# Patient Record
Sex: MALE | Race: WHITE | NOT HISPANIC OR LATINO | Employment: OTHER | URBAN - METROPOLITAN AREA
[De-identification: names, ages, dates, MRNs, and addresses within clinical notes are randomized per-mention and may not be internally consistent; named-entity substitution may affect disease eponyms.]

---

## 2023-12-28 ENCOUNTER — OFFICE VISIT (OUTPATIENT)
Dept: PULMONOLOGY | Facility: CLINIC | Age: 63
End: 2023-12-28
Payer: COMMERCIAL

## 2023-12-28 VITALS
WEIGHT: 189.8 LBS | HEIGHT: 72 IN | HEART RATE: 69 BPM | TEMPERATURE: 97.7 F | BODY MASS INDEX: 25.71 KG/M2 | DIASTOLIC BLOOD PRESSURE: 91 MMHG | SYSTOLIC BLOOD PRESSURE: 143 MMHG

## 2023-12-28 DIAGNOSIS — J44.9 CHRONIC OBSTRUCTIVE PULMONARY DISEASE, UNSPECIFIED COPD TYPE (MULTI): Primary | ICD-10-CM

## 2023-12-28 DIAGNOSIS — Z87.891 HISTORY OF NICOTINE DEPENDENCE: ICD-10-CM

## 2023-12-28 PROCEDURE — 1036F TOBACCO NON-USER: CPT | Performed by: STUDENT IN AN ORGANIZED HEALTH CARE EDUCATION/TRAINING PROGRAM

## 2023-12-28 PROCEDURE — 99203 OFFICE O/P NEW LOW 30 MIN: CPT | Performed by: STUDENT IN AN ORGANIZED HEALTH CARE EDUCATION/TRAINING PROGRAM

## 2023-12-28 RX ORDER — ATORVASTATIN CALCIUM 40 MG/1
40 TABLET, FILM COATED ORAL
COMMUNITY
Start: 2023-09-16

## 2023-12-28 RX ORDER — MELOXICAM 15 MG/1
15 TABLET ORAL
COMMUNITY
Start: 2023-10-09 | End: 2024-04-18 | Stop reason: HOSPADM

## 2023-12-28 RX ORDER — BUPRENORPHINE AND NALOXONE 8; 2 MG/1; MG/1
FILM, SOLUBLE BUCCAL; SUBLINGUAL
COMMUNITY
End: 2024-03-20 | Stop reason: WASHOUT

## 2023-12-28 RX ORDER — FLUOXETINE HYDROCHLORIDE 40 MG/1
CAPSULE ORAL
COMMUNITY
End: 2024-03-19 | Stop reason: SDUPTHER

## 2023-12-28 RX ORDER — PANTOPRAZOLE SODIUM 40 MG/1
40 TABLET, DELAYED RELEASE ORAL
COMMUNITY

## 2023-12-28 RX ORDER — NYSTATIN 100000 [USP'U]/ML
SUSPENSION ORAL
COMMUNITY
Start: 2023-09-13 | End: 2024-03-19 | Stop reason: WASHOUT

## 2023-12-28 RX ORDER — BENZOYL PEROXIDE 100 MG/ML
LIQUID TOPICAL
COMMUNITY
Start: 2020-07-15 | End: 2024-03-19 | Stop reason: WASHOUT

## 2023-12-28 RX ORDER — METOPROLOL SUCCINATE 25 MG/1
25 TABLET, EXTENDED RELEASE ORAL
COMMUNITY
Start: 2023-09-25

## 2023-12-28 RX ORDER — NAPROXEN SODIUM 550 MG/1
550 TABLET ORAL
COMMUNITY
End: 2024-04-18 | Stop reason: HOSPADM

## 2023-12-28 RX ORDER — HYDROXYZINE PAMOATE 25 MG/1
CAPSULE ORAL
COMMUNITY
Start: 2018-07-23 | End: 2024-03-19 | Stop reason: SDUPTHER

## 2023-12-28 RX ORDER — TIOTROPIUM BROMIDE 18 UG/1
18 CAPSULE ORAL; RESPIRATORY (INHALATION)
COMMUNITY
Start: 2019-09-30

## 2023-12-28 RX ORDER — TRIAMCINOLONE ACETONIDE 1 MG/G
CREAM TOPICAL
COMMUNITY
Start: 2023-02-16

## 2023-12-28 RX ORDER — CLINDAMYCIN PHOSPHATE 11.9 MG/ML
SOLUTION TOPICAL 2 TIMES DAILY
COMMUNITY
Start: 2023-06-09

## 2023-12-28 RX ORDER — OLANZAPINE 7.5 MG/1
TABLET ORAL
COMMUNITY

## 2023-12-28 RX ORDER — LIDOCAINE 50 MG/G
1 PATCH TOPICAL
COMMUNITY
Start: 2022-03-02

## 2023-12-28 RX ORDER — HYDROXYZINE HYDROCHLORIDE 50 MG/1
TABLET, FILM COATED ORAL
COMMUNITY
Start: 2020-11-20 | End: 2024-03-19 | Stop reason: SDUPTHER

## 2023-12-28 RX ORDER — DICLOFENAC SODIUM 10 MG/G
1 GEL TOPICAL 2 TIMES DAILY PRN
COMMUNITY
Start: 2015-06-05

## 2023-12-28 RX ORDER — OLANZAPINE 15 MG/1
15 TABLET ORAL
COMMUNITY
Start: 2019-09-30 | End: 2024-03-20 | Stop reason: WASHOUT

## 2023-12-28 RX ORDER — DILTIAZEM HYDROCHLORIDE 240 MG/1
240 CAPSULE, COATED, EXTENDED RELEASE ORAL
COMMUNITY
Start: 2023-12-06 | End: 2024-04-02

## 2023-12-28 RX ORDER — FLUTICASONE PROPIONATE 50 MCG
1 SPRAY, SUSPENSION (ML) NASAL
COMMUNITY
Start: 2022-11-15

## 2023-12-28 RX ORDER — IBUPROFEN 200 MG
TABLET ORAL EVERY 24 HOURS
COMMUNITY
Start: 2018-04-02 | End: 2024-01-15 | Stop reason: WASHOUT

## 2023-12-28 RX ORDER — ASPIRIN 81 MG/1
81 TABLET ORAL
COMMUNITY
Start: 2023-09-08

## 2023-12-28 RX ORDER — CHOLECALCIFEROL (VITAMIN D3) 25 MCG
25 TABLET ORAL
COMMUNITY
Start: 2023-10-09

## 2023-12-28 RX ORDER — FLUOXETINE HYDROCHLORIDE 20 MG/1
CAPSULE ORAL
COMMUNITY
Start: 2018-04-30 | End: 2024-03-19 | Stop reason: SDUPTHER

## 2023-12-28 RX ORDER — IPRATROPIUM BROMIDE AND ALBUTEROL SULFATE 2.5; .5 MG/3ML; MG/3ML
3 SOLUTION RESPIRATORY (INHALATION) EVERY 6 HOURS PRN
COMMUNITY
Start: 2023-08-02

## 2023-12-28 RX ORDER — BACLOFEN 10 MG/1
10 TABLET ORAL DAILY PRN
COMMUNITY
Start: 2018-02-02 | End: 2024-04-18 | Stop reason: HOSPADM

## 2023-12-28 RX ORDER — TRAZODONE HYDROCHLORIDE 100 MG/1
50 TABLET ORAL
COMMUNITY

## 2023-12-28 RX ORDER — LITHIUM CARBONATE 300 MG/1
300 CAPSULE ORAL 2 TIMES DAILY
COMMUNITY
Start: 2021-12-08

## 2023-12-28 RX ORDER — FLUOXETINE HYDROCHLORIDE 60 MG/1
1 TABLET, FILM COATED ORAL; ORAL
COMMUNITY
Start: 2018-01-11

## 2023-12-28 RX ORDER — TIOTROPIUM BROMIDE AND OLODATEROL 3.124; 2.736 UG/1; UG/1
2 SPRAY, METERED RESPIRATORY (INHALATION) DAILY
Qty: 1 G | Refills: 3 | Status: SHIPPED | OUTPATIENT
Start: 2023-12-28

## 2023-12-28 RX ORDER — TIOTROPIUM BROMIDE 18 UG/1
1 CAPSULE ORAL; RESPIRATORY (INHALATION) DAILY
COMMUNITY
Start: 2018-02-02 | End: 2024-03-19 | Stop reason: SDUPTHER

## 2023-12-28 RX ORDER — LITHIUM CARBONATE 150 MG/1
CAPSULE ORAL
COMMUNITY
Start: 2023-12-18 | End: 2024-03-20 | Stop reason: WASHOUT

## 2023-12-28 RX ORDER — CLINDAMYCIN PHOSPHATE 10 UG/ML
LOTION TOPICAL
COMMUNITY
Start: 2020-07-15 | End: 2024-03-19 | Stop reason: SDUPTHER

## 2023-12-28 RX ORDER — HYDROXYZINE HYDROCHLORIDE 25 MG/1
25 TABLET, FILM COATED ORAL 2 TIMES DAILY PRN
COMMUNITY
Start: 2023-06-23

## 2023-12-28 RX ORDER — CHOLECALCIFEROL (VITAMIN D3) 25 MCG
1 TABLET ORAL DAILY
COMMUNITY
Start: 2018-02-02 | End: 2024-03-19 | Stop reason: SDUPTHER

## 2023-12-28 RX ORDER — ASPIRIN/CALCIUM CARB/MAGNESIUM 325 MG
4 TABLET ORAL
COMMUNITY
Start: 2018-04-02 | End: 2024-01-15 | Stop reason: WASHOUT

## 2023-12-28 RX ORDER — FLUTICASONE PROPIONATE 50 MCG
1 SPRAY, SUSPENSION (ML) NASAL DAILY
COMMUNITY
Start: 2018-02-02 | End: 2024-03-19 | Stop reason: SDUPTHER

## 2023-12-28 NOTE — PATIENT INSTRUCTIONS
Thank you for visiting the Pulmonary Clinic today.   Your breathing medications: stop the spiriva--start the stiolto   Tests: will request the results of your pulmonary function test, Low dose CT chest in September 2024   Keep up the good work on quitting smoking, will also place referral to smoking cessation clinic  For resources to help quit smoking you can visit the [facility] website at [email or web address]  or call 0-250 QUIT-NOW ([phone])   Return in 6 months   If you have questions or concerns, call [phone] (option 4)

## 2023-12-28 NOTE — PROGRESS NOTES
Department of Medicine I Division of Pulmonary, Critical Care, and Sleep Medicine   [id number] [place] [address]  Phone: [phone]  Fax: [phone]    History of Present Illness   Trip Brown is a 63 y.o. male presenting with COPD/establish care with pulmonary     Referred by:  self-referral for COPD. I have independently interviewed and examined the patient in the office and reviewed available records.    Pt endorses several year history of COPD   Previously following with  pulmonary at [facility]--Dr. Rangel   Last pulmonary visit was in September in lung nodule clinic   Endorses having PFTs in the last 2 years with F   Has been having issues with thrush --has not been taking symbicort, inquiring about alternate inhalers.      MMRC 2  Exacerbations:  3 in the last year requiring steroids   No hospitalizations for the copd  No hospitalizations for pneumonias     Pulmonary Medications:  symbicort, spiriva --has been on these for 8 years  , albuterol prn maybe a couple times a day few days a week, nebulizer machine     Review of Systems  Review of Systems   Constitutional:  Negative for fatigue and fever.   HENT:  Negative for congestion.    Respiratory:  Positive for shortness of breath and wheezing. Negative for chest tightness.    Cardiovascular:  Negative for chest pain and leg swelling.   Musculoskeletal:  Positive for back pain.   Skin:  Negative for rash.   Neurological:  Negative for dizziness.     All other review of systems are negative and/or non-contributory.    Past Medical History   He has no past medical history on file.  Hepatitis C--s/p treatment   Back problems   Bipolar/ MDD   H/o heroin use   ?svt s/p ablation     Immunizations     Immunization History   Administered Date(s) Administered    Moderna SARS-CoV-2 Vaccination 01/21/2021, 02/18/2021    Pfizer Purple Cap SARS-CoV-2 09/20/2021       Medications and Allergies   No current outpatient medications    Patient has no known allergies.    Social History   He reports that he has never smoked. He has never used smokeless tobacco. No history on file for alcohol use and drug use.    Smoking History: quit smoking Dec 10, 2023 is using the nicotine lozenges.  Had smoked for 50 years.  Did quit before for a year, did try chantix before--but made him suicidal so it was stopped. Wellbutrin made him hallucinate.   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 Used to have pets.      Family History   No family history on file.  Does not know-adopted         Surgical History   He has no past surgical history on file.    Physical Exam   BP (!) 143/91 (BP Location: Right arm, Patient Position: Sitting, BP Cuff Size: Large adult)   Pulse 69   Temp 36.5 °C (97.7 °F) (Temporal)   Ht 1.829 m (6')   Wt 86.1 kg (189 lb 12.8 oz)   BMI 25.74 kg/m²      Physical Exam  Constitutional:       Appearance: Normal appearance.   HENT:      Head: Normocephalic and atraumatic.      Mouth/Throat:      Comments: No thrush  Eyes:      Pupils: Pupils are equal, round, and reactive to light.   Cardiovascular:      Rate and Rhythm: Normal rate and regular rhythm.   Pulmonary:      Effort: Pulmonary effort is normal.      Breath sounds: Normal breath sounds.   Skin:     Findings: No rash.   Neurological:      General: No focal deficit present.      Mental Status: He is alert and oriented to person, place, and time.   Psychiatric:         Mood and Affect: Mood normal.          Results   Pulmonary Function Tests:      No pfts on file     Chest Radiograph:  No results found for this or any previous visit from the past 2000 days.      Chest CT Scan:  9/2023   Other findings:  The central airways are patent without evidence of   endobronchial lesion.  Mild diffuse bronchial wall thickening is seen.    There is no acute focal lung consolidation.  Subsegmental atelectasis is   seen in the lingula.  No pleural effusion or  "pneumothorax is seen.  No   enlarged supraclavicular, axillary, mediastinal or hilar lymph nodes are   seen.  The aorta and main pulmonary artery are normal in course and   caliber.  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  The heart   size is normal.  There is no pericardial effusion.  The thyroid gland is   heterogeneous.  The esophagus is nondilated.  The soft tissues of the   chest wall are unremarkable.  Multiple cysts are seen in the partially   imaged kidneys, better characterized on CT abdomen dated 10/23/2018.    Sclerosis of the L2 vertebra is partially imaged, likely degenerative,   unchanged since 10/23/2018.     ECHO:  No results found for this or any previous visit from the past 365 days.       Labs:  No results found for: \"WBC\", \"HGB\", \"HCT\", \"MCV\", \"PLT\"    No results found for: \"CREATININE\", \"BUN\", \"NA\", \"K\", \"CL\", \"CO2\"     No results found for: \"YASMANI\", \"RF\", \"SEDRATE\"     IgE: No results found for: \"IGE\"   Respiratory Allergy Panel:  AEC: No results found for: \"EOSABS\", \"EOSPCT\"    Cultures:  No results found for: \"AFBCX\"   No results found for: \"RESPCULTCYFI\"    No results found for the last 90 days.  C     Assessment and Plan       63 year old male here to establish care with pulmonary for his COPD     COPD  -will ask office to request results of most recent PFTs (was not able to find them in care everywhere)   -will stop symbicort and spiriva and start stiolto   -continue prn albuterol       Smoking cessation   -keep up the good work staying abstinent from smoking   -smoking cessation clinic referral placed   -quit line resources provided     Lung cancer screening  -next CT scan due 9/2024         Follow-up:  Visit date not found     Sarahi Viera MD  12/28/2023    "

## 2024-01-02 ASSESSMENT — ENCOUNTER SYMPTOMS
FEVER: 0
FATIGUE: 0
BACK PAIN: 1
WHEEZING: 1
CHEST TIGHTNESS: 0
DIZZINESS: 0
SHORTNESS OF BREATH: 1

## 2024-01-15 ENCOUNTER — CLINICAL SUPPORT (OUTPATIENT)
Dept: CARDIAC REHAB | Facility: HOSPITAL | Age: 64
End: 2024-01-15
Payer: COMMERCIAL

## 2024-01-15 DIAGNOSIS — Z87.891 HISTORY OF NICOTINE DEPENDENCE: ICD-10-CM

## 2024-01-15 PROCEDURE — 99407 BEHAV CHNG SMOKING > 10 MIN: CPT | Performed by: INTERNAL MEDICINE

## 2024-01-16 RX ORDER — ASPIRIN/CALCIUM CARB/MAGNESIUM 325 MG
4 TABLET ORAL EVERY 2 HOUR PRN
Qty: 100 LOZENGE | Refills: 0 | Status: SHIPPED | OUTPATIENT
Start: 2024-01-16 | End: 2024-02-15

## 2024-01-16 RX ORDER — IBUPROFEN 200 MG
1 TABLET ORAL EVERY 24 HOURS
Qty: 30 PATCH | Refills: 0 | Status: SHIPPED | OUTPATIENT
Start: 2024-01-16 | End: 2024-02-15

## 2024-01-16 NOTE — PROGRESS NOTES
Tobacco Treatment Counseling- Initial Visit    Name: Trip Brown            MRN: [id number]          YOB: 1960           Age: 63 y.o.                  Today’s Date: 1/16/2024  Primary Care Physician: Sergei Soto MD  Referring Physician: Sarahi Viera MD  Program Location: [address]         Start time: 10:00 AM   End time: 10:50 AM      Trip Brown presents for initial session for Tobacco Treatment Counseling. Patient currently smoking 1  PPD  and has been smoking  since the age of 12 . Patient has a high dependence on nicotine according to the [facility] nicotine dependence assessment. At this time, patient is motivated to quit smoking due to health concerns (COPD, asthma, PARRA, etc.). Patient says that he likes to hike and hasn't been able to anymore due to respiratory issues. He also plans to move to [place] this spring and wants to be able to spend time outdoors and go hiking. See below for detailed assessment of tobacco use and treatment plan.    CO level (@ time of appt.): breath testing unavailable at this time    Smoking triggers/routines:  drinking coffee, boredom, says smoking is worse first thing in the AM and then again before bed    Past quit attempts:  quit for 9 months, relapsed about 4 months ago >> used the nicotine patches which were helpful >> does not remember specific reason for relapse    Potential barriers to quitting:  smokes inside his home    Strengths:  has quit for a long period in the past >> also has a hobby he's passionate about that keeps him busy and doesn't trigger him to smoke    Medication plan:  has used NRT to quit in the past (patches and lozenges, step down process) >> [place] quit line is sending him 21 mg patches and lozenges >> Rx also placed to see if insurance will cover    Coping strategies:  distraction techniques >> patient's hobby is restoring old motorcycles, this keeps him busy during the day and he does not smoke  while he does this    Next steps:  waiting for NRT from quit line, plans to start it ASAP and attempt cessation >> patient to call clinic once he receives NRT for F/U >> patient also interested in starting [place] [facility] being held at [facility] starting 1/23/24     Will follow-up to evaluate progress and make any necessary changes to quit plan. Patient to call office at [phone] with any questions/concerns.      Tobacco Treatment Staff Signature Jeniffer Cornejo RN

## 2024-01-23 ENCOUNTER — OFFICE VISIT (OUTPATIENT)
Dept: ORTHOPEDIC SURGERY | Facility: CLINIC | Age: 64
End: 2024-01-23
Payer: COMMERCIAL

## 2024-01-23 ENCOUNTER — APPOINTMENT (OUTPATIENT)
Dept: ORTHOPEDIC SURGERY | Facility: CLINIC | Age: 64
End: 2024-01-23
Payer: COMMERCIAL

## 2024-01-23 DIAGNOSIS — M54.16 LUMBAR RADICULOPATHY: ICD-10-CM

## 2024-01-23 PROCEDURE — 1036F TOBACCO NON-USER: CPT | Performed by: ORTHOPAEDIC SURGERY

## 2024-01-23 PROCEDURE — 99203 OFFICE O/P NEW LOW 30 MIN: CPT | Performed by: ORTHOPAEDIC SURGERY

## 2024-01-23 PROCEDURE — 99213 OFFICE O/P EST LOW 20 MIN: CPT | Performed by: ORTHOPAEDIC SURGERY

## 2024-01-23 ASSESSMENT — PAIN SCALES - GENERAL: PAINLEVEL_OUTOF10: 10 - WORST POSSIBLE PAIN

## 2024-01-23 ASSESSMENT — PAIN - FUNCTIONAL ASSESSMENT: PAIN_FUNCTIONAL_ASSESSMENT: 0-10

## 2024-01-23 NOTE — PROGRESS NOTES
HPI:Trip Brown is a 63-year-old man, who comes in today with complaints of years of back pain and right leg pain.  It goes down the leg and L5-S1 distribution.  He has chronic numbness and tingling in the leg.  Historically has had numerous steroid injections which are giving diminishing return.  He has done aqua therapy at the [facility] approximately 7 months ago.  Despite this recent nonoperative management his radicular symptoms persist.  He has not had an MRI.      ROS:  Reviewed on EMR and patient intake sheet.    PMH/SH:  Reviewed on EMR and patient intake sheet.    Exam:  Physical Exam    Constitutional: Well appearing; no acute distress  Eyes: pupils are equal and round  Psych: normal affect  Respiratory: non-labored breathing  Cardiovascular: regular rate and rhythm  GI: non-distended abdomen  Musculoskeletal: no pain with range of motion of the hips bilaterally  Neurologic: [4]/5 strength in the lower extremities bilaterally]; [positive right] straight leg raise; no clonus; negative babinski    Radiology:     No imaging performed today    Diagnosis:    Lumbar radiculopathy    Assessment and Plan:   63-year-old male with intractable lumbar radiculopathy which has not improved with recent physical therapy.  At this time he will need an MRI follow-up to discuss any further treatment options.    The patient was in agreement with the plan. At the end of the visit today, the patient felt that all questions had been answered satisfactorily.  The patient was pleased with the visit and very appreciative for the care rendered.     Thank you very much for the kind referral.  It is a privilege, and a pleasure, to partner with you in the care of your patients.  I would be delighted to assist you with any further consultations as needed.          Marvin Herr MD    Chief of Spine Surgery, [facility]  Director of Spine Service, [facility]  [place]  , Department of Orthopaedics  [facility]  [address]  [place] [id number]  P: [phone]    This note was dictated with voice recognition software.  It has not been proofread for grammatical errors, typographical mistakes or other semantic inconsistencies.

## 2024-01-29 PROBLEM — M54.16 LUMBAR RADICULOPATHY: Status: ACTIVE | Noted: 2024-01-29

## 2024-01-29 PROBLEM — R21 RASH AND NONSPECIFIC SKIN ERUPTION: Status: ACTIVE | Noted: 2022-11-17

## 2024-01-29 PROBLEM — F11.93 OPIOID WITHDRAWAL (MULTI): Chronic | Status: ACTIVE | Noted: 2017-09-17

## 2024-01-29 PROBLEM — F33.2 SEVERE EPISODE OF RECURRENT MAJOR DEPRESSIVE DISORDER, WITHOUT PSYCHOTIC FEATURES (MULTI): Chronic | Status: ACTIVE | Noted: 2017-09-17

## 2024-01-29 PROBLEM — R26.89 OTHER ABNORMALITIES OF GAIT AND MOBILITY: Status: ACTIVE | Noted: 2022-07-11

## 2024-01-29 PROBLEM — D18.01 HEMANGIOMA OF SKIN AND SUBCUTANEOUS TISSUE: Status: ACTIVE | Noted: 2020-07-15

## 2024-01-29 PROBLEM — M17.32 POST-TRAUMATIC OSTEOARTHRITIS OF LEFT KNEE: Status: ACTIVE | Noted: 2017-10-10

## 2024-01-29 PROBLEM — J45.909 ASTHMA (HHS-HCC): Status: ACTIVE | Noted: 2020-06-29

## 2024-01-29 PROBLEM — M43.10 ACQUIRED SPONDYLOLISTHESIS: Status: ACTIVE | Noted: 2024-01-29

## 2024-01-29 PROBLEM — S46.212A RUPTURE OF LEFT PROXIMAL BICEPS TENDON: Status: ACTIVE | Noted: 2022-12-19

## 2024-01-29 PROBLEM — M54.16 CHRONIC LUMBAR RADICULOPATHY: Status: ACTIVE | Noted: 2024-01-29

## 2024-01-29 PROBLEM — F14.20 COCAINE USE DISORDER, SEVERE, DEPENDENCE (MULTI): Chronic | Status: ACTIVE | Noted: 2020-06-29

## 2024-01-29 PROBLEM — F11.20 OPIOID USE DISORDER, SEVERE, DEPENDENCE (MULTI): Chronic | Status: ACTIVE | Noted: 2020-06-29

## 2024-01-29 PROBLEM — B18.1 HEPATITIS B CARRIER (MULTI): Chronic | Status: ACTIVE | Noted: 2020-06-29

## 2024-01-29 PROBLEM — F14.11 COCAINE ABUSE IN REMISSION (MULTI): Chronic | Status: ACTIVE | Noted: 2018-08-20

## 2024-01-29 PROBLEM — F19.10 POLYSUBSTANCE ABUSE (MULTI): Chronic | Status: ACTIVE | Noted: 2018-02-02

## 2024-01-29 PROBLEM — M77.8 TENDINITIS OF LEFT SHOULDER: Status: ACTIVE | Noted: 2017-10-10

## 2024-01-29 PROBLEM — F32.A MILD DEPRESSIVE DISORDER: Chronic | Status: ACTIVE | Noted: 2020-06-29

## 2024-01-29 PROBLEM — J44.1 ASTHMA WITH COPD WITH EXACERBATION (MULTI): Status: ACTIVE | Noted: 2022-11-17

## 2024-01-29 PROBLEM — M50.90 CERVICAL DISC DISEASE: Status: ACTIVE | Noted: 2017-10-10

## 2024-01-29 PROBLEM — J32.0 CHRONIC MAXILLARY SINUSITIS: Status: ACTIVE | Noted: 2020-06-29

## 2024-01-29 PROBLEM — F41.9 ANXIETY: Status: ACTIVE | Noted: 2018-02-02

## 2024-01-29 PROBLEM — F14.90 COCAINE USE: Status: ACTIVE | Noted: 2017-09-17

## 2024-01-29 PROBLEM — L82.0 INFLAMED SEBORRHEIC KERATOSIS: Status: ACTIVE | Noted: 2020-07-15

## 2024-01-29 PROBLEM — I47.10 SVT (SUPRAVENTRICULAR TACHYCARDIA) (CMS-HCC): Status: ACTIVE | Noted: 2022-11-17

## 2024-01-29 PROBLEM — J44.9 CHRONIC OBSTRUCTIVE PULMONARY DISEASE (MULTI): Chronic | Status: ACTIVE | Noted: 2018-02-02

## 2024-01-29 PROBLEM — R07.9 CHEST PAIN: Status: ACTIVE | Noted: 2018-09-11

## 2024-01-29 PROBLEM — M79.672 FOOT PAIN, BILATERAL: Status: ACTIVE | Noted: 2017-10-10

## 2024-01-29 PROBLEM — M54.30 SCIATIC PAIN: Status: ACTIVE | Noted: 2022-11-17

## 2024-01-29 PROBLEM — M41.9 ACQUIRED SCOLIOSIS: Status: ACTIVE | Noted: 2024-01-29

## 2024-01-29 PROBLEM — M54.9 CHRONIC BACK PAIN: Status: ACTIVE | Noted: 2018-04-02

## 2024-01-29 PROBLEM — J44.89 COPD WITH CHRONIC BRONCHITIS (MULTI): Chronic | Status: ACTIVE | Noted: 2017-05-25

## 2024-01-29 PROBLEM — F17.210 DEPENDENCE ON NICOTINE FROM CIGARETTES: Status: ACTIVE | Noted: 2022-11-17

## 2024-01-29 PROBLEM — N28.9 RENAL INSUFFICIENCY: Status: ACTIVE | Noted: 2023-04-19

## 2024-01-29 PROBLEM — J32.9 SINUSITIS: Status: ACTIVE | Noted: 2022-11-17

## 2024-01-29 PROBLEM — M17.0 PRIMARY OSTEOARTHRITIS OF BOTH KNEES: Status: ACTIVE | Noted: 2022-07-11

## 2024-01-29 PROBLEM — F19.94 SUBSTANCE INDUCED MOOD DISORDER (MULTI): Chronic | Status: ACTIVE | Noted: 2017-09-17

## 2024-01-29 PROBLEM — M79.671 FOOT PAIN, BILATERAL: Status: ACTIVE | Noted: 2017-10-10

## 2024-01-29 PROBLEM — F19.21 HISTORY OF SUBSTANCE DEPENDENCE (MULTI): Status: ACTIVE | Noted: 2024-01-29

## 2024-01-29 PROBLEM — G25.81 RESTLESS LEGS SYNDROME: Status: ACTIVE | Noted: 2017-10-10

## 2024-01-29 PROBLEM — J41.8 MIXED SIMPLE AND MUCOPURULENT CHRONIC BRONCHITIS (MULTI): Status: ACTIVE | Noted: 2017-10-19

## 2024-01-29 PROBLEM — G89.29 CHRONIC BACK PAIN: Status: ACTIVE | Noted: 2018-04-02

## 2024-01-29 PROBLEM — J45.901 ASTHMA WITH COPD WITH EXACERBATION (MULTI): Status: ACTIVE | Noted: 2022-11-17

## 2024-01-29 PROBLEM — D69.6 THROMBOCYTOPENIA (CMS-HCC): Status: ACTIVE | Noted: 2022-11-17

## 2024-01-29 PROBLEM — R04.0 EPISTAXIS: Status: ACTIVE | Noted: 2022-05-04

## 2024-01-29 RX ORDER — AMANTADINE HYDROCHLORIDE 100 MG/1
100 CAPSULE, GELATIN COATED ORAL 2 TIMES DAILY
COMMUNITY
Start: 2024-01-17

## 2024-01-29 RX ORDER — ALBUTEROL SULFATE 90 UG/1
2 AEROSOL, METERED RESPIRATORY (INHALATION) EVERY 6 HOURS PRN
COMMUNITY
Start: 2018-02-02

## 2024-01-31 ENCOUNTER — HOSPITAL ENCOUNTER (OUTPATIENT)
Dept: CARDIOLOGY | Facility: HOSPITAL | Age: 64
Discharge: HOME | End: 2024-01-31
Payer: COMMERCIAL

## 2024-01-31 ENCOUNTER — OFFICE VISIT (OUTPATIENT)
Dept: CARDIOLOGY | Facility: HOSPITAL | Age: 64
End: 2024-01-31
Payer: COMMERCIAL

## 2024-01-31 VITALS
WEIGHT: 194 LBS | HEART RATE: 45 BPM | DIASTOLIC BLOOD PRESSURE: 66 MMHG | OXYGEN SATURATION: 96 % | SYSTOLIC BLOOD PRESSURE: 119 MMHG | BODY MASS INDEX: 26.28 KG/M2 | HEIGHT: 72 IN

## 2024-01-31 DIAGNOSIS — I47.10 SVT (SUPRAVENTRICULAR TACHYCARDIA) (CMS-HCC): ICD-10-CM

## 2024-01-31 DIAGNOSIS — R00.1 BRADYCARDIA: ICD-10-CM

## 2024-01-31 DIAGNOSIS — R00.2 PALPITATIONS: ICD-10-CM

## 2024-01-31 DIAGNOSIS — R07.9 CHEST PAIN, UNSPECIFIED TYPE: ICD-10-CM

## 2024-01-31 DIAGNOSIS — R00.2 PALPITATIONS: Primary | ICD-10-CM

## 2024-01-31 PROCEDURE — 99214 OFFICE O/P EST MOD 30 MIN: CPT | Performed by: STUDENT IN AN ORGANIZED HEALTH CARE EDUCATION/TRAINING PROGRAM

## 2024-01-31 PROCEDURE — 1036F TOBACCO NON-USER: CPT | Performed by: STUDENT IN AN ORGANIZED HEALTH CARE EDUCATION/TRAINING PROGRAM

## 2024-01-31 PROCEDURE — 99204 OFFICE O/P NEW MOD 45 MIN: CPT | Performed by: STUDENT IN AN ORGANIZED HEALTH CARE EDUCATION/TRAINING PROGRAM

## 2024-01-31 PROCEDURE — 93242 EXT ECG>48HR<7D RECORDING: CPT

## 2024-01-31 PROCEDURE — 93244 EXT ECG>48HR<7D REV&INTERPJ: CPT | Performed by: INTERNAL MEDICINE

## 2024-01-31 PROCEDURE — 93005 ELECTROCARDIOGRAM TRACING: CPT | Mod: 59 | Performed by: STUDENT IN AN ORGANIZED HEALTH CARE EDUCATION/TRAINING PROGRAM

## 2024-01-31 RX ORDER — REGADENOSON 0.08 MG/ML
0.4 INJECTION, SOLUTION INTRAVENOUS
Status: CANCELLED | OUTPATIENT
Start: 2024-01-31

## 2024-01-31 ASSESSMENT — PAIN SCALES - GENERAL: PAINLEVEL: 0-NO PAIN

## 2024-01-31 NOTE — PROGRESS NOTES
Location of visit: [facility]   Type of Visit: New    Chief Complaint:  Patient was self-referred to Cardiology palpitations.    History Of Present Illness:    Trip Brown is a 63 y.o. male, with history significant for supraventricular tachycardia status post ablation 4/2023 [facility], frequent PVCs, HTN, COPD/asthma, possible TIA, hepatitis C status post treatment, BPAD/MDD, active smoking, prior cocaine/heroin use, who visits Cardiology today as a new patient  for palpitations.  He continued to have palpitations after his ablation. He wore a Zio monitor 6/2023 which showed 16% PVC burden and a Zio monitor 8/2023 which showed 17.5% PVC burden.     Echo from [facility] 9/5/2023 showed EF=60%, no significant valvular stenosis or regurgitation. At that time he was started on metoprolol succinate 25 mg daily but did not tolerate it. He was switched to Cardizem  mg but he refers that was also discontinued as it didn't help with symptoms and increased his shortness of breath.     He reports to continue to have palpitations on a daily basis, which feel the same as prior to his ablation. The symptoms can last for at least a couple of minutes. He feels fatigued and anxious with episodes. Of note, his HR today is 45 bpm without a beta blocker of a CCB.    He refers dyspnea on exertion associated to exercise related chest pain. He denies orthopnea, PND, nocturia, edema, dizziness, lightheadedness, syncope, or claudication.    Today's ECG shows sinus bradycardia at 44 bpm, normal AV conduction, QS V1-V2, and normal ventricular repolarization.    Past Medical History:  supraventricular tachycardia status post ablation 4/2023 [facility], frequent PVCs, HTN, COPD/asthma, possible TIA, hepatitis C status post treatment, BPAD/MDD, active smoking, prior cocaine/heroin use.    Past Surgical History:  SVT ablation.    Social History:  He reports active smoking 0.5 pack a day. He has never used smokeless tobacco.     Family  History:  No family history on file.    Allergies:  Bupropion    Outpatient Medications:  Current Outpatient Medications   Medication Instructions    amantadine (Symmetrel) 100 mg capsule     aspirin 81 mg, oral, Daily RT    atorvastatin (LIPITOR) 40 mg, oral, Daily RT    baclofen (LIORESAL) 10 mg, oral, Daily PRN    benzoyl peroxide (Benzac AC) 10 % external wash 1 Application    buprenorphine-naloxone (Suboxone) 8-2 mg SL film     cholecalciferol (Vitamin D-3) 25 MCG (1000 UT) tablet 1 tablet, oral, Daily    cholecalciferol (VITAMIN D-3) 25 mcg, oral, Daily RT    clindamycin (Cleocin T) 1 % external solution Topical, 2 times daily    clindamycin (Cleocin T) 1 % lotion Apply to the affected areas on chest and back daily after showering    diclofenac sodium (Voltaren) 1 % gel gel     dilTIAZem CD (CARDIZEM CD) 240 mg, oral, Daily RT    FLUoxetine (PROzac) 20 mg capsule     FLUoxetine (PROzac) 40 mg capsule Take one (40 mg) capsule by mouth daily along with one (20 mg) capsule for a total of 60 mg.    FLUoxetine (PROzac) 60 mg tablet 1 capsule, oral, Daily RT    fluticasone (Flonase) 50 mcg/actuation nasal spray 1 spray, nasal, Daily    fluticasone (Flonase) 50 mcg/actuation nasal spray 1 spray, nasal, Daily RT    hydrOXYzine HCL (Atarax) 50 mg tablet as needed.    hydrOXYzine HCL (ATARAX) 25 mg, oral, 2 times daily PRN    hydrOXYzine pamoate (Vistaril) 25 mg capsule oral    ipratropium-albuteroL (Duo-Neb) 0.5-2.5 mg/3 mL nebulizer solution 3 mL, inhalation, Every 6 hours PRN    lidocaine (Lidoderm) 5 % patch 1 patch, transdermal    lithium 150 mg capsule Take one 150 mg capsule by mouth at night. Take with one 300 mg capsule for a total of 450 mg at night.    lithium 300 mg, oral, 2 times daily    meloxicam (MOBIC) 15 mg, oral, Daily RT    metoprolol succinate XL (TOPROL-XL) 25 mg, oral, Daily RT    naproxen sodium (Anaprox) 550 mg tablet     nicotine (Nicoderm CQ) 21 mg/24 hr patch 1 patch, transdermal, Every 24  hours    nicotine polacrilex (COMMIT) 4 mg, Mouth/Throat, Every 2 hour PRN    nystatin (Mycostatin) 100,000 unit/mL suspension     OLANZapine (ZyPREXA) 7.5 mg tablet Take 1 Tablet by mouth nightly at bedtime; Indications manic-depression    OLANZapine (ZYPREXA) 15 mg, oral, Daily RT    pantoprazole (ProtoNix) 40 mg EC tablet     tiotropium (Spiriva) 18 mcg inhalation capsule 1 capsule, inhalation, Daily    tiotropium (SPIRIVA) 18 mcg, inhalation, Daily RT    tiotropium-olodateroL (Stiolto Respimat) 2.5-2.5 mcg/actuation mist inhaler 2 Inhalations, inhalation, Daily    traZODone (DESYREL) 50 mg, oral    triamcinolone (Kenalog) 0.1 % cream Apply to affected area twice daily Monday-Friday. Take weekends off. Do not use on face, armpits, neck, or groin.    Ventolin HFA 90 mcg/actuation inhaler      Last Recorded Vitals:  Vitals:    01/31/24 1040   BP: 119/66   BP Location: Left arm   Patient Position: Sitting   BP Cuff Size: Adult   Pulse: (!) 45   SpO2: 96%   Weight: 88 kg (194 lb)   Height: 1.829 m (6')     Physical Exam:      1/31/2024    10:40 AM 12/28/2023     2:59 PM   Vitals   Systolic 119 143   Diastolic 66 91   Heart Rate 45 69   Temp  36.5 °C (97.7 °F)   Height (in) 1.829 m (6') 1.829 m (6')   Weight (lb) 194 189.8   BMI 26.31 kg/m2 25.74 kg/m2   BSA (m2) 2.11 m2 2.09 m2   Visit Report Report Report     Wt Readings from Last 5 Encounters:   01/31/24 88 kg (194 lb)   12/28/23 86.1 kg (189 lb 12.8 oz)     General: Sitting up comfortably in chair; in no apparent distress.  HEENT: Normocephalic; atraumatic. Well hydrated.  Eyes: Anicteric sclera. Extraocular movement intact.  Neck: Supple; no thyromegaly; normal jugular venous pressure, no bruits.  Respiratory: Bilateral air entry equal. No wheezing.  Cardiovascular: Normal S1, S2; no murmurs auscultated.  Abdomen: Nondistended; nontender. (+) bowel sounds.  Extremities: No peripheral edema present. Pulses 2+ diffusely.  Neurological: Oriented to time, place, and  person; nonfocal.  Psychiatric: Normal affect.     Last Labs Reviewed:  No labs available in the  system.    Last Cardiology/Imaging Tests Personally Reviewed (if images available) and Interpreted:  ECG:  Encounter Date: 01/31/24   ECG 12 Lead   Result Value    Ventricular Rate 44    Atrial Rate 44    KY Interval 182    QRS Duration 84    QT Interval 426    QTC Calculation(Bazett) 364    P Axis 64    R Axis 55    T Axis 92    QRS Count 7    Q Onset 225    P Onset 134    P Offset 181    T Offset 438    QTC Fredericia 384    Narrative    Marked sinus bradycardia  Septal infarct , age undetermined  Abnormal ECG  No previous ECGs available     Echocardiogram:  No results found.    Cath:  No results found.    Stress Test:  No results found.    Cardiac CT/MRI:  No results found.    Other CT:  No results found.    CV RISK FACTORS:   # Hypertension: Last BP: 119/66.  # Hyperlipidemia: Last Tchol No results found for requested labs within last 365 days. / LDL No results found for requested labs within last 365 days. / HDL No results found for requested labs within last 365 days. / TRIG No results found for requested labs within last 365 days. (No results in last year.).  # Type II Diabetes Mellitus: Last A1c No results found for requested labs within last 365 days. (No results in last year.).  # Obesity: Last BMI: 26.31.  # CKD: Last BUN/Cr (GFR): No results found for requested labs within last 365 days./No results found for requested labs within last 365 days. (No results found for requested labs within last 365 days.), No results in last year..    ASCV RISK:  The ASCVD Risk score (Loretta DK, et al., 2019) failed to calculate for the following reasons:    Cannot find a previous HDL lab    Cannot find a previous total cholesterol lab    Unable to determine if patient is Non-     Assessment/Plan   63 y.o. male, with history significant for supraventricular tachycardia status post ablation 4/2023 [facility],  frequent PVCs, HTN, COPD/asthma, possible TIA, hepatitis C status post treatment, BPAD/MDD, active smoking, prior cocaine/heroin use, who visits Cardiology today as a new patient persistent for palpitations. Has demonstrated high frequency of PVCs, unclear if monomorphic or polymorphic. Also, there is no description if episodes of tachycardia were associated or not to PVCs. His HR is low today, per patient not related to medications as he stopped metoprolol and diltiazem. He was explained that a new Holter is required and he agreed. He was explained the possible outcomes of the study, specially that we not find an association of an arrhythmia and his symptoms. During the interview he also referred chest pain with exercise which is apparently new.     Recommendations:  - SVT/Palpitations/bradycardia: 2 weeks Holter monitor  - Chest pain: nuclear stress test with Lexiscan  - Patient will follow up with me in the Cardiology office once the results are available  - I spent 55 minutes assessing the case between pre-charting, face-to-face patient interaction, and documentation    Anthony Membreno MD

## 2024-02-01 PROBLEM — R00.2 PALPITATIONS: Status: ACTIVE | Noted: 2024-02-01

## 2024-02-01 PROBLEM — R00.1 BRADYCARDIA: Status: ACTIVE | Noted: 2024-02-01

## 2024-02-10 ENCOUNTER — HOSPITAL ENCOUNTER (OUTPATIENT)
Dept: RADIOLOGY | Facility: HOSPITAL | Age: 64
Discharge: HOME | End: 2024-02-10
Payer: COMMERCIAL

## 2024-02-10 DIAGNOSIS — M54.16 LUMBAR RADICULOPATHY: ICD-10-CM

## 2024-02-10 PROCEDURE — 72148 MRI LUMBAR SPINE W/O DYE: CPT | Performed by: STUDENT IN AN ORGANIZED HEALTH CARE EDUCATION/TRAINING PROGRAM

## 2024-02-10 PROCEDURE — 72148 MRI LUMBAR SPINE W/O DYE: CPT

## 2024-02-13 ENCOUNTER — OFFICE VISIT (OUTPATIENT)
Dept: ORTHOPEDIC SURGERY | Facility: CLINIC | Age: 64
End: 2024-02-13
Payer: COMMERCIAL

## 2024-02-13 DIAGNOSIS — M54.16 LUMBAR RADICULOPATHY: ICD-10-CM

## 2024-02-13 DIAGNOSIS — M43.10 ISTHMIC SPONDYLOLISTHESIS: Primary | ICD-10-CM

## 2024-02-13 DIAGNOSIS — M48.061 FORAMINAL STENOSIS OF LUMBAR REGION: ICD-10-CM

## 2024-02-13 PROCEDURE — 99215 OFFICE O/P EST HI 40 MIN: CPT | Performed by: ORTHOPAEDIC SURGERY

## 2024-02-13 PROCEDURE — 1036F TOBACCO NON-USER: CPT | Performed by: ORTHOPAEDIC SURGERY

## 2024-02-13 ASSESSMENT — PAIN - FUNCTIONAL ASSESSMENT: PAIN_FUNCTIONAL_ASSESSMENT: NO/DENIES PAIN

## 2024-02-13 NOTE — LETTER
February 13, 2024     Sergei Soto MD    Patient: Trip Brown   YOB: 1960   Date of Visit: 2/13/2024       Dear Dr. Sergei Soto MD:    Thank you for referring Trip Brown to me for evaluation. Below are my notes for this consultation.  If you have questions, please do not hesitate to call me. I look forward to following your patient along with you.       Sincerely,     Marvin Herr MD      CC: No Recipients  ______________________________________________________________________________________    HPI:Trip Brown-year-old man, comes in today for follow-up.  He comes to review his MRI.  He continues to struggle with intractable right leg pain which has persisted despite physical therapy as well as steroid injections.  At this time, he would like to consider surgery.      ROS:  Reviewed on EMR and patient intake sheet.    PMH/SH:  Reviewed on EMR and patient intake sheet.    Exam:  Physical Exam    Constitutional: Well appearing; no acute distress  Eyes: pupils are equal and round  Psych: normal affect  Respiratory: non-labored breathing  Cardiovascular: regular rate and rhythm  GI: non-distended abdomen  Musculoskeletal: no pain with range of motion of the hips bilaterally  Neurologic: [4]/5 strength in the lower extremities bilaterally]; [mildly positive right] straight leg raise; no clonus; negative babinski    Radiology:     MRI demonstrates severe foraminal stenosis at L5-S1 secondary to a bilateral pars defect and resulting spondylolisthesis.  There is severe compression of the exiting L5 nerve root    Diagnosis:    Lumbar radiculopathy; L5-S1 isthmic spondylolisthesis; foraminal stenosis    Assessment and Plan:   63-year-old male with intractable lumbar radiculopathy secondary to an L5-S1 isthmic spondylolisthesis and resulting foraminal stenosis.  He has failed nonoperative management occluding physical therapy and steroid injections.  Surgical intervention  would be the next step.  He would need an L5-S1 anterior lumbar interbody fusion.  Relative CPT code includes: 53174 for the interbody fusion, 55839 and 82826 for the MTF femoral ring allograft spacer and demineralized bone matrix followed by 58415 for the posterior percutaneous pedicle screw instrumentation.    We discussed surgery today at length, including the specifics of the actual proposed procedure, the projected hospital course and post-operative recovery or rehabilitation, and the expected results of this surgery.  I delineated the potential benefits and risks of the proposed surgery, including, but not limited to those of infection, spinal fluid leaks, nerve injury or paralysis, whether that be complete or partial paralysis, thigh numbness and/or tingling, hip flexor weakness, foot drop, implant or instrumentation failure, non-union or latent instability, future adjacent segment disease, epidural hematoma, vascular injury, bowel perforation, retrograde ejaculation, wound dehiscence, reherniation, persistent pain or paresthesias, and the general risks of anaesthesia including, but not limited to those of stroke, heart attack, respiratory difficulties and death.  The patient understands that there are no guarantees in regard to outcome or potential complications associated with the proposed procedure.  After this discussion, the patient articulated understanding of the topics covered and felt that all questions had been covered and answered satisfactorily.        Prior to proceeding with surgery, the patient will need to be nicotine free.  He committed to smoking cessation today.  An extended discussion was given on the importance of smoking cessation in regards to the overall disease course in the spine.  The patient expresses understanding that smoking affects the degenerative process in the spine, accentuating degenerative disease and the symptoms there of.  The patient understands the vital role smoking  plays in the post operative healing course and that smoking places the patient at extremely high risk for non-union and wound healing problems.  The patient understands that smoking is a personal, patient specific, and disease modifiable choice and that any untoward consequences in regard to further, ongoing symptoms or problems with post-operative healing, in any way attributable to nicotine use, are the sole responsibility of the patient.  The patient expressed gratitude in regards to the education given today on this topic.    He will be tested for nicotine in 6 weeks time.  At that point, assuming the nicotine test is negative we will proceed with surgery.    The patient was in agreement with the plan. At the end of the visit today, the patient felt that all questions had been answered satisfactorily.  The patient was pleased with the visit and very appreciative for the care rendered.     Thank you very much for the kind referral.  It is a privilege, and a pleasure, to partner with you in the care of your patients.  I would be delighted to assist you with any further consultations as needed.          Marvin Herr MD    Chief of Spine Surgery, [facility]  Director of Spine Service, [facility]  , Department of Orthopaedics  [facility] [employer]  [address] [place] [address]  P: [phone]    This note was dictated with voice recognition software.  It has not been proofread for grammatical errors, typographical mistakes or other semantic inconsistencies.

## 2024-02-13 NOTE — PROGRESS NOTES
HPI:Trip Brown-year-old man, comes in today for follow-up.  He comes to review his MRI.  He continues to struggle with intractable right leg pain which has persisted despite physical therapy as well as steroid injections.  At this time, he would like to consider surgery.      ROS:  Reviewed on EMR and patient intake sheet.    PMH/SH:  Reviewed on EMR and patient intake sheet.    Exam:  Physical Exam    Constitutional: Well appearing; no acute distress  Eyes: pupils are equal and round  Psych: normal affect  Respiratory: non-labored breathing  Cardiovascular: regular rate and rhythm  GI: non-distended abdomen  Musculoskeletal: no pain with range of motion of the hips bilaterally  Neurologic: [4]/5 strength in the lower extremities bilaterally]; [mildly positive right] straight leg raise; no clonus; negative babinski    Radiology:     MRI demonstrates severe foraminal stenosis at L5-S1 secondary to a bilateral pars defect and resulting spondylolisthesis.  There is severe compression of the exiting L5 nerve root    Diagnosis:    Lumbar radiculopathy; L5-S1 isthmic spondylolisthesis; foraminal stenosis    Assessment and Plan:   63-year-old male with intractable lumbar radiculopathy secondary to an L5-S1 isthmic spondylolisthesis and resulting foraminal stenosis.  He has failed nonoperative management occluding physical therapy and steroid injections.  Surgical intervention would be the next step.  He would need an L5-S1 anterior lumbar interbody fusion.  Relative CPT code includes: 20237 for the interbody fusion, 61992 and 64675 for the MTF femoral ring allograft spacer and demineralized bone matrix followed by 44597 for the posterior percutaneous pedicle screw instrumentation.    We discussed surgery today at length, including the specifics of the actual proposed procedure, the projected hospital course and post-operative recovery or rehabilitation, and the expected results of this surgery.  I delineated the  potential benefits and risks of the proposed surgery, including, but not limited to those of infection, spinal fluid leaks, nerve injury or paralysis, whether that be complete or partial paralysis, thigh numbness and/or tingling, hip flexor weakness, foot drop, implant or instrumentation failure, non-union or latent instability, future adjacent segment disease, epidural hematoma, vascular injury, bowel perforation, retrograde ejaculation, wound dehiscence, reherniation, persistent pain or paresthesias, and the general risks of anaesthesia including, but not limited to those of stroke, heart attack, respiratory difficulties and death.  The patient understands that there are no guarantees in regard to outcome or potential complications associated with the proposed procedure.  After this discussion, the patient articulated understanding of the topics covered and felt that all questions had been covered and answered satisfactorily.        Prior to proceeding with surgery, the patient will need to be nicotine free.  He committed to smoking cessation today.  An extended discussion was given on the importance of smoking cessation in regards to the overall disease course in the spine.  The patient expresses understanding that smoking affects the degenerative process in the spine, accentuating degenerative disease and the symptoms there of.  The patient understands the vital role smoking plays in the post operative healing course and that smoking places the patient at extremely high risk for non-union and wound healing problems.  The patient understands that smoking is a personal, patient specific, and disease modifiable choice and that any untoward consequences in regard to further, ongoing symptoms or problems with post-operative healing, in any way attributable to nicotine use, are the sole responsibility of the patient.  The patient expressed gratitude in regards to the education given today on this topic.    He will be  tested for nicotine in 6 weeks time.  At that point, assuming the nicotine test is negative we will proceed with surgery.    The patient was in agreement with the plan. At the end of the visit today, the patient felt that all questions had been answered satisfactorily.  The patient was pleased with the visit and very appreciative for the care rendered.     Thank you very much for the kind referral.  It is a privilege, and a pleasure, to partner with you in the care of your patients.  I would be delighted to assist you with any further consultations as needed.          Marvin Herr MD    Chief of Spine Surgery, [facility]  Director of Spine Service, [facility]  , Department of Orthopaedics  [employer]  [address] [place] [address]  P: [phone]    This note was dictated with voice recognition software.  It has not been proofread for grammatical errors, typographical mistakes or other semantic inconsistencies.

## 2024-02-14 LAB
ATRIAL RATE: 44 BPM
P AXIS: 64 DEGREES
P OFFSET: 181 MS
P ONSET: 134 MS
PR INTERVAL: 182 MS
Q ONSET: 225 MS
QRS COUNT: 7 BEATS
QRS DURATION: 84 MS
QT INTERVAL: 426 MS
QTC CALCULATION(BAZETT): 364 MS
QTC FREDERICIA: 384 MS
R AXIS: 55 DEGREES
T AXIS: 92 DEGREES
T OFFSET: 438 MS
VENTRICULAR RATE: 44 BPM

## 2024-02-16 LAB — BODY SURFACE AREA: 2.11 M2

## 2024-02-19 ENCOUNTER — HOSPITAL ENCOUNTER (OUTPATIENT)
Dept: RADIOLOGY | Facility: HOSPITAL | Age: 64
End: 2024-02-19
Payer: COMMERCIAL

## 2024-02-19 ENCOUNTER — HOSPITAL ENCOUNTER (OUTPATIENT)
Dept: RADIOLOGY | Facility: HOSPITAL | Age: 64
Discharge: HOME | End: 2024-02-19
Payer: COMMERCIAL

## 2024-02-19 ENCOUNTER — HOSPITAL ENCOUNTER (OUTPATIENT)
Dept: CARDIOLOGY | Facility: HOSPITAL | Age: 64
Discharge: HOME | End: 2024-02-19
Payer: COMMERCIAL

## 2024-02-19 DIAGNOSIS — R07.9 CHEST PAIN, UNSPECIFIED TYPE: ICD-10-CM

## 2024-02-19 PROCEDURE — 93017 CV STRESS TEST TRACING ONLY: CPT

## 2024-02-19 PROCEDURE — 3430000001 HC RX 343 DIAGNOSTIC RADIOPHARMACEUTICALS: Performed by: STUDENT IN AN ORGANIZED HEALTH CARE EDUCATION/TRAINING PROGRAM

## 2024-02-19 PROCEDURE — A9502 TC99M TETROFOSMIN: HCPCS | Performed by: STUDENT IN AN ORGANIZED HEALTH CARE EDUCATION/TRAINING PROGRAM

## 2024-02-19 RX ADMIN — TETROFOSMIN 11 MILLICURIE: 0.23 INJECTION, POWDER, LYOPHILIZED, FOR SOLUTION INTRAVENOUS at 10:47

## 2024-03-08 ENCOUNTER — TRANSCRIBE ORDERS (OUTPATIENT)
Dept: ORTHOPEDIC SURGERY | Facility: HOSPITAL | Age: 64
End: 2024-03-08
Payer: MEDICAID

## 2024-03-08 DIAGNOSIS — Z01.818 PRE-OP TESTING: ICD-10-CM

## 2024-03-20 ENCOUNTER — OFFICE VISIT (OUTPATIENT)
Dept: CARDIOLOGY | Facility: HOSPITAL | Age: 64
End: 2024-03-20
Payer: COMMERCIAL

## 2024-03-20 ENCOUNTER — LAB (OUTPATIENT)
Dept: LAB | Facility: LAB | Age: 64
End: 2024-03-20
Payer: COMMERCIAL

## 2024-03-20 VITALS
DIASTOLIC BLOOD PRESSURE: 85 MMHG | BODY MASS INDEX: 25.6 KG/M2 | HEART RATE: 75 BPM | WEIGHT: 189 LBS | HEIGHT: 72 IN | OXYGEN SATURATION: 94 % | SYSTOLIC BLOOD PRESSURE: 114 MMHG

## 2024-03-20 DIAGNOSIS — I20.9 ANGINA PECTORIS (CMS-HCC): ICD-10-CM

## 2024-03-20 DIAGNOSIS — R06.09 DOE (DYSPNEA ON EXERTION): Primary | ICD-10-CM

## 2024-03-20 DIAGNOSIS — Z01.818 PRE-OP TESTING: ICD-10-CM

## 2024-03-20 DIAGNOSIS — M54.9 SEVERE BACK PAIN: ICD-10-CM

## 2024-03-20 DIAGNOSIS — Z01.810 ENCOUNTER FOR PRE-OPERATIVE CARDIOVASCULAR CLEARANCE: ICD-10-CM

## 2024-03-20 DIAGNOSIS — I47.29 NSVT (NONSUSTAINED VENTRICULAR TACHYCARDIA) (MULTI): ICD-10-CM

## 2024-03-20 PROCEDURE — 99215 OFFICE O/P EST HI 40 MIN: CPT | Performed by: STUDENT IN AN ORGANIZED HEALTH CARE EDUCATION/TRAINING PROGRAM

## 2024-03-20 PROCEDURE — 1036F TOBACCO NON-USER: CPT | Performed by: STUDENT IN AN ORGANIZED HEALTH CARE EDUCATION/TRAINING PROGRAM

## 2024-03-20 PROCEDURE — 36415 COLL VENOUS BLD VENIPUNCTURE: CPT

## 2024-03-20 PROCEDURE — 80323 ALKALOIDS NOS: CPT

## 2024-03-20 RX ORDER — METHYLPREDNISOLONE 4 MG/1
TABLET ORAL
Status: ON HOLD | COMMUNITY
Start: 2024-03-15 | End: 2024-04-17 | Stop reason: ALTCHOICE

## 2024-03-20 ASSESSMENT — PATIENT HEALTH QUESTIONNAIRE - PHQ9
SUM OF ALL RESPONSES TO PHQ9 QUESTIONS 1 AND 2: 0
2. FEELING DOWN, DEPRESSED OR HOPELESS: NOT AT ALL
1. LITTLE INTEREST OR PLEASURE IN DOING THINGS: NOT AT ALL

## 2024-03-20 ASSESSMENT — COLUMBIA-SUICIDE SEVERITY RATING SCALE - C-SSRS
6. HAVE YOU EVER DONE ANYTHING, STARTED TO DO ANYTHING, OR PREPARED TO DO ANYTHING TO END YOUR LIFE?: NO
2. HAVE YOU ACTUALLY HAD ANY THOUGHTS OF KILLING YOURSELF?: NO
1. IN THE PAST MONTH, HAVE YOU WISHED YOU WERE DEAD OR WISHED YOU COULD GO TO SLEEP AND NOT WAKE UP?: NO

## 2024-03-20 ASSESSMENT — PAIN SCALES - GENERAL: PAINLEVEL: 8

## 2024-03-20 NOTE — PROGRESS NOTES
Location of visit: [facility]   Type of Visit: Established - Last Seen: 1/31/2024     Chief Complaint:  Patient was self-referred to Cardiology palpitations.     History Of Present Illness:    Trip Brown is a 63 y.o. male, with history significant for supraventricular tachycardia status post ablation 4/2023 [facility], frequent PVCs, HTN, COPD/asthma, lung nodule, bilateral SVC, coronary calcifications per OSH CT ([facility] 6/2022), possible TIA, hepatitis C status post treatment, BPAD/MDD, prior smoking/cocaine/heroin use, who visits Cardiology today as a follow up visit  for palpitations, chest pain, and dyspnea on exertion.    He continued to have palpitations after his ablation. He wore a Zio monitor 6/2023 which showed 16% PVC burden and a Zio monitor 8/2023 which showed 17.5% PVC burden.      Echo from [facility] 9/5/2023 showed EF=60%, no significant valvular stenosis or regurgitation. At that time he was started on metoprolol succinate 25 mg daily but did not tolerate it. He was switched to Cardizem  mg but he refers that was also discontinued as it didn't help with symptoms and increased his shortness of breath.      He reported to continue to have palpitations on a daily basis, which feel the same as prior to his ablation. The symptoms can last for at least a couple of minutes. He feels fatigued and anxious with episodes. Of note, his HR on his last assessment was 45 bpm without any beta blocker or CCB.     He underwent a Holter monitor and did not tolerate the stress test due to severe back pain. The monitor showed bradycardia (54 bpm average) with a burden of VE of 3% and SVE of 3%, with additional NSVT episode up to 168 bpm. The triggered episodes were associated to sinus bradycardia.     He refers dyspnea on exertion associated to exercise related chest pain. He denies orthopnea, PND, nocturia, edema, dizziness, lightheadedness, syncope, or claudication.     Last ECG from 1/31/2024 showed sinus  bradycardia at 44 bpm, normal AV conduction, QS V1-V2, and normal ventricular repolarization.    Blood pressure today: 114/85 mmHg    HR today: 75 bpm    Past Medical History:  Supraventricular tachycardia status post ablation 4/2023 [facility], frequent PVCs, HTN, COPD/asthma, possible TIA, hepatitis C status post treatment, BPAD/MDD, active smoking, prior cocaine/heroin use, severe back pain.     Past Surgical History:  SVT ablation.     Social History:  He reports that he has never smoked. He has never used smokeless tobacco. No history on file for alcohol use and drug use.  He reports active smoking 0.5 pack a day. He has never used smokeless tobacco.     Family History:  No family history on file.    Allergies:  Bupropion    Outpatient Medications:  Current Outpatient Medications   Medication Instructions    amantadine (Symmetrel) 100 mg capsule Take 1 capsule (100 mg) by mouth 2 times a day.    aspirin 81 mg, oral, Daily RT    atorvastatin (LIPITOR) 40 mg, oral, Daily RT    baclofen (LIORESAL) 10 mg, oral, Daily PRN    cholecalciferol (VITAMIN D-3) 25 mcg, oral, Daily RT    clindamycin (Cleocin T) 1 % external solution Topical, 2 times daily    diclofenac sodium (Voltaren) 1 % gel gel Apply 4.5 inches (1 Application) topically 2 times a day as needed.    dilTIAZem CD (CARDIZEM CD) 240 mg, oral, Daily RT    FLUoxetine (PROzac) 60 mg tablet 1 capsule, oral, Daily RT    fluticasone (Flonase) 50 mcg/actuation nasal spray 1 spray, nasal, Daily RT    hydrOXYzine HCL (ATARAX) 25 mg, oral, 2 times daily PRN    ipratropium-albuteroL (Duo-Neb) 0.5-2.5 mg/3 mL nebulizer solution 3 mL, inhalation, Every 6 hours PRN    lidocaine (Lidoderm) 5 % patch 1 patch, transdermal    lithium 300 mg, oral, 2 times daily    meloxicam (MOBIC) 15 mg, oral, Daily RT    methylPREDNISolone (Medrol Dospak) 4 mg tablets Use as directed    metoprolol succinate XL (TOPROL-XL) 25 mg, oral, Daily RT    naproxen sodium (Anaprox) 550 mg tablet Take 1  tablet (550 mg) by mouth 3 times a day with meals.    nicotine (Nicoderm CQ) 21 mg/24 hr patch 1 patch, transdermal, Every 24 hours    nicotine polacrilex (COMMIT) 4 mg, Mouth/Throat, Every 2 hour PRN    OLANZapine (ZyPREXA) 7.5 mg tablet Take 1 Tablet by mouth nightly at bedtime; Indications manic-depression    pantoprazole (ProtoNix) 40 mg EC tablet Take 1 tablet (40 mg) by mouth once daily in the morning. Take before meals.    tiotropium (SPIRIVA) 18 mcg, inhalation, Daily RT    tiotropium-olodateroL (Stiolto Respimat) 2.5-2.5 mcg/actuation mist inhaler 2 Inhalations, inhalation, Daily    traZODone (DESYREL) 50 mg, oral    triamcinolone (Kenalog) 0.1 % cream Apply to affected area twice daily Monday-Friday. Take weekends off. Do not use on face, armpits, neck, or groin.    Ventolin HFA 90 mcg/actuation inhaler Inhale 2 puffs every 6 hours if needed for wheezing or shortness of breath.     Last Recorded Vitals:  Vitals:    03/20/24 1147   BP: 114/85   BP Location: Left arm   Patient Position: Sitting   BP Cuff Size: Large adult   Pulse: 75   SpO2: 94%   Weight: 85.7 kg (189 lb)   Height: 1.829 m (6')     Physical Exam:      3/20/2024    11:47 AM 2/10/2024    10:14 AM 1/31/2024    10:40 AM 12/28/2023     2:59 PM   Vitals   Systolic 114  119 143   Diastolic 85  66 91   Heart Rate 75  45 69   Temp    36.5 °C (97.7 °F)   Height (in) 1.829 m (6') 1.829 m (6') 1.829 m (6') 1.829 m (6')   Weight (lb) 189 200 194 189.8   BMI 25.63 kg/m2 27.12 kg/m2 26.31 kg/m2 25.74 kg/m2   BSA (m2) 2.09 m2 2.15 m2 2.11 m2 2.09 m2   Visit Report Report  Report Report     Wt Readings from Last 5 Encounters:   03/20/24 85.7 kg (189 lb)   01/31/24 88 kg (194 lb)   02/10/24 90.7 kg (200 lb)   12/28/23 86.1 kg (189 lb 12.8 oz)     General: Sitting up comfortably in chair; in no apparent distress.  HEENT: Normocephalic; atraumatic. Well hydrated.  Eyes: Anicteric sclera. Extraocular movement intact.  Neck: Supple; no thyromegaly; normal jugular  venous pressure, no bruits.  Respiratory: Bilateral air entry equal. No wheezing.  Cardiovascular: Normal S1, S2; no murmurs auscultated.  Abdomen: Nondistended; nontender. (+) bowel sounds.  Extremities: No peripheral edema present. Pulses 2+ diffusely.  Neurological: Oriented to time, place, and person; nonfocal.  Psychiatric: Normal affect.     Last Labs Reviewed:  Non on file.    Last Cardiology/Imaging Tests Personally Reviewed (if images available) and Interpreted:  ECG:  Encounter Date: 01/31/24   ECG 12 Lead   Result Value    Ventricular Rate 44    Atrial Rate 44    ME Interval 182    QRS Duration 84    QT Interval 426    QTC Calculation(Bazett) 364    P Axis 64    R Axis 55    T Axis 92    QRS Count 7    Q Onset 225    P Onset 134    P Offset 181    T Offset 438    QTC Fredericia 384    Narrative    Marked sinus bradycardia  Septal infarct , age undetermined     Echocardiogram:  Transthoracic echocardiogram from ([facility], no images) 9/5/2023 reported LVEF 60%, no significant valvular stenosis or regurgitation.     Cath:  No results found.    Stress Test:  No results found.    Cardiac CT/MRI:  No results found.    Other CT:  - Central and peripheral bronchial wall thickening.   - Noted is bilateral SVC.  - The thoracic aorta is normal in caliber.  - Central pulmonary arteries are normal in size.  - Cardiac chambers are normal in size.  - No pericardial effusion is noted.   - Multiple cysts are noted in both kidneys.  - Emphysema present  - Coronary Artery Calcifications: Circumflex  and Left Anterior Descending     CV RISK FACTORS:   # Hypertension: Last BP: 114/85.  # Hyperlipidemia: Last Tchol No results found for requested labs within last 365 days. / LDL No results found for requested labs within last 365 days. / HDL No results found for requested labs within last 365 days. / TRIG No results found for requested labs within last 365 days. (No results in last year.).  # Type II Diabetes Mellitus: Last A1c  No results found for requested labs within last 365 days. (No results in last year.).  # Obesity: Last BMI: 25.63.  # CKD: Last BUN/Cr (GFR): No results found for requested labs within last 365 days./No results found for requested labs within last 365 days. (No results found for requested labs within last 365 days.), No results in last year..    ASCV RISK:  The ASCVD Risk score (Loretta LAMB, et al., 2019) failed to calculate for the following reasons:    Cannot find a previous HDL lab    Cannot find a previous total cholesterol lab    Unable to determine if patient is Non-     Assessment/Plan   63 y.o. male, with history significant for supraventricular tachycardia status post ablation 4/2023 [facility], frequent PVCs, HTN, COPD/asthma, lung nodule, bilateral SVC, coronary calcifications per OSH CT ([facility] 6/2022), possible TIA, hepatitis C status post treatment, BPAD/MDD, prior smoking/cocaine/heroin use, who visits Cardiology today as a follow up visit  for palpitations, chest pain, and dyspnea on exertion. He continues to be symptomatic with dyspnea on exertion and chest pain, not tolerating stress test and with episode of nonsustained VT on Holter.    Recommendations:  - Chest pain/PARRA/NSVT/severe back pain: coronary CTA with anesthesia support  - Patient will follow up with me in the Cardiology office once the results are available  - I spent 40 minutes assessing the case between pre-charting, face-to-face patient interaction, and documentation    Anthony Membreno MD

## 2024-03-20 NOTE — PATIENT INSTRUCTIONS
Dear Trip Brown,    It was a pleasure meeting you today at the Cardiology office. As we dicussed, your clinical condition is slow heart rate, arrhythmias, chest pain and shortness of breath. I recommended coronary angiogram with anesthesia.  Please follow up with me in the Cardiology office once your results are available so we can discuss them.    Sincerely,     Anthony Membreno MD  Chest pain

## 2024-03-23 LAB
COTININE SERPL-MCNC: <5 NG/ML
NICOTINE SERPL-MCNC: <5 NG/ML

## 2024-03-25 ENCOUNTER — TRANSCRIBE ORDERS (OUTPATIENT)
Dept: ORTHOPEDIC SURGERY | Facility: HOSPITAL | Age: 64
End: 2024-03-25
Payer: MEDICAID

## 2024-03-25 DIAGNOSIS — M43.10 SPONDYLOLISTHESIS, UNSPECIFIED SPINAL REGION: ICD-10-CM

## 2024-03-25 DIAGNOSIS — M54.16 LUMBAR RADICULOPATHY: ICD-10-CM

## 2024-03-26 ENCOUNTER — OFFICE VISIT (OUTPATIENT)
Dept: UROLOGY | Facility: HOSPITAL | Age: 64
End: 2024-03-26
Payer: COMMERCIAL

## 2024-03-26 DIAGNOSIS — N40.1 BENIGN PROSTATIC HYPERPLASIA WITH LOWER URINARY TRACT SYMPTOMS, SYMPTOM DETAILS UNSPECIFIED: Primary | ICD-10-CM

## 2024-03-26 PROCEDURE — 99214 OFFICE O/P EST MOD 30 MIN: CPT | Performed by: STUDENT IN AN ORGANIZED HEALTH CARE EDUCATION/TRAINING PROGRAM

## 2024-03-26 PROCEDURE — 99204 OFFICE O/P NEW MOD 45 MIN: CPT | Performed by: STUDENT IN AN ORGANIZED HEALTH CARE EDUCATION/TRAINING PROGRAM

## 2024-03-26 PROCEDURE — 1036F TOBACCO NON-USER: CPT | Performed by: STUDENT IN AN ORGANIZED HEALTH CARE EDUCATION/TRAINING PROGRAM

## 2024-03-26 RX ORDER — TAMSULOSIN HYDROCHLORIDE 0.4 MG/1
0.4 CAPSULE ORAL DAILY
Qty: 30 CAPSULE | Refills: 2 | Status: SHIPPED | OUTPATIENT
Start: 2024-03-26 | End: 2024-05-23

## 2024-03-26 ASSESSMENT — PAIN SCALES - GENERAL: PAINLEVEL: 0-NO PAIN

## 2024-03-26 NOTE — PROGRESS NOTES
Subjective   Patient ID: Trip Brown is a 63 y.o. male    HPI  63 y.o. male who presenting with urinary symptoms including leakage of urine, increased frequency, and a very light stream. No prior interventions or medications have been given for these symptoms. Upon examination, the patient's bladder and prostate gland were discussed, with an explanation that the prostate gland enlarges with age and may compress the urethra, leading to his symptoms.      Review of Systems    All systems were reviewed. Anything negative was noted in the HPI.    Objective   Physical Exam    General: Well developed, well nourished, alert and cooperative, appears in no acute distress   Eyes: Non-injected conjunctiva, sclera clear, no proptosis   Cardiac: Extremities are warm and well perfused. No edema, cyanosis or pallor   Lungs: Breathing is easy, non-labored. Speaking in clear and complete sentences. Normal diaphragmatic movement   MSK: Ambulatory with steady gait, unassisted   Neuro: Alert and oriented to person, place, and time   Psych: Demonstrates good judgment and reason, without hallucinations, abnormal affect or abnormal behaviors   Skin: No obvious lesions, no rashes       No CVA tenderness bilaterally   No suprapubic pain or discomfort       History reviewed. No pertinent past medical history.      History reviewed. No pertinent surgical history.        Assessment/Plan   Benign prostatic hyperplasia    63 y.o. male who presents for the above condition, Today, we had a very long and extensive discussion with the patient regarding the pathophysiology, differential diagnosis, risk factor, associated condition, diagnostic work-up and management of BPH and lower urinary tract symptoms and acute urinary retention. I discussed with the patient the need to check his PSA to assess his prostate cancer risk. We discussed at length the mechanism of action, risk, benefit, adverse events and side effect of alpha-blocker in the form  of tamsulosin 0.4 mg p.o nightly. We discussed in particular the risk of hypotension, lightheadedness, dizziness, and the risk of fall and bone fracture. Also discussed retrograde ejaculation of the side effects of the medication. We had another discussion with the patient regarding lifestyle modifications including low fluid intake after 5 PM, timed voiding every 2 hours, and decrease caffeine intake. I discussed with the patient that in case he had an elevated PSA, we will proceed do an MRI of the prostate.      Plan:  - PSA today  - Renal US  - Flomax 0.4 mg/day  - Follow up with Cystoscopy in 1 months            Scribe Attestation  By signing my name below, I, Juany Doyle   attest that this documentation has been prepared under the direction and in the presence of Dr. Juan Norton

## 2024-03-28 ENCOUNTER — APPOINTMENT (OUTPATIENT)
Dept: PREADMISSION TESTING | Facility: HOSPITAL | Age: 64
End: 2024-03-28
Payer: COMMERCIAL

## 2024-04-02 ENCOUNTER — LAB (OUTPATIENT)
Dept: LAB | Facility: LAB | Age: 64
End: 2024-04-02
Payer: COMMERCIAL

## 2024-04-02 ENCOUNTER — PRE-ADMISSION TESTING (OUTPATIENT)
Dept: PREADMISSION TESTING | Facility: HOSPITAL | Age: 64
End: 2024-04-02
Payer: COMMERCIAL

## 2024-04-02 VITALS
TEMPERATURE: 97 F | HEIGHT: 72 IN | DIASTOLIC BLOOD PRESSURE: 69 MMHG | SYSTOLIC BLOOD PRESSURE: 111 MMHG | HEART RATE: 62 BPM | RESPIRATION RATE: 18 BRPM | BODY MASS INDEX: 24.63 KG/M2 | WEIGHT: 181.88 LBS | OXYGEN SATURATION: 97 %

## 2024-04-02 DIAGNOSIS — Z01.818 PREOP TESTING: ICD-10-CM

## 2024-04-02 DIAGNOSIS — N40.1 BENIGN PROSTATIC HYPERPLASIA WITH LOWER URINARY TRACT SYMPTOMS, SYMPTOM DETAILS UNSPECIFIED: ICD-10-CM

## 2024-04-02 DIAGNOSIS — F55.8 ABUSE OF OTHER NON-PSYCHOACTIVE SUBSTANCES: ICD-10-CM

## 2024-04-02 DIAGNOSIS — R73.9 ELEVATED BLOOD SUGAR: ICD-10-CM

## 2024-04-02 DIAGNOSIS — M43.10 SPONDYLOLISTHESIS, UNSPECIFIED SPINAL REGION: ICD-10-CM

## 2024-04-02 DIAGNOSIS — M54.16 LUMBAR RADICULOPATHY: ICD-10-CM

## 2024-04-02 DIAGNOSIS — R06.02 SOB (SHORTNESS OF BREATH) ON EXERTION: ICD-10-CM

## 2024-04-02 DIAGNOSIS — Z01.818 PREOP TESTING: Primary | ICD-10-CM

## 2024-04-02 LAB
ABO GROUP (TYPE) IN BLOOD: NORMAL
AMPHETAMINES UR QL SCN: NORMAL
ANION GAP SERPL CALC-SCNC: 11 MMOL/L (ref 10–20)
ANTIBODY SCREEN: NORMAL
APTT PPP: 34 SECONDS (ref 27–38)
BARBITURATES UR QL SCN: NORMAL
BASOPHILS # BLD AUTO: 0.04 X10*3/UL (ref 0–0.1)
BASOPHILS NFR BLD AUTO: 0.6 %
BENZODIAZ UR QL SCN: NORMAL
BUN SERPL-MCNC: 16 MG/DL (ref 6–23)
BZE UR QL SCN: NORMAL
CALCIUM SERPL-MCNC: 9.4 MG/DL (ref 8.6–10.3)
CANNABINOIDS UR QL SCN: NORMAL
CHLORIDE SERPL-SCNC: 108 MMOL/L (ref 98–107)
CO2 SERPL-SCNC: 22 MMOL/L (ref 21–32)
CREAT SERPL-MCNC: 1.44 MG/DL (ref 0.5–1.3)
EGFRCR SERPLBLD CKD-EPI 2021: 54 ML/MIN/1.73M*2
EOSINOPHIL # BLD AUTO: 0.13 X10*3/UL (ref 0–0.7)
EOSINOPHIL NFR BLD AUTO: 1.8 %
ERYTHROCYTE [DISTWIDTH] IN BLOOD BY AUTOMATED COUNT: 12.7 % (ref 11.5–14.5)
EST. AVERAGE GLUCOSE BLD GHB EST-MCNC: 94 MG/DL
FENTANYL+NORFENTANYL UR QL SCN: NORMAL
GLUCOSE SERPL-MCNC: 102 MG/DL (ref 74–99)
HBA1C MFR BLD: 4.9 %
HCT VFR BLD AUTO: 49.9 % (ref 41–52)
HGB BLD-MCNC: 16.8 G/DL (ref 13.5–17.5)
IMM GRANULOCYTES # BLD AUTO: 0.02 X10*3/UL (ref 0–0.7)
IMM GRANULOCYTES NFR BLD AUTO: 0.3 % (ref 0–0.9)
INR PPP: 1 (ref 0.9–1.1)
LYMPHOCYTES # BLD AUTO: 2.11 X10*3/UL (ref 1.2–4.8)
LYMPHOCYTES NFR BLD AUTO: 29.3 %
MCH RBC QN AUTO: 33.7 PG (ref 26–34)
MCHC RBC AUTO-ENTMCNC: 33.7 G/DL (ref 32–36)
MCV RBC AUTO: 100 FL (ref 80–100)
METHADONE UR QL SCN: NORMAL
MONOCYTES # BLD AUTO: 0.44 X10*3/UL (ref 0.1–1)
MONOCYTES NFR BLD AUTO: 6.1 %
NEUTROPHILS # BLD AUTO: 4.46 X10*3/UL (ref 1.2–7.7)
NEUTROPHILS NFR BLD AUTO: 61.9 %
NRBC BLD-RTO: 0 /100 WBCS (ref 0–0)
OPIATES UR QL SCN: NORMAL
OXYCODONE+OXYMORPHONE UR QL SCN: NORMAL
PCP UR QL SCN: NORMAL
PLATELET # BLD AUTO: 159 X10*3/UL (ref 150–450)
POTASSIUM SERPL-SCNC: 4.3 MMOL/L (ref 3.5–5.3)
PROTHROMBIN TIME: 11.7 SECONDS (ref 9.8–12.8)
PSA SERPL-MCNC: 2.43 NG/ML
RBC # BLD AUTO: 4.99 X10*6/UL (ref 4.5–5.9)
RH FACTOR (ANTIGEN D): NORMAL
SODIUM SERPL-SCNC: 137 MMOL/L (ref 136–145)
WBC # BLD AUTO: 7.2 X10*3/UL (ref 4.4–11.3)

## 2024-04-02 PROCEDURE — 85730 THROMBOPLASTIN TIME PARTIAL: CPT

## 2024-04-02 PROCEDURE — 84153 ASSAY OF PSA TOTAL: CPT

## 2024-04-02 PROCEDURE — 87081 CULTURE SCREEN ONLY: CPT | Mod: AHULAB | Performed by: PHYSICIAN ASSISTANT

## 2024-04-02 PROCEDURE — 86901 BLOOD TYPING SEROLOGIC RH(D): CPT

## 2024-04-02 PROCEDURE — 80307 DRUG TEST PRSMV CHEM ANLYZR: CPT

## 2024-04-02 PROCEDURE — 85610 PROTHROMBIN TIME: CPT

## 2024-04-02 PROCEDURE — 85025 COMPLETE CBC W/AUTO DIFF WBC: CPT

## 2024-04-02 PROCEDURE — 83036 HEMOGLOBIN GLYCOSYLATED A1C: CPT

## 2024-04-02 PROCEDURE — 99205 OFFICE O/P NEW HI 60 MIN: CPT | Performed by: PHYSICIAN ASSISTANT

## 2024-04-02 PROCEDURE — 86850 RBC ANTIBODY SCREEN: CPT

## 2024-04-02 PROCEDURE — 80048 BASIC METABOLIC PNL TOTAL CA: CPT

## 2024-04-02 PROCEDURE — 93005 ELECTROCARDIOGRAM TRACING: CPT | Performed by: PHYSICIAN ASSISTANT

## 2024-04-02 PROCEDURE — 86900 BLOOD TYPING SEROLOGIC ABO: CPT

## 2024-04-02 PROCEDURE — 36415 COLL VENOUS BLD VENIPUNCTURE: CPT

## 2024-04-02 RX ORDER — DEXTROMETHORPHAN HYDROBROMIDE, GUAIFENESIN 5; 100 MG/5ML; MG/5ML
650 LIQUID ORAL EVERY 8 HOURS PRN
COMMUNITY
End: 2024-04-18 | Stop reason: HOSPADM

## 2024-04-02 RX ORDER — CHLORHEXIDINE GLUCONATE ORAL RINSE 1.2 MG/ML
SOLUTION DENTAL
Qty: 475 ML | Refills: 0 | Status: ON HOLD | OUTPATIENT
Start: 2024-04-02 | End: 2024-04-17 | Stop reason: ALTCHOICE

## 2024-04-02 ASSESSMENT — ENCOUNTER SYMPTOMS
WEAKNESS: 0
WOUND: 0
SKIN CHANGES: 0
DIFFICULTY URINATING: 0
PALPITATIONS: 0
TROUBLE SWALLOWING: 0
NAUSEA: 0
ABDOMINAL DISTENTION: 0
UNEXPECTED WEIGHT CHANGE: 0
CONFUSION: 0
LIMITED RANGE OF MOTION: 0
CHILLS: 0
DYSPNEA WITH EXERTION: 1
VISUAL CHANGE: 0
DOUBLE VISION: 0
DYSURIA: 0
NUMBNESS: 0
EYE PAIN: 0
SHORTNESS OF BREATH: 0
EYE DISCHARGE: 0
RHINORRHEA: 0
DIARRHEA: 0
BLOOD IN STOOL: 0
ABDOMINAL PAIN: 0
MYALGIAS: 0
CONSTIPATION: 0
WHEEZING: 0
EXCESSIVE BLEEDING: 0
SINUS CONGESTION: 0
ARTHRALGIAS: 1
HEMOPTYSIS: 0
BRUISES/BLEEDS EASILY: 0
LIGHT-HEADEDNESS: 0
NECK STIFFNESS: 0
NECK PAIN: 0
DYSPNEA AT REST: 0
VOMITING: 0
FEVER: 0
COUGH: 0

## 2024-04-02 NOTE — CPM/PAT H&P
[facility]/PAT Evaluation       Name: Trip Brown (Trip Brown)  /Age: 1960/63 y.o.         Date of Consult: 24    Referring Provider: Dr. Herr    Surgery, Date, and Length: L5-S1 Transabdominal Approach Anterior Lumbar Fusion; L5-S1 Posterior Instrumentation  Spinal Exposure , 24, 210MIN    Trip Brown is a 63 year-old male who presents to the [facility] for perioperative risk assessment prior to surgery.    Patient presents with a primary diagnosis of lumbar spinal stenosis. He continues to struggle with intractable right leg pain which has persisted despite physical therapy as well as steroid injections. Pt wishes to proceed with surgery as planned.    This note was created in part upon personal review of patient's medical records.      Patient is scheduled to have L5-S1 Transabdominal Approach Anterior Lumbar Fusion; L5-S1 Posterior Instrumentation  Spinal Exposure      Pt denies any past history of anesthetic complications such as PONV, awareness, prolonged sedation, dental damage, aspiration, cardiac arrest, difficult intubation, difficult I.V. access or unexpected hospital admissions.  NO malignant hyperthermia and or pseudocholinesterase deficiency.  No history of blood transfusions     The patient is not a [employer] and will accept blood and blood products if medically indicated.   Type and screen sent.     Past Medical History:   Diagnosis Date    Asthma     Bipolar disorder (CMS/[facility])     CKD (chronic kidney disease)     COPD (chronic obstructive pulmonary disease) (CMS/[facility])     Polysubstance abuse (CMS/[facility])     SVT (supraventricular tachycardia) (CMS/[facility]) 2023    s/p ablation    Thrombocytopenia (CMS/[facility])        Past Surgical History:   Procedure Laterality Date    HAND SURGERY Left     LUMBAR SPINE SURGERY         Patient  has no history on file for sexual activity.    Family History   Problem Relation Name Age of Onset    Stroke Father         Allergies    Allergen Reactions    Bupropion Hallucinations       Current Outpatient Medications:     acetaminophen (Tylenol 8 HOUR) 650 mg ER tablet, Take 1 tablet (650 mg) by mouth every 8 hours if needed for mild pain (1 - 3). Do not crush, chew, or split., Disp: , Rfl:     amantadine (Symmetrel) 100 mg capsule, Take 1 capsule (100 mg) by mouth 2 times a day., Disp: , Rfl:     aspirin 81 mg EC tablet, Take 1 tablet (81 mg) by mouth once daily., Disp: , Rfl:     atorvastatin (Lipitor) 40 mg tablet, Take 1 tablet (40 mg) by mouth once daily., Disp: , Rfl:     baclofen (Lioresal) 10 mg tablet, Take 1 tablet (10 mg) by mouth once daily as needed., Disp: , Rfl:     cholecalciferol (Vitamin D-3) 25 MCG (1000 UT) tablet, Take 1 tablet (25 mcg) by mouth once daily., Disp: , Rfl:     clindamycin (Cleocin T) 1 % external solution, Apply topically twice a day., Disp: , Rfl:     diclofenac sodium (Voltaren) 1 % gel gel, Apply 4.5 inches (1 Application) topically 2 times a day as needed., Disp: , Rfl:     dilTIAZem CD (Cardizem CD) 240 mg 24 hr capsule, Take 1 capsule (240 mg) by mouth once daily., Disp: , Rfl:     FLUoxetine (PROzac) 60 mg tablet, Take 1 capsule by mouth once daily., Disp: , Rfl:     fluticasone (Flonase) 50 mcg/actuation nasal spray, Administer 1 spray into affected nostril(s) once daily., Disp: , Rfl:     hydrOXYzine HCL (Atarax) 25 mg tablet, Take 1 tablet (25 mg) by mouth 2 times a day as needed., Disp: , Rfl:     ipratropium-albuteroL (Duo-Neb) 0.5-2.5 mg/3 mL nebulizer solution, Inhale 3 mL every 6 hours if needed., Disp: , Rfl:     lidocaine (Lidoderm) 5 % patch, Place 1 patch on the skin., Disp: , Rfl:     lithium 300 mg capsule, Take 1 capsule (300 mg) by mouth twice a day., Disp: , Rfl:     meloxicam (Mobic) 15 mg tablet, Take 1 tablet (15 mg) by mouth once daily., Disp: , Rfl:     methylPREDNISolone (Medrol Dospak) 4 mg tablets, Use as directed, Disp: , Rfl:     metoprolol succinate XL (Toprol-XL) 25 mg 24  hr tablet, Take 1 tablet (25 mg) by mouth once daily., Disp: , Rfl:     naproxen sodium (Anaprox) 550 mg tablet, Take 1 tablet (550 mg) by mouth 3 times a day with meals., Disp: , Rfl:     OLANZapine (ZyPREXA) 7.5 mg tablet, Take 1 Tablet by mouth nightly at bedtime; Indications manic-depression, Disp: , Rfl:     pantoprazole (ProtoNix) 40 mg EC tablet, Take 1 tablet (40 mg) by mouth once daily in the morning. Take before meals., Disp: , Rfl:     tamsulosin (Flomax) 0.4 mg 24 hr capsule, Take 1 capsule (0.4 mg) by mouth once daily., Disp: 30 capsule, Rfl: 2    tiotropium (Spiriva) 18 mcg inhalation capsule, Place 1 capsule (18 mcg) into inhaler and inhale once daily., Disp: , Rfl:     tiotropium-olodateroL (Stiolto Respimat) 2.5-2.5 mcg/actuation mist inhaler, Inhale 2 Inhalations once daily., Disp: 1 g, Rfl: 3    traZODone (Desyrel) 100 mg tablet, Take 0.5 tablets (50 mg) by mouth., Disp: , Rfl:     triamcinolone (Kenalog) 0.1 % cream, Apply to affected area twice daily Monday-Friday. Take weekends off. Do not use on face, armpits, neck, or groin., Disp: , Rfl:     Ventolin HFA 90 mcg/actuation inhaler, Inhale 2 puffs every 6 hours if needed for wheezing or shortness of breath., Disp: , Rfl:     chlorhexidine (Peridex) 0.12 % solution, Swish for 30 seconds and spit 15mL of solution the night before and morning of surgery, Disp: 475 mL, Rfl: 0    nicotine (Nicoderm CQ) 21 mg/24 hr patch, Place 1 patch over 24 hours on the skin once every 24 hours., Disp: 30 patch, Rfl: 0    nicotine polacrilex (Commit) 4 mg lozenge, Dissolve 1 lozenge (4 mg) in the mouth every 2 hours if needed for smoking cessation., Disp: 100 lozenge, Rfl: 0    PAT ROS:   Constitutional:    no fever   no chills   no unexpected weight change  Neuro/Psych:    no numbness   no weakness   no light-headedness   no confusion  Eyes:    no discharge   no pain   no vision loss   no diplopia   no visual disturbance  Ears:    no ear pain   no hearing loss    no tinnitus  Nose:    no nasal discharge   no sinus congestion   no epistaxis  Mouth:    no dental issues   no mouth pain   no oral bleeding   no mouth lesions  Throat:    no throat pain   no dysphagia  Neck:    no neck pain   no neck stiffness  Cardio:    Functional 3 Mets. Patient admits to SOB walking up 2 flights of stairs  Cooking, cleaning, yardwork, grocery shopping      no chest pain   no palpitations   no peripheral edema   no dyspnea   PARRA  Respiratory:    no cough   no wheezing   no hemoptysis   no shortness of breath  Endocrine:    no cold intolerance   no heat intolerance  GI:    no abdominal distention   no abdominal pain   no constipation   no diarrhea   no nausea   no vomiting   no blood in stool  :    no difficulty urinating   no dysuria   no oliguria   polyuria (nocturia x3)  Musculoskeletal:    arthralgias (low back; RLE, right shoulder, right hip, b/l knees)   no myalgias   no decreased ROM  Hematologic:    does not bruise/bleed easily   no excessive bleeding   no history of blood transfusion   no blood clots  Skin:   no skin changes   no sores/wound   no rash      Physical Exam  Constitutional:       General: He is not in acute distress.     Appearance: Normal appearance. He is not ill-appearing, toxic-appearing or diaphoretic.   HENT:      Head: Normocephalic and atraumatic.      Nose: Nose normal. No rhinorrhea.      Mouth/Throat:      Pharynx: No oropharyngeal exudate.   Eyes:      Extraocular Movements: Extraocular movements intact.      Conjunctiva/sclera: Conjunctivae normal.   Cardiovascular:      Rate and Rhythm: Normal rate and regular rhythm.      Heart sounds: No murmur heard.     No friction rub. No gallop.      Comments: Functional 4 Mets. Patient denies SOB walking up 2 flights of stairs     Pulmonary:      Effort: Pulmonary effort is normal. No respiratory distress.      Breath sounds: Normal breath sounds. No stridor. No wheezing or rhonchi.   Abdominal:      General: Bowel  sounds are normal. There is no distension.      Palpations: Abdomen is soft. There is no mass.      Tenderness: There is no abdominal tenderness. There is no guarding or rebound.      Hernia: No hernia is present.   Musculoskeletal:         General: Tenderness (pain in low back when standing) present. No swelling, deformity or signs of injury. Normal range of motion.      Cervical back: Normal range of motion and neck supple. No rigidity or tenderness.   Skin:     General: Skin is warm and dry.      Coloration: Skin is not jaundiced or pale.      Findings: No bruising, erythema, lesion or rash.   Neurological:      General: No focal deficit present.      Mental Status: He is alert and oriented to person, place, and time.      Cranial Nerves: No cranial nerve deficit.      Sensory: No sensory deficit.      Motor: Weakness (RLE) present.      Coordination: Coordination normal.   Psychiatric:         Mood and Affect: Mood normal.         Behavior: Behavior normal.          PAT AIRWAY:   Airway:     Mallampati::  II    Neck ROM::  Full   Full upper and lower denture      Visit Vitals  /69   Pulse 62   Temp 36.1 °C (97 °F)   Resp 18   Ht 1.829 m (6')   Wt 82.5 kg (181 lb 14.1 oz)   SpO2 97%   BMI 24.67 kg/m²   Smoking Status Former   BSA 2.05 m²      LABS:  Lab Results   Component Value Date    WBC 7.2 04/02/2024    HGB 16.8 04/02/2024    HCT 49.9 04/02/2024     04/02/2024     04/02/2024      Lab Results   Component Value Date    GLUCOSE 102 (H) 04/02/2024    CALCIUM 9.4 04/02/2024     04/02/2024    K 4.3 04/02/2024    CO2 22 04/02/2024     (H) 04/02/2024    BUN 16 04/02/2024    CREATININE 1.44 (H) 04/02/2024   Coagulation Screen  Order: [id number]  Status: Final result       Visible to patient: Yes (not seen)       Dx: Preop testing; SOB (shortness of sienna...    0 Result Notes      Component  Ref Range & Units 14:51   Protime  9.8 - 12.8 seconds 11.7   INR  0.9 - 1.1 1.0   aPTT  27 - 38  "seconds 34   [place] [facility] [place] Results   Component Value Date    HGBA1C 4.9 04/02/2024      EKG 4/2/24  Sinus rhythm with occasional PVC  Anteroseptal infarct, age undetermined  Abnormal EKG  Vent rate = 63 bpm    Stress test 2/19/24  TECHNIQUE:  DIVISION OF NUCLEAR MEDICINE  PHARMACOLOGIC STRESS MYOCARDIAL PERFUSION SCAN, ONE DAY PROTOCOL      The patient received an intravenous dose of  11 mCi of Tc-99m  Myoview and resting emission tomographic (SPECT) images of the  myocardium were acquired. Patient was unable to tolerate imaging due to back pain.      FINDINGS:  No images were obtained      IMPRESSION:  Nondiagnostic study due to patient's intolerance to complete imaging.    US carotid 9/8/23  IMPRESSION:   Only mild plaque formation is noted in the distal right common and   proximal right internal carotid arteries.  No hemodynamically significant   flow velocities are identified in either proximal ICA.  All the velocity   parameters correlate with a stenosis estimate of 1-29% in both proximal   ICAs by NASCET criteria.   The bilateral CCAs and ECAs are unremarkable.   Vertebral arteries exhibit antegrade flow.   The subclavian artery waveforms are unremarkable bilaterally.     Assessment and Plan:       Patient is a 63-year-old male scheduled for a L5-S1 Transabdominal Approach Anterior Lumbar Fusion; L5-S1 Posterior Instrumentation Spinal Exposure with Dr. Herr on 4/17/24.    Patient has no active cardiac symptoms.   Patient denies any current chest pain, tightness, heaviness, pressure, radiating pain, palpitations, irregular heartbeats, lightheadedness, cough, congestion, shortness of breath, PARRA, PND, near syncope, weight loss or gain.     RCRI  0  , 3.9 % Risk of MACE    Cardiac:  CP/Dyspnea - pt saw cardiology - Dr. Anthony Membreno - stress test aborted due to back pain.  Pt needs to have CTAngio scheduled. Dr. Membreno has changed the order to \"STAT\" and pt willing to have this completed " "on oral sedative (as opposed to with anesthesia).  Still awaiting scheduling.     Addendum 4/4/24:  CTA scheduled 4/8/24 @ 10:30am    Pulmonary:  COPD/Asthma - cont inhalers as prescribed, including dos    Renal:  CKDIIIA  Recommendations to avoid nephrotoxic drugs and carefully monitor fluid status to maintain euvolemia. Use dose adjusted medications as needed for the underlying level of renal function.   4/2/24 Crt 1.44; GFR 54    Psych:  Polysubstance abuse - heroin, cocaine - sober since 2020  Utox is negative today    Hematology:  Thrombocytopenia  4/2/24     Patient instructed to ambulate as soon as possible postoperatively to decrease thromboembolic risk.   Initiate mechanical DVT prophylaxis as soon as possible and initiate chemical prophylaxis when deemed safe from a bleeding standpoint post surgery.     LABS: CBC, BMP, T&S, COAG, MRSA, A1c, Utox and EKG ordered    Followup: MRSA, A1c and CTA (4/8) pending    Addendum 4/4/24:  A1c results reviewed and are within acceptable range to proceed with surgery as planned.     STOP BANG: male, >50 = 2    Caprini: 4    CTA 4/8/24  IMPRESSION:  1. The LM, LAD and RCA have no significant atherosclerotic change or  stenotic disease.  2. The proximal-mid LCx has focal calcified plaque with minimal  stenosis (<25%).  3. The OM1 has noncalcified plaque with moderate stenosis (50-70%).*  4. Left dominant coronary artery system.  5. Total coronary calcium score 349.  6. Dilated main pulmonary artery (3.2 cm).  7. There is extensive cystic change of the kidneys bilaterally,  clinical correlation and dedicated imaging recommended if not  previously characterized.  8. Mild paraseptal and centrilobular emphysematous changes.    Risk assessment complete.  Patient is scheduled for a intermediate to high surgical risk procedure.      RECEIVED CARDIAC CLEARANCE FROM DR. ELLER  \"his CTA demonstrated severe CAD of a secondary branch (OM1 from the LCx) in a smaller diameter " "vessel. He should be able to proceed with the intervention with the appropriate precautions of keeping the hematocrit >30%, avoiding significant tachycardia and avoiding significant hypotension/hypertension. The decision regarding aspirin depends of the expected bleeding risk. If risk is very high, stopping aspirin preprocedure would be recommended. Please increase his atorvastatin from 40 to 80 mg for the periprocedure (starting now and one week post procedure)\"    Preoperative medication instructions were provided and reviewed with the patient.  Any additional testing or evaluation was explained to the patient.  Nothing by mouth instructions were discussed and patient's questions were answered prior to conclusion to this encounter.  Patient verbalized understanding of preoperative instructions given in preadmission testing; discharge instructions available in EMR.    This note was dictated by a speech recognition.  Minor errors may have been detected in a speech recognition.   "

## 2024-04-02 NOTE — PREPROCEDURE INSTRUCTIONS
Medication List            Accurate as of April 2, 2024  1:50 PM. Always use your most recent med list.                acetaminophen 650 mg ER tablet  Commonly known as: Tylenol 8 HOUR  Medication Adjustments for Surgery: Take morning of surgery with sip of water, no other fluids     amantadine 100 mg capsule  Commonly known as: Symmetrel  Medication Adjustments for Surgery: Take morning of surgery with sip of water, no other fluids     aspirin 81 mg EC tablet  Medication Adjustments for Surgery: Take morning of surgery with sip of water, no other fluids     atorvastatin 40 mg tablet  Commonly known as: Lipitor  Medication Adjustments for Surgery: Take morning of surgery with sip of water, no other fluids     baclofen 10 mg tablet  Commonly known as: Lioresal  Medication Adjustments for Surgery: Take morning of surgery with sip of water, no other fluids     chlorhexidine 0.12 % solution  Commonly known as: Peridex  Swish for 30 seconds and spit 15mL of solution the night before and morning of surgery     cholecalciferol 25 MCG (1000 UT) tablet  Commonly known as: Vitamin D-3  Medication Adjustments for Surgery: Continue until night before surgery     clindamycin 1 % external solution  Commonly known as: Cleocin T  Medication Adjustments for Surgery: Continue until night before surgery     dilTIAZem  mg 24 hr capsule  Commonly known as: Cardizem CD  Medication Adjustments for Surgery: Take morning of surgery with sip of water, no other fluids     FLUoxetine 60 mg tablet  Commonly known as: PROzac  Medication Adjustments for Surgery: Take morning of surgery with sip of water, no other fluids     fluticasone 50 mcg/actuation nasal spray  Commonly known as: Flonase  Notes to patient: Ok to use morning of surgery if needed     hydrOXYzine HCL 25 mg tablet  Commonly known as: Atarax  Medication Adjustments for Surgery: Take morning of surgery with sip of water, no other fluids     ipratropium-albuteroL 0.5-2.5  mg/3 mL nebulizer solution  Commonly known as: Duo-Neb  Notes to patient: Ok to use morning of surgery if needed     lidocaine 5 % patch  Commonly known as: Lidoderm  Medication Adjustments for Surgery: Continue until night before surgery     lithium 300 mg capsule  Medication Adjustments for Surgery: Take morning of surgery with sip of water, no other fluids     meloxicam 15 mg tablet  Commonly known as: Mobic  Medication Adjustments for Surgery: Stop 7 days before surgery     methylPREDNISolone 4 mg tablets  Commonly known as: Medrol Dospak  Medication Adjustments for Surgery: Take morning of surgery with sip of water, no other fluids     metoprolol succinate XL 25 mg 24 hr tablet  Commonly known as: Toprol-XL  Medication Adjustments for Surgery: Take morning of surgery with sip of water, no other fluids     naproxen sodium 550 mg tablet  Commonly known as: Anaprox  Medication Adjustments for Surgery: Stop 7 days before surgery     nicotine 21 mg/24 hr patch  Commonly known as: Nicoderm CQ  Place 1 patch over 24 hours on the skin once every 24 hours.  Medication Adjustments for Surgery: Continue until night before surgery     nicotine polacrilex 4 mg lozenge  Commonly known as: Commit  Dissolve 1 lozenge (4 mg) in the mouth every 2 hours if needed for smoking cessation.  Medication Adjustments for Surgery: Continue until night before surgery     OLANZapine 7.5 mg tablet  Commonly known as: ZyPREXA  Medication Adjustments for Surgery: Take morning of surgery with sip of water, no other fluids     pantoprazole 40 mg EC tablet  Commonly known as: ProtoNix  Medication Adjustments for Surgery: Take morning of surgery with sip of water, no other fluids     Stiolto Respimat 2.5-2.5 mcg/actuation mist inhaler  Generic drug: tiotropium-olodateroL  Inhale 2 Inhalations once daily.  Notes to patient: Ok to use morning of surgery     tamsulosin 0.4 mg 24 hr capsule  Commonly known as: Flomax  Take 1 capsule (0.4 mg) by mouth  once daily.  Medication Adjustments for Surgery: Take morning of surgery with sip of water, no other fluids     tiotropium 18 mcg inhalation capsule  Commonly known as: Spiriva  Notes to patient: Take morning of surgery     traZODone 100 mg tablet  Commonly known as: Desyrel  Medication Adjustments for Surgery: Take morning of surgery with sip of water, no other fluids     triamcinolone 0.1 % cream  Commonly known as: Kenalog  Medication Adjustments for Surgery: Continue until night before surgery     Ventolin HFA 90 mcg/actuation inhaler  Generic drug: albuterol  Notes to patient: Ok to use morning of surgery if needed     Voltaren 1 % gel  Generic drug: diclofenac sodium  Medication Adjustments for Surgery: Continue until night before surgery                          **Concerning above medication instructions, if medication is normally taken at night, continue normal schedule.**  **DO NOT TAKE NIGHT PRIOR AND MORNING OF SURGERY**    CONTACT SURGEON'S OFFICE IF YOU DEVELOP:  * Fever = 100.4 F   * New respiratory symptoms (e.g. cough, shortness of breath, respiratory distress, sore throat)  * Recent loss of taste or smell  *Flu like symptoms such as headache, fatigue or gastrointestinal symptoms  * You develop any open sores, shingles, burning or painful urination   AND/OR:  * You no longer wish to have the surgery.  * Any other personal circumstances change that may lead to the need to cancel or defer this surgery.  *You were admitted to any hospital within one week of your planned procedure.    SMOKING:  *Quitting smoking can make a huge difference to your health and recovery from surgery.    *If you need help with quitting, call 0-392-QUIT-NOW.    THE DAY BEFORE SURGERY:  *Do not eat any food after midnight the night before surgery.   *You are permitted to drink clear liquids (i.e. water, black coffee (no milk or cream), tea, apple juice and electrolyte drinks (gatorade)) up to 10 ounces, up to 2 hours before  your arrival time.  *You may chew gum until 2 hours before your surgery    SURGICAL TIME  *You will be contacted between 2 p.m. and 6 p.m. the business day before your surgery with your arrival time.  *If you haven't received a call by 6pm, call [phone].  *Scheduled surgery times may change and you will be notified if this occurs-check your personal voicemail for any updates.    ON THE MORNING OF SURGERY:  *Wear comfortable, loose fitting clothing.   *Do not use moisturizers, creams, lotions or perfume.  *All jewelry and valuables should be left at home.  *Prosthetic devices such as contact lenses, hearing aids, dentures, eyelash extensions, hairpins and body piercing must be removed before surgery.    BRING WITH YOU:  *Photo ID and insurance card  *Current list of medicines and allergies  *Pacemaker/Defibrillator/Heart stent cards  *CPAP machine and mask  *Slings/splints/crutches  *Copy of your complete Advanced Directive/DHPOA-if applicable  *Neurostimulator implant remote    PARKING AND ARRIVAL:  *Check in at the Main Entrance desk and let them know you are here for surgery.  *You will be directed to the 2nd floor surgical waiting area.    AFTER OUTPATIENT SURGERY:  *A responsible adult MUST accompany you at the time of discharge and stay with you for 24 hours after your surgery.  *You may NOT drive yourself home after surgery.  *You may use a taxi or ride sharing service (Lyft, Uber) to return home ONLY if you are accompanied by a friend or family member.  *Instructions for resuming your medications will be provided by your surgeon.

## 2024-04-02 NOTE — H&P (VIEW-ONLY)
[facility]/PAT Evaluation       Name: Trip Brown (Trip Brown)  /Age: 1960/63 y.o.         Date of Consult: 24    Referring Provider: Dr. Herr    Surgery, Date, and Length: L5-S1 Transabdominal Approach Anterior Lumbar Fusion; L5-S1 Posterior Instrumentation  Spinal Exposure , 24, 210MIN    Trip Brown is a 63 year-old male who presents to the [facility] for perioperative risk assessment prior to surgery.    Patient presents with a primary diagnosis of lumbar spinal stenosis. He continues to struggle with intractable right leg pain which has persisted despite physical therapy as well as steroid injections. Pt wishes to proceed with surgery as planned.    This note was created in part upon personal review of patient's medical records.      Patient is scheduled to have L5-S1 Transabdominal Approach Anterior Lumbar Fusion; L5-S1 Posterior Instrumentation  Spinal Exposure      Pt denies any past history of anesthetic complications such as PONV, awareness, prolonged sedation, dental damage, aspiration, cardiac arrest, difficult intubation, difficult I.V. access or unexpected hospital admissions.  NO malignant hyperthermia and or pseudocholinesterase deficiency.  No history of blood transfusions     The patient is not a Yazidi and will accept blood and blood products if medically indicated.   Type and screen sent.     Past Medical History:   Diagnosis Date    Asthma     Bipolar disorder (CMS/formerly [facility])     CKD (chronic kidney disease)     COPD (chronic obstructive pulmonary disease) (CMS/formerly [facility])     Polysubstance abuse (CMS/formerly [facility])     SVT (supraventricular tachycardia) (CMS/formerly [facility]) 2023    s/p ablation    Thrombocytopenia (CMS/formerly [facility])        Past Surgical History:   Procedure Laterality Date    HAND SURGERY Left     LUMBAR SPINE SURGERY         Patient  has no history on file for sexual activity.    Family History   Problem Relation Name Age of Onset    Stroke Father         Allergies    Allergen Reactions    Bupropion Hallucinations       Current Outpatient Medications:     acetaminophen (Tylenol 8 HOUR) 650 mg ER tablet, Take 1 tablet (650 mg) by mouth every 8 hours if needed for mild pain (1 - 3). Do not crush, chew, or split., Disp: , Rfl:     amantadine (Symmetrel) 100 mg capsule, Take 1 capsule (100 mg) by mouth 2 times a day., Disp: , Rfl:     aspirin 81 mg EC tablet, Take 1 tablet (81 mg) by mouth once daily., Disp: , Rfl:     atorvastatin (Lipitor) 40 mg tablet, Take 1 tablet (40 mg) by mouth once daily., Disp: , Rfl:     baclofen (Lioresal) 10 mg tablet, Take 1 tablet (10 mg) by mouth once daily as needed., Disp: , Rfl:     cholecalciferol (Vitamin D-3) 25 MCG (1000 UT) tablet, Take 1 tablet (25 mcg) by mouth once daily., Disp: , Rfl:     clindamycin (Cleocin T) 1 % external solution, Apply topically twice a day., Disp: , Rfl:     diclofenac sodium (Voltaren) 1 % gel gel, Apply 4.5 inches (1 Application) topically 2 times a day as needed., Disp: , Rfl:     dilTIAZem CD (Cardizem CD) 240 mg 24 hr capsule, Take 1 capsule (240 mg) by mouth once daily., Disp: , Rfl:     FLUoxetine (PROzac) 60 mg tablet, Take 1 capsule by mouth once daily., Disp: , Rfl:     fluticasone (Flonase) 50 mcg/actuation nasal spray, Administer 1 spray into affected nostril(s) once daily., Disp: , Rfl:     hydrOXYzine HCL (Atarax) 25 mg tablet, Take 1 tablet (25 mg) by mouth 2 times a day as needed., Disp: , Rfl:     ipratropium-albuteroL (Duo-Neb) 0.5-2.5 mg/3 mL nebulizer solution, Inhale 3 mL every 6 hours if needed., Disp: , Rfl:     lidocaine (Lidoderm) 5 % patch, Place 1 patch on the skin., Disp: , Rfl:     lithium 300 mg capsule, Take 1 capsule (300 mg) by mouth twice a day., Disp: , Rfl:     meloxicam (Mobic) 15 mg tablet, Take 1 tablet (15 mg) by mouth once daily., Disp: , Rfl:     methylPREDNISolone (Medrol Dospak) 4 mg tablets, Use as directed, Disp: , Rfl:     metoprolol succinate XL (Toprol-XL) 25 mg 24  hr tablet, Take 1 tablet (25 mg) by mouth once daily., Disp: , Rfl:     naproxen sodium (Anaprox) 550 mg tablet, Take 1 tablet (550 mg) by mouth 3 times a day with meals., Disp: , Rfl:     OLANZapine (ZyPREXA) 7.5 mg tablet, Take 1 Tablet by mouth nightly at bedtime; Indications manic-depression, Disp: , Rfl:     pantoprazole (ProtoNix) 40 mg EC tablet, Take 1 tablet (40 mg) by mouth once daily in the morning. Take before meals., Disp: , Rfl:     tamsulosin (Flomax) 0.4 mg 24 hr capsule, Take 1 capsule (0.4 mg) by mouth once daily., Disp: 30 capsule, Rfl: 2    tiotropium (Spiriva) 18 mcg inhalation capsule, Place 1 capsule (18 mcg) into inhaler and inhale once daily., Disp: , Rfl:     tiotropium-olodateroL (Stiolto Respimat) 2.5-2.5 mcg/actuation mist inhaler, Inhale 2 Inhalations once daily., Disp: 1 g, Rfl: 3    traZODone (Desyrel) 100 mg tablet, Take 0.5 tablets (50 mg) by mouth., Disp: , Rfl:     triamcinolone (Kenalog) 0.1 % cream, Apply to affected area twice daily Monday-Friday. Take weekends off. Do not use on face, armpits, neck, or groin., Disp: , Rfl:     Ventolin HFA 90 mcg/actuation inhaler, Inhale 2 puffs every 6 hours if needed for wheezing or shortness of breath., Disp: , Rfl:     chlorhexidine (Peridex) 0.12 % solution, Swish for 30 seconds and spit 15mL of solution the night before and morning of surgery, Disp: 475 mL, Rfl: 0    nicotine (Nicoderm CQ) 21 mg/24 hr patch, Place 1 patch over 24 hours on the skin once every 24 hours., Disp: 30 patch, Rfl: 0    nicotine polacrilex (Commit) 4 mg lozenge, Dissolve 1 lozenge (4 mg) in the mouth every 2 hours if needed for smoking cessation., Disp: 100 lozenge, Rfl: 0    PAT ROS:   Constitutional:    no fever   no chills   no unexpected weight change  Neuro/Psych:    no numbness   no weakness   no light-headedness   no confusion  Eyes:    no discharge   no pain   no vision loss   no diplopia   no visual disturbance  Ears:    no ear pain   no hearing loss    no tinnitus  Nose:    no nasal discharge   no sinus congestion   no epistaxis  Mouth:    no dental issues   no mouth pain   no oral bleeding   no mouth lesions  Throat:    no throat pain   no dysphagia  Neck:    no neck pain   no neck stiffness  Cardio:    Functional 3 Mets. Patient admits to SOB walking up 2 flights of stairs  Cooking, cleaning, yardwork, grocery shopping      no chest pain   no palpitations   no peripheral edema   no dyspnea   PARRA  Respiratory:    no cough   no wheezing   no hemoptysis   no shortness of breath  Endocrine:    no cold intolerance   no heat intolerance  GI:    no abdominal distention   no abdominal pain   no constipation   no diarrhea   no nausea   no vomiting   no blood in stool  :    no difficulty urinating   no dysuria   no oliguria   polyuria (nocturia x3)  Musculoskeletal:    arthralgias (low back; RLE, right shoulder, right hip, b/l knees)   no myalgias   no decreased ROM  Hematologic:    does not bruise/bleed easily   no excessive bleeding   no history of blood transfusion   no blood clots  Skin:   no skin changes   no sores/wound   no rash      Physical Exam  Constitutional:       General: He is not in acute distress.     Appearance: Normal appearance. He is not ill-appearing, toxic-appearing or diaphoretic.   HENT:      Head: Normocephalic and atraumatic.      Nose: Nose normal. No rhinorrhea.      Mouth/Throat:      Pharynx: No oropharyngeal exudate.   Eyes:      Extraocular Movements: Extraocular movements intact.      Conjunctiva/sclera: Conjunctivae normal.   Cardiovascular:      Rate and Rhythm: Normal rate and regular rhythm.      Heart sounds: No murmur heard.     No friction rub. No gallop.      Comments: Functional 4 Mets. Patient denies SOB walking up 2 flights of stairs     Pulmonary:      Effort: Pulmonary effort is normal. No respiratory distress.      Breath sounds: Normal breath sounds. No stridor. No wheezing or rhonchi.   Abdominal:      General: Bowel  sounds are normal. There is no distension.      Palpations: Abdomen is soft. There is no mass.      Tenderness: There is no abdominal tenderness. There is no guarding or rebound.      Hernia: No hernia is present.   Musculoskeletal:         General: Tenderness (pain in low back when standing) present. No swelling, deformity or signs of injury. Normal range of motion.      Cervical back: Normal range of motion and neck supple. No rigidity or tenderness.   Skin:     General: Skin is warm and dry.      Coloration: Skin is not jaundiced or pale.      Findings: No bruising, erythema, lesion or rash.   Neurological:      General: No focal deficit present.      Mental Status: He is alert and oriented to person, place, and time.      Cranial Nerves: No cranial nerve deficit.      Sensory: No sensory deficit.      Motor: Weakness (RLE) present.      Coordination: Coordination normal.   Psychiatric:         Mood and Affect: Mood normal.         Behavior: Behavior normal.          PAT AIRWAY:   Airway:     Mallampati::  II    Neck ROM::  Full   Full upper and lower denture      Visit Vitals  /69   Pulse 62   Temp 36.1 °C (97 °F)   Resp 18   Ht 1.829 m (6')   Wt 82.5 kg (181 lb 14.1 oz)   SpO2 97%   BMI 24.67 kg/m²   Smoking Status Former   BSA 2.05 m²      LABS:  Lab Results   Component Value Date    WBC 7.2 04/02/2024    HGB 16.8 04/02/2024    HCT 49.9 04/02/2024     04/02/2024     04/02/2024      Lab Results   Component Value Date    GLUCOSE 102 (H) 04/02/2024    CALCIUM 9.4 04/02/2024     04/02/2024    K 4.3 04/02/2024    CO2 22 04/02/2024     (H) 04/02/2024    BUN 16 04/02/2024    CREATININE 1.44 (H) 04/02/2024   Coagulation Screen  Order: [id number]  Status: Final result       Visible to patient: Yes (not seen)       Dx: Preop testing; SOB (shortness of sienna...    0 Result Notes      Component  Ref Range & Units 14:51   Protime  9.8 - 12.8 seconds 11.7   INR  0.9 - 1.1 1.0   aPTT  27 - 38  "seconds 34   Resulting Agency [facility] [place]        Lab Results   Component Value Date    HGBA1C 4.9 04/02/2024      EKG 4/2/24  Sinus rhythm with occasional PVC  Anteroseptal infarct, age undetermined  Abnormal EKG  Vent rate = 63 bpm    Stress test 2/19/24  TECHNIQUE:  DIVISION OF NUCLEAR MEDICINE  PHARMACOLOGIC STRESS MYOCARDIAL PERFUSION SCAN, ONE DAY PROTOCOL      The patient received an intravenous dose of  11 mCi of Tc-99m  Myoview and resting emission tomographic (SPECT) images of the  myocardium were acquired. Patient was unable to tolerate imaging due to back pain.      FINDINGS:  No images were obtained      IMPRESSION:  Nondiagnostic study due to patient's intolerance to complete imaging.    US carotid 9/8/23  IMPRESSION:   Only mild plaque formation is noted in the distal right common and   proximal right internal carotid arteries.  No hemodynamically significant   flow velocities are identified in either proximal ICA.  All the velocity   parameters correlate with a stenosis estimate of 1-29% in both proximal   ICAs by NASCET criteria.   The bilateral CCAs and ECAs are unremarkable.   Vertebral arteries exhibit antegrade flow.   The subclavian artery waveforms are unremarkable bilaterally.     Assessment and Plan:       Patient is a 63-year-old male scheduled for a L5-S1 Transabdominal Approach Anterior Lumbar Fusion; L5-S1 Posterior Instrumentation Spinal Exposure with Dr. Herr on 4/17/24.    Patient has no active cardiac symptoms.   Patient denies any current chest pain, tightness, heaviness, pressure, radiating pain, palpitations, irregular heartbeats, lightheadedness, cough, congestion, shortness of breath, PARRA, PND, near syncope, weight loss or gain.     RCRI  0  , 3.9 % Risk of MACE    Cardiac:  CP/Dyspnea - pt saw cardiology - Dr. Anthony Membreno - stress test aborted due to back pain.  Pt needs to have CTAngio scheduled. Dr. Membreno has changed the order to \"STAT\" and pt willing to have this completed " "on oral sedative (as opposed to with anesthesia).  Still awaiting scheduling.     Addendum 4/4/24:  CTA scheduled 4/8/24 @ 10:30am    Pulmonary:  COPD/Asthma - cont inhalers as prescribed, including dos    Renal:  CKDIIIA  Recommendations to avoid nephrotoxic drugs and carefully monitor fluid status to maintain euvolemia. Use dose adjusted medications as needed for the underlying level of renal function.   4/2/24 Crt 1.44; GFR 54    Psych:  Polysubstance abuse - heroin, cocaine - sober since 2020  Utox is negative today    Hematology:  Thrombocytopenia  4/2/24     Patient instructed to ambulate as soon as possible postoperatively to decrease thromboembolic risk.   Initiate mechanical DVT prophylaxis as soon as possible and initiate chemical prophylaxis when deemed safe from a bleeding standpoint post surgery.     LABS: CBC, BMP, T&S, COAG, MRSA, A1c, Utox and EKG ordered    Followup: MRSA, A1c and CTA (4/8) pending    Addendum 4/4/24:  A1c results reviewed and are within acceptable range to proceed with surgery as planned.     STOP BANG: male, >50 = 2    Caprini: 4    CTA 4/8/24  IMPRESSION:  1. The LM, LAD and RCA have no significant atherosclerotic change or  stenotic disease.  2. The proximal-mid LCx has focal calcified plaque with minimal  stenosis (<25%).  3. The OM1 has noncalcified plaque with moderate stenosis (50-70%).*  4. Left dominant coronary artery system.  5. Total coronary calcium score 349.  6. Dilated main pulmonary artery (3.2 cm).  7. There is extensive cystic change of the kidneys bilaterally,  clinical correlation and dedicated imaging recommended if not  previously characterized.  8. Mild paraseptal and centrilobular emphysematous changes.    Risk assessment complete.  Patient is scheduled for a intermediate to high surgical risk procedure.      RECEIVED CARDIAC CLEARANCE FROM DR. ELLER  \"his CTA demonstrated severe CAD of a secondary branch (OM1 from the LCx) in a smaller diameter " "vessel. He should be able to proceed with the intervention with the appropriate precautions of keeping the hematocrit >30%, avoiding significant tachycardia and avoiding significant hypotension/hypertension. The decision regarding aspirin depends of the expected bleeding risk. If risk is very high, stopping aspirin preprocedure would be recommended. Please increase his atorvastatin from 40 to 80 mg for the periprocedure (starting now and one week post procedure)\"    Preoperative medication instructions were provided and reviewed with the patient.  Any additional testing or evaluation was explained to the patient.  Nothing by mouth instructions were discussed and patient's questions were answered prior to conclusion to this encounter.  Patient verbalized understanding of preoperative instructions given in preadmission testing; discharge instructions available in EMR.    This note was dictated by a speech recognition.  Minor errors may have been detected in a speech recognition.   "

## 2024-04-03 LAB
ATRIAL RATE: 63 BPM
P AXIS: 61 DEGREES
P OFFSET: 188 MS
P ONSET: 139 MS
PR INTERVAL: 174 MS
Q ONSET: 226 MS
QRS COUNT: 11 BEATS
QRS DURATION: 80 MS
QT INTERVAL: 388 MS
QTC CALCULATION(BAZETT): 397 MS
QTC FREDERICIA: 394 MS
R AXIS: 4 DEGREES
T AXIS: 65 DEGREES
T OFFSET: 420 MS
VENTRICULAR RATE: 63 BPM

## 2024-04-04 LAB — STAPHYLOCOCCUS SPEC CULT: NORMAL

## 2024-04-05 ENCOUNTER — APPOINTMENT (OUTPATIENT)
Dept: RADIOLOGY | Facility: HOSPITAL | Age: 64
End: 2024-04-05
Payer: COMMERCIAL

## 2024-04-08 ENCOUNTER — HOSPITAL ENCOUNTER (OUTPATIENT)
Dept: RADIOLOGY | Facility: HOSPITAL | Age: 64
Discharge: HOME | End: 2024-04-08
Payer: COMMERCIAL

## 2024-04-08 VITALS — DIASTOLIC BLOOD PRESSURE: 54 MMHG | SYSTOLIC BLOOD PRESSURE: 94 MMHG | OXYGEN SATURATION: 98 % | HEART RATE: 58 BPM

## 2024-04-08 DIAGNOSIS — R93.1 ABNORMAL FINDINGS ON DIAGNOSTIC IMAGING OF HEART AND CORONARY CIRCULATION: ICD-10-CM

## 2024-04-08 DIAGNOSIS — I47.29 NSVT (NONSUSTAINED VENTRICULAR TACHYCARDIA) (MULTI): ICD-10-CM

## 2024-04-08 DIAGNOSIS — Z01.810 ENCOUNTER FOR PRE-OPERATIVE CARDIOVASCULAR CLEARANCE: ICD-10-CM

## 2024-04-08 DIAGNOSIS — M54.9 SEVERE BACK PAIN: ICD-10-CM

## 2024-04-08 DIAGNOSIS — R06.09 DOE (DYSPNEA ON EXERTION): ICD-10-CM

## 2024-04-08 DIAGNOSIS — I20.9 ANGINA PECTORIS (CMS-HCC): ICD-10-CM

## 2024-04-08 PROCEDURE — 75580 N-INVAS EST C FFR SW ALY CTA: CPT

## 2024-04-08 PROCEDURE — 2500000001 HC RX 250 WO HCPCS SELF ADMINISTERED DRUGS (ALT 637 FOR MEDICARE OP): Performed by: INTERNAL MEDICINE

## 2024-04-08 PROCEDURE — 75574 CT ANGIO HRT W/3D IMAGE: CPT

## 2024-04-08 PROCEDURE — 2550000001 HC RX 255 CONTRASTS: Performed by: STUDENT IN AN ORGANIZED HEALTH CARE EDUCATION/TRAINING PROGRAM

## 2024-04-08 RX ORDER — METOPROLOL TARTRATE 100 MG/1
100 TABLET ORAL ONCE AS NEEDED
Status: DISCONTINUED | OUTPATIENT
Start: 2024-04-08 | End: 2024-04-09 | Stop reason: HOSPADM

## 2024-04-08 RX ORDER — METOPROLOL TARTRATE 1 MG/ML
5 INJECTION, SOLUTION INTRAVENOUS ONCE
Status: DISCONTINUED | OUTPATIENT
Start: 2024-04-08 | End: 2024-04-09 | Stop reason: HOSPADM

## 2024-04-08 RX ORDER — METOPROLOL TARTRATE 1 MG/ML
5 INJECTION, SOLUTION INTRAVENOUS ONCE AS NEEDED
Status: DISCONTINUED | OUTPATIENT
Start: 2024-04-08 | End: 2024-04-09 | Stop reason: HOSPADM

## 2024-04-08 RX ORDER — METOPROLOL TARTRATE 100 MG/1
100 TABLET ORAL ONCE
Status: DISCONTINUED | OUTPATIENT
Start: 2024-04-08 | End: 2024-04-09 | Stop reason: HOSPADM

## 2024-04-08 RX ORDER — NITROGLYCERIN 0.4 MG/1
0.8 TABLET SUBLINGUAL ONCE
Status: COMPLETED | OUTPATIENT
Start: 2024-04-08 | End: 2024-04-08

## 2024-04-08 RX ORDER — LORAZEPAM 2 MG/ML
0.5 INJECTION INTRAMUSCULAR EVERY 5 MIN PRN
Status: DISCONTINUED | OUTPATIENT
Start: 2024-04-08 | End: 2024-04-09 | Stop reason: HOSPADM

## 2024-04-08 RX ADMIN — NITROGLYCERIN 0.8 MG: 0.4 TABLET SUBLINGUAL at 11:18

## 2024-04-08 RX ADMIN — IOHEXOL 90 ML: 350 INJECTION, SOLUTION INTRAVENOUS at 11:30

## 2024-04-17 ENCOUNTER — APPOINTMENT (OUTPATIENT)
Dept: RADIOLOGY | Facility: HOSPITAL | Age: 64
DRG: 460 | End: 2024-04-17
Payer: COMMERCIAL

## 2024-04-17 ENCOUNTER — HOSPITAL ENCOUNTER (INPATIENT)
Facility: HOSPITAL | Age: 64
LOS: 1 days | Discharge: HOME HEALTH | DRG: 460 | End: 2024-04-18
Attending: ORTHOPAEDIC SURGERY | Admitting: ORTHOPAEDIC SURGERY
Payer: COMMERCIAL

## 2024-04-17 ENCOUNTER — ANESTHESIA (OUTPATIENT)
Dept: OPERATING ROOM | Facility: HOSPITAL | Age: 64
DRG: 460 | End: 2024-04-17
Payer: COMMERCIAL

## 2024-04-17 ENCOUNTER — ANESTHESIA EVENT (OUTPATIENT)
Dept: OPERATING ROOM | Facility: HOSPITAL | Age: 64
DRG: 460 | End: 2024-04-17
Payer: COMMERCIAL

## 2024-04-17 DIAGNOSIS — Z74.09 MOBILITY IMPAIRED: ICD-10-CM

## 2024-04-17 DIAGNOSIS — M54.16 LUMBAR RADICULOPATHY: ICD-10-CM

## 2024-04-17 DIAGNOSIS — M43.10 SPONDYLOLISTHESIS, UNSPECIFIED SPINAL REGION: ICD-10-CM

## 2024-04-17 DIAGNOSIS — M43.10 SPONDYLOLISTHESIS: Primary | ICD-10-CM

## 2024-04-17 LAB
ABO GROUP (TYPE) IN BLOOD: NORMAL
RH FACTOR (ANTIGEN D): NORMAL

## 2024-04-17 PROCEDURE — 3600000018 HC OR TIME - INITIAL BASE CHARGE - PROCEDURE LEVEL SIX: Performed by: ORTHOPAEDIC SURGERY

## 2024-04-17 PROCEDURE — 2500000004 HC RX 250 GENERAL PHARMACY W/ HCPCS (ALT 636 FOR OP/ED): Performed by: ORTHOPAEDIC SURGERY

## 2024-04-17 PROCEDURE — 2500000006 HC RX 250 W HCPCS SELF ADMINISTERED DRUGS (ALT 637 FOR ALL PAYERS): Mod: MUE | Performed by: PHYSICIAN ASSISTANT

## 2024-04-17 PROCEDURE — 7100000001 HC RECOVERY ROOM TIME - INITIAL BASE CHARGE: Performed by: ORTHOPAEDIC SURGERY

## 2024-04-17 PROCEDURE — 7100000002 HC RECOVERY ROOM TIME - EACH INCREMENTAL 1 MINUTE: Performed by: ORTHOPAEDIC SURGERY

## 2024-04-17 PROCEDURE — 36415 COLL VENOUS BLD VENIPUNCTURE: CPT | Performed by: ORTHOPAEDIC SURGERY

## 2024-04-17 PROCEDURE — 3700000002 HC GENERAL ANESTHESIA TIME - EACH INCREMENTAL 1 MINUTE: Performed by: ORTHOPAEDIC SURGERY

## 2024-04-17 PROCEDURE — 2500000005 HC RX 250 GENERAL PHARMACY W/O HCPCS: Performed by: ORTHOPAEDIC SURGERY

## 2024-04-17 PROCEDURE — 97161 PT EVAL LOW COMPLEX 20 MIN: CPT | Mod: GP

## 2024-04-17 PROCEDURE — 3E0V3GB INTRODUCTION OF RECOMBINANT BONE MORPHOGENETIC PROTEIN INTO BONES, PERCUTANEOUS APPROACH: ICD-10-PCS | Performed by: SURGERY

## 2024-04-17 PROCEDURE — C1713 ANCHOR/SCREW BN/BN,TIS/BN: HCPCS | Performed by: ORTHOPAEDIC SURGERY

## 2024-04-17 PROCEDURE — C1769 GUIDE WIRE: HCPCS | Performed by: ORTHOPAEDIC SURGERY

## 2024-04-17 PROCEDURE — 2500000005 HC RX 250 GENERAL PHARMACY W/O HCPCS: Performed by: ANESTHESIOLOGY

## 2024-04-17 PROCEDURE — 22558 ARTHRD ANT NTRBD MIN DSC LUM: CPT | Performed by: SURGERY

## 2024-04-17 PROCEDURE — 22558 ARTHRD ANT NTRBD MIN DSC LUM: CPT | Performed by: PHYSICIAN ASSISTANT

## 2024-04-17 PROCEDURE — A22558 PR ARTHRODESIS ANT INTERBODY MIN DISCECTOMY,LUMBAR: Performed by: INTERNAL MEDICINE

## 2024-04-17 PROCEDURE — 2500000004 HC RX 250 GENERAL PHARMACY W/ HCPCS (ALT 636 FOR OP/ED): Performed by: PHYSICIAN ASSISTANT

## 2024-04-17 PROCEDURE — 22840 INSERT SPINE FIXATION DEVICE: CPT | Performed by: PHYSICIAN ASSISTANT

## 2024-04-17 PROCEDURE — 97530 THERAPEUTIC ACTIVITIES: CPT | Mod: GP

## 2024-04-17 PROCEDURE — C1821 INTERSPINOUS IMPLANT: HCPCS | Performed by: ORTHOPAEDIC SURGERY

## 2024-04-17 PROCEDURE — 94640 AIRWAY INHALATION TREATMENT: CPT

## 2024-04-17 PROCEDURE — A22558 PR ARTHRODESIS ANT INTERBODY MIN DISCECTOMY,LUMBAR: Performed by: ANESTHESIOLOGY

## 2024-04-17 PROCEDURE — 2780000003 HC OR 278 NO HCPCS: Performed by: ORTHOPAEDIC SURGERY

## 2024-04-17 PROCEDURE — 2720000007 HC OR 272 NO HCPCS: Performed by: ORTHOPAEDIC SURGERY

## 2024-04-17 PROCEDURE — 0SG30A0 FUSION OF LUMBOSACRAL JOINT WITH INTERBODY FUSION DEVICE, ANTERIOR APPROACH, ANTERIOR COLUMN, OPEN APPROACH: ICD-10-PCS | Performed by: SURGERY

## 2024-04-17 PROCEDURE — 2500000005 HC RX 250 GENERAL PHARMACY W/O HCPCS: Performed by: INTERNAL MEDICINE

## 2024-04-17 PROCEDURE — 1100000001 HC PRIVATE ROOM DAILY

## 2024-04-17 PROCEDURE — 3600000017 HC OR TIME - EACH INCREMENTAL 1 MINUTE - PROCEDURE LEVEL SIX: Performed by: ORTHOPAEDIC SURGERY

## 2024-04-17 PROCEDURE — 0ST40ZZ RESECTION OF LUMBOSACRAL DISC, OPEN APPROACH: ICD-10-PCS | Performed by: SURGERY

## 2024-04-17 PROCEDURE — 76000 FLUOROSCOPY <1 HR PHYS/QHP: CPT

## 2024-04-17 PROCEDURE — 2500000001 HC RX 250 WO HCPCS SELF ADMINISTERED DRUGS (ALT 637 FOR MEDICARE OP): Performed by: PHYSICIAN ASSISTANT

## 2024-04-17 PROCEDURE — 2500000002 HC RX 250 W HCPCS SELF ADMINISTERED DRUGS (ALT 637 FOR MEDICARE OP, ALT 636 FOR OP/ED): Mod: MUE | Performed by: PHYSICIAN ASSISTANT

## 2024-04-17 PROCEDURE — 3700000001 HC GENERAL ANESTHESIA TIME - INITIAL BASE CHARGE: Performed by: ORTHOPAEDIC SURGERY

## 2024-04-17 PROCEDURE — 2500000004 HC RX 250 GENERAL PHARMACY W/ HCPCS (ALT 636 FOR OP/ED): Performed by: INTERNAL MEDICINE

## 2024-04-17 PROCEDURE — 2500000004 HC RX 250 GENERAL PHARMACY W/ HCPCS (ALT 636 FOR OP/ED): Performed by: ANESTHESIOLOGY

## 2024-04-17 PROCEDURE — A4217 STERILE WATER/SALINE, 500 ML: HCPCS | Performed by: ORTHOPAEDIC SURGERY

## 2024-04-17 DEVICE — IMPLANTABLE DEVICE: Type: IMPLANTABLE DEVICE | Site: SPINE LUMBAR | Status: FUNCTIONAL

## 2024-04-17 DEVICE — BONE CHIPS, CANCELL 15CC 1-4MM: Type: IMPLANTABLE DEVICE | Site: SPINE LUMBAR | Status: FUNCTIONAL

## 2024-04-17 DEVICE — BONE GRAFT KIT 7510100 INFUSE X SMALL
Type: IMPLANTABLE DEVICE | Site: SPINE LUMBAR | Status: FUNCTIONAL
Brand: INFUSE® BONE GRAFT

## 2024-04-17 DEVICE — SCREW SET, SOLERA VOYAGER, 5.5/6.0: Type: IMPLANTABLE DEVICE | Site: SPINE LUMBAR | Status: FUNCTIONAL

## 2024-04-17 DEVICE — ORTHOBLAST II PUTTY, 5CC - DERIVED FROM SELECTED DONATED HUMAN BONE TISSUE THAT HAS BEEN PROCESSED INTO PARTICLES. THE PARTICLES ARE SUBSEQUENTLY DEMINERALIZED USING A HYDROCHLORIC ACID PROCESS. THE DEMINERALIZED BONE MATRIX (DBM) IS COMBINED WITH A REVERSE PHASE CARRIER, CANCELLOUS CHIPS FROM THE SAME DONOR, AND THEN FORMULATED TO A PASTE OR PUTTY-LIKE CONSISTENCY.
Type: IMPLANTABLE DEVICE | Site: SPINE LUMBAR | Status: FUNCTIONAL
Brand: ORTHOBLAST II PUTTY

## 2024-04-17 RX ORDER — TAMSULOSIN HYDROCHLORIDE 0.4 MG/1
0.4 CAPSULE ORAL DAILY
Status: DISCONTINUED | OUTPATIENT
Start: 2024-04-17 | End: 2024-04-18 | Stop reason: HOSPADM

## 2024-04-17 RX ORDER — DOCUSATE SODIUM 100 MG/1
100 CAPSULE, LIQUID FILLED ORAL 2 TIMES DAILY
Status: DISCONTINUED | OUTPATIENT
Start: 2024-04-17 | End: 2024-04-18 | Stop reason: HOSPADM

## 2024-04-17 RX ORDER — PANTOPRAZOLE SODIUM 40 MG/1
40 TABLET, DELAYED RELEASE ORAL
Status: DISCONTINUED | OUTPATIENT
Start: 2024-04-18 | End: 2024-04-18 | Stop reason: HOSPADM

## 2024-04-17 RX ORDER — ONDANSETRON HYDROCHLORIDE 2 MG/ML
INJECTION, SOLUTION INTRAVENOUS AS NEEDED
Status: DISCONTINUED | OUTPATIENT
Start: 2024-04-17 | End: 2024-04-17

## 2024-04-17 RX ORDER — DEXAMETHASONE SODIUM PHOSPHATE 100 MG/10ML
INJECTION INTRAMUSCULAR; INTRAVENOUS AS NEEDED
Status: DISCONTINUED | OUTPATIENT
Start: 2024-04-17 | End: 2024-04-17

## 2024-04-17 RX ORDER — AMANTADINE HYDROCHLORIDE 100 MG/1
100 CAPSULE, GELATIN COATED ORAL 2 TIMES DAILY
Status: DISCONTINUED | OUTPATIENT
Start: 2024-04-17 | End: 2024-04-18 | Stop reason: HOSPADM

## 2024-04-17 RX ORDER — SYRING-NEEDL,DISP,INSUL,0.3 ML 29 G X1/2"
297 SYRINGE, EMPTY DISPOSABLE MISCELLANEOUS ONCE AS NEEDED
Status: DISCONTINUED | OUTPATIENT
Start: 2024-04-17 | End: 2024-04-18 | Stop reason: HOSPADM

## 2024-04-17 RX ORDER — PHENYLEPHRINE HCL IN 0.9% NACL 1 MG/10 ML
SYRINGE (ML) INTRAVENOUS AS NEEDED
Status: DISCONTINUED | OUTPATIENT
Start: 2024-04-17 | End: 2024-04-17

## 2024-04-17 RX ORDER — MIDAZOLAM HYDROCHLORIDE 1 MG/ML
INJECTION INTRAMUSCULAR; INTRAVENOUS AS NEEDED
Status: DISCONTINUED | OUTPATIENT
Start: 2024-04-17 | End: 2024-04-17

## 2024-04-17 RX ORDER — SODIUM CHLORIDE, SODIUM LACTATE, POTASSIUM CHLORIDE, CALCIUM CHLORIDE 600; 310; 30; 20 MG/100ML; MG/100ML; MG/100ML; MG/100ML
100 INJECTION, SOLUTION INTRAVENOUS CONTINUOUS
Status: DISCONTINUED | OUTPATIENT
Start: 2024-04-17 | End: 2024-04-17 | Stop reason: HOSPADM

## 2024-04-17 RX ORDER — GENTAMICIN 40 MG/ML
INJECTION, SOLUTION INTRAMUSCULAR; INTRAVENOUS AS NEEDED
Status: DISCONTINUED | OUTPATIENT
Start: 2024-04-17 | End: 2024-04-17

## 2024-04-17 RX ORDER — CEFAZOLIN SODIUM 2 G/100ML
2 INJECTION, SOLUTION INTRAVENOUS EVERY 8 HOURS
Qty: 200 ML | Refills: 0 | Status: COMPLETED | OUTPATIENT
Start: 2024-04-17 | End: 2024-04-18

## 2024-04-17 RX ORDER — LIDOCAINE HYDROCHLORIDE 20 MG/ML
INJECTION, SOLUTION EPIDURAL; INFILTRATION; INTRACAUDAL; PERINEURAL AS NEEDED
Status: DISCONTINUED | OUTPATIENT
Start: 2024-04-17 | End: 2024-04-17

## 2024-04-17 RX ORDER — FENTANYL CITRATE 50 UG/ML
INJECTION, SOLUTION INTRAMUSCULAR; INTRAVENOUS CONTINUOUS PRN
Status: DISCONTINUED | OUTPATIENT
Start: 2024-04-17 | End: 2024-04-17

## 2024-04-17 RX ORDER — LITHIUM CARBONATE 300 MG/1
300 CAPSULE ORAL 2 TIMES DAILY
Status: DISCONTINUED | OUTPATIENT
Start: 2024-04-17 | End: 2024-04-18 | Stop reason: HOSPADM

## 2024-04-17 RX ORDER — ATORVASTATIN CALCIUM 40 MG/1
40 TABLET, FILM COATED ORAL NIGHTLY
Status: DISCONTINUED | OUTPATIENT
Start: 2024-04-17 | End: 2024-04-18 | Stop reason: HOSPADM

## 2024-04-17 RX ORDER — NORETHINDRONE AND ETHINYL ESTRADIOL 0.5-0.035
KIT ORAL AS NEEDED
Status: DISCONTINUED | OUTPATIENT
Start: 2024-04-17 | End: 2024-04-17

## 2024-04-17 RX ORDER — NALOXONE HYDROCHLORIDE 0.4 MG/ML
0.2 INJECTION, SOLUTION INTRAMUSCULAR; INTRAVENOUS; SUBCUTANEOUS EVERY 5 MIN PRN
Status: DISCONTINUED | OUTPATIENT
Start: 2024-04-17 | End: 2024-04-18 | Stop reason: HOSPADM

## 2024-04-17 RX ORDER — PROCHLORPERAZINE EDISYLATE 5 MG/ML
10 INJECTION INTRAMUSCULAR; INTRAVENOUS EVERY 6 HOURS PRN
Status: DISCONTINUED | OUTPATIENT
Start: 2024-04-17 | End: 2024-04-18 | Stop reason: HOSPADM

## 2024-04-17 RX ORDER — OXYCODONE HYDROCHLORIDE 5 MG/1
10 TABLET ORAL EVERY 4 HOURS PRN
Status: DISCONTINUED | OUTPATIENT
Start: 2024-04-17 | End: 2024-04-18 | Stop reason: HOSPADM

## 2024-04-17 RX ORDER — FLUOXETINE HYDROCHLORIDE 20 MG/1
60 CAPSULE ORAL
Status: DISCONTINUED | OUTPATIENT
Start: 2024-04-17 | End: 2024-04-18 | Stop reason: HOSPADM

## 2024-04-17 RX ORDER — GLYCOPYRROLATE 0.2 MG/ML
INJECTION INTRAMUSCULAR; INTRAVENOUS AS NEEDED
Status: DISCONTINUED | OUTPATIENT
Start: 2024-04-17 | End: 2024-04-17

## 2024-04-17 RX ORDER — DILTIAZEM HYDROCHLORIDE 120 MG/1
240 CAPSULE, COATED, EXTENDED RELEASE ORAL
Status: DISCONTINUED | OUTPATIENT
Start: 2024-04-17 | End: 2024-04-18 | Stop reason: HOSPADM

## 2024-04-17 RX ORDER — LIDOCAINE HYDROCHLORIDE 10 MG/ML
0.1 INJECTION, SOLUTION EPIDURAL; INFILTRATION; INTRACAUDAL; PERINEURAL ONCE
Status: DISCONTINUED | OUTPATIENT
Start: 2024-04-17 | End: 2024-04-17 | Stop reason: HOSPADM

## 2024-04-17 RX ORDER — SODIUM CHLORIDE 9 MG/ML
100 INJECTION, SOLUTION INTRAVENOUS CONTINUOUS
Status: DISCONTINUED | OUTPATIENT
Start: 2024-04-17 | End: 2024-04-18 | Stop reason: HOSPADM

## 2024-04-17 RX ORDER — KETOROLAC TROMETHAMINE 30 MG/ML
15 INJECTION, SOLUTION INTRAMUSCULAR; INTRAVENOUS EVERY 6 HOURS PRN
Status: DISCONTINUED | OUTPATIENT
Start: 2024-04-17 | End: 2024-04-18 | Stop reason: HOSPADM

## 2024-04-17 RX ORDER — MAGNESIUM SULFATE HEPTAHYDRATE 500 MG/ML
INJECTION, SOLUTION INTRAMUSCULAR; INTRAVENOUS AS NEEDED
Status: DISCONTINUED | OUTPATIENT
Start: 2024-04-17 | End: 2024-04-17

## 2024-04-17 RX ORDER — PROCHLORPERAZINE MALEATE 10 MG
10 TABLET ORAL EVERY 6 HOURS PRN
Status: DISCONTINUED | OUTPATIENT
Start: 2024-04-17 | End: 2024-04-18 | Stop reason: HOSPADM

## 2024-04-17 RX ORDER — ACETAMINOPHEN 325 MG/1
650 TABLET ORAL EVERY 4 HOURS PRN
Status: DISCONTINUED | OUTPATIENT
Start: 2024-04-17 | End: 2024-04-17 | Stop reason: HOSPADM

## 2024-04-17 RX ORDER — BUPIVACAINE HCL/EPINEPHRINE 0.5-1:200K
VIAL (ML) INJECTION AS NEEDED
Status: DISCONTINUED | OUTPATIENT
Start: 2024-04-17 | End: 2024-04-17 | Stop reason: HOSPADM

## 2024-04-17 RX ORDER — METOPROLOL SUCCINATE 25 MG/1
25 TABLET, EXTENDED RELEASE ORAL DAILY
Status: DISCONTINUED | OUTPATIENT
Start: 2024-04-17 | End: 2024-04-18 | Stop reason: HOSPADM

## 2024-04-17 RX ORDER — NEOSTIGMINE METHYLSULFATE 0.5 MG/ML
INJECTION, SOLUTION INTRAVENOUS AS NEEDED
Status: DISCONTINUED | OUTPATIENT
Start: 2024-04-17 | End: 2024-04-17

## 2024-04-17 RX ORDER — METHOCARBAMOL 100 MG/ML
INJECTION, SOLUTION INTRAMUSCULAR; INTRAVENOUS AS NEEDED
Status: DISCONTINUED | OUTPATIENT
Start: 2024-04-17 | End: 2024-04-17

## 2024-04-17 RX ORDER — SODIUM CHLORIDE 0.9 G/100ML
IRRIGANT IRRIGATION AS NEEDED
Status: DISCONTINUED | OUTPATIENT
Start: 2024-04-17 | End: 2024-04-17 | Stop reason: HOSPADM

## 2024-04-17 RX ORDER — FORMOTEROL FUMARATE DIHYDRATE 20 UG/2ML
20 SOLUTION RESPIRATORY (INHALATION)
Status: DISCONTINUED | OUTPATIENT
Start: 2024-04-17 | End: 2024-04-18 | Stop reason: HOSPADM

## 2024-04-17 RX ORDER — OLANZAPINE 2.5 MG/1
7.5 TABLET ORAL NIGHTLY
Status: DISCONTINUED | OUTPATIENT
Start: 2024-04-17 | End: 2024-04-18 | Stop reason: HOSPADM

## 2024-04-17 RX ORDER — ROCURONIUM BROMIDE 10 MG/ML
INJECTION, SOLUTION INTRAVENOUS AS NEEDED
Status: DISCONTINUED | OUTPATIENT
Start: 2024-04-17 | End: 2024-04-17

## 2024-04-17 RX ORDER — OXYCODONE HYDROCHLORIDE 5 MG/1
5 TABLET ORAL EVERY 4 HOURS PRN
Status: DISCONTINUED | OUTPATIENT
Start: 2024-04-17 | End: 2024-04-17 | Stop reason: HOSPADM

## 2024-04-17 RX ORDER — ACETAMINOPHEN 325 MG/1
650 TABLET ORAL EVERY 6 HOURS
Status: DISCONTINUED | OUTPATIENT
Start: 2024-04-17 | End: 2024-04-18 | Stop reason: HOSPADM

## 2024-04-17 RX ORDER — ONDANSETRON HYDROCHLORIDE 2 MG/ML
4 INJECTION, SOLUTION INTRAVENOUS EVERY 8 HOURS PRN
Status: DISCONTINUED | OUTPATIENT
Start: 2024-04-17 | End: 2024-04-18 | Stop reason: HOSPADM

## 2024-04-17 RX ORDER — CEFAZOLIN 1 G/1
INJECTION, POWDER, FOR SOLUTION INTRAVENOUS AS NEEDED
Status: DISCONTINUED | OUTPATIENT
Start: 2024-04-17 | End: 2024-04-17

## 2024-04-17 RX ORDER — DIPHENHYDRAMINE HYDROCHLORIDE 50 MG/ML
12.5 INJECTION INTRAMUSCULAR; INTRAVENOUS EVERY 6 HOURS PRN
Status: DISCONTINUED | OUTPATIENT
Start: 2024-04-17 | End: 2024-04-18 | Stop reason: HOSPADM

## 2024-04-17 RX ORDER — PROPOFOL 10 MG/ML
INJECTION, EMULSION INTRAVENOUS AS NEEDED
Status: DISCONTINUED | OUTPATIENT
Start: 2024-04-17 | End: 2024-04-17

## 2024-04-17 RX ORDER — OXYCODONE HYDROCHLORIDE 5 MG/1
5 TABLET ORAL EVERY 4 HOURS PRN
Status: DISCONTINUED | OUTPATIENT
Start: 2024-04-17 | End: 2024-04-18 | Stop reason: HOSPADM

## 2024-04-17 RX ORDER — ALBUTEROL SULFATE 90 UG/1
2 AEROSOL, METERED RESPIRATORY (INHALATION) EVERY 6 HOURS PRN
Status: DISCONTINUED | OUTPATIENT
Start: 2024-04-17 | End: 2024-04-17 | Stop reason: CLARIF

## 2024-04-17 RX ORDER — IPRATROPIUM BROMIDE AND ALBUTEROL SULFATE 2.5; .5 MG/3ML; MG/3ML
3 SOLUTION RESPIRATORY (INHALATION) EVERY 6 HOURS PRN
Status: DISCONTINUED | OUTPATIENT
Start: 2024-04-17 | End: 2024-04-18 | Stop reason: HOSPADM

## 2024-04-17 RX ORDER — ALBUTEROL SULFATE 0.83 MG/ML
2.5 SOLUTION RESPIRATORY (INHALATION) EVERY 6 HOURS PRN
Status: DISCONTINUED | OUTPATIENT
Start: 2024-04-17 | End: 2024-04-18 | Stop reason: HOSPADM

## 2024-04-17 RX ORDER — METHOCARBAMOL 500 MG/1
500 TABLET, FILM COATED ORAL 4 TIMES DAILY
Status: DISCONTINUED | OUTPATIENT
Start: 2024-04-17 | End: 2024-04-18 | Stop reason: HOSPADM

## 2024-04-17 RX ORDER — ONDANSETRON 4 MG/1
4 TABLET, FILM COATED ORAL EVERY 8 HOURS PRN
Status: DISCONTINUED | OUTPATIENT
Start: 2024-04-17 | End: 2024-04-18 | Stop reason: HOSPADM

## 2024-04-17 RX ADMIN — CEFAZOLIN 2 G: 330 INJECTION, POWDER, FOR SOLUTION INTRAMUSCULAR; INTRAVENOUS at 07:48

## 2024-04-17 RX ADMIN — Medication 200 MCG: at 08:36

## 2024-04-17 RX ADMIN — DOCUSATE SODIUM 100 MG: 100 CAPSULE, LIQUID FILLED ORAL at 20:57

## 2024-04-17 RX ADMIN — HYDROMORPHONE HYDROCHLORIDE 0.5 MG: 1 INJECTION, SOLUTION INTRAMUSCULAR; INTRAVENOUS; SUBCUTANEOUS at 11:16

## 2024-04-17 RX ADMIN — MIDAZOLAM HYDROCHLORIDE 2 MG: 1 INJECTION, SOLUTION INTRAMUSCULAR; INTRAVENOUS at 07:42

## 2024-04-17 RX ADMIN — ACETAMINOPHEN 650 MG: 325 TABLET ORAL at 12:41

## 2024-04-17 RX ADMIN — CEFAZOLIN SODIUM 2 G: 2 INJECTION, SOLUTION INTRAVENOUS at 17:01

## 2024-04-17 RX ADMIN — DEXAMETHASONE SODIUM PHOSPHATE 10 MG: 4 INJECTION, SOLUTION INTRA-ARTICULAR; INTRALESIONAL; INTRAMUSCULAR; INTRAVENOUS; SOFT TISSUE at 21:49

## 2024-04-17 RX ADMIN — Medication 25 MG: at 08:44

## 2024-04-17 RX ADMIN — SODIUM CHLORIDE, SODIUM LACTATE, POTASSIUM CHLORIDE, AND CALCIUM CHLORIDE: 600; 310; 30; 20 INJECTION, SOLUTION INTRAVENOUS at 07:34

## 2024-04-17 RX ADMIN — Medication 50 MG: at 07:42

## 2024-04-17 RX ADMIN — GLYCOPYRROLATE 0.2 MG: 0.2 INJECTION, SOLUTION INTRAMUSCULAR; INTRAVENOUS at 08:20

## 2024-04-17 RX ADMIN — DEXAMETHASONE SODIUM PHOSPHATE 4 MG: 4 INJECTION, SOLUTION INTRA-ARTICULAR; INTRALESIONAL; INTRAMUSCULAR; INTRAVENOUS; SOFT TISSUE at 07:48

## 2024-04-17 RX ADMIN — Medication 200 MCG: at 08:44

## 2024-04-17 RX ADMIN — LITHIUM CARBONATE 300 MG: 300 CAPSULE, GELATIN COATED ORAL at 20:57

## 2024-04-17 RX ADMIN — Medication 3 L/MIN: at 11:13

## 2024-04-17 RX ADMIN — ROCURONIUM BROMIDE 80 MG: 10 INJECTION, SOLUTION INTRAVENOUS at 07:42

## 2024-04-17 RX ADMIN — GLYCOPYRROLATE 0.6 MG: 0.2 INJECTION, SOLUTION INTRAMUSCULAR; INTRAVENOUS at 10:18

## 2024-04-17 RX ADMIN — METHOCARBAMOL 500 MG: 500 TABLET ORAL at 20:57

## 2024-04-17 RX ADMIN — DOCUSATE SODIUM 100 MG: 100 CAPSULE, LIQUID FILLED ORAL at 12:42

## 2024-04-17 RX ADMIN — MAGNESIUM SULFATE HEPTAHYDRATE 2 G: 500 INJECTION, SOLUTION INTRAMUSCULAR; INTRAVENOUS at 07:48

## 2024-04-17 RX ADMIN — EPHEDRINE SULFATE 10 MG: 50 INJECTION, SOLUTION INTRAVENOUS at 08:20

## 2024-04-17 RX ADMIN — DEXAMETHASONE SODIUM PHOSPHATE 10 MG: 4 INJECTION, SOLUTION INTRA-ARTICULAR; INTRALESIONAL; INTRAMUSCULAR; INTRAVENOUS; SOFT TISSUE at 16:53

## 2024-04-17 RX ADMIN — LIDOCAINE HYDROCHLORIDE 100 MG: 20 INJECTION, SOLUTION EPIDURAL; INFILTRATION; INTRACAUDAL; PERINEURAL at 07:42

## 2024-04-17 RX ADMIN — SODIUM CHLORIDE 100 ML/HR: 9 INJECTION, SOLUTION INTRAVENOUS at 12:46

## 2024-04-17 RX ADMIN — OXYCODONE HYDROCHLORIDE 10 MG: 5 TABLET ORAL at 21:49

## 2024-04-17 RX ADMIN — AMANTADINE HYDROCHLORIDE 100 MG: 100 CAPSULE ORAL at 20:57

## 2024-04-17 RX ADMIN — OXYCODONE HYDROCHLORIDE 10 MG: 5 TABLET ORAL at 17:12

## 2024-04-17 RX ADMIN — HYDROMORPHONE HYDROCHLORIDE 0.2 MG: 0.2 INJECTION, SOLUTION INTRAMUSCULAR; INTRAVENOUS; SUBCUTANEOUS at 12:37

## 2024-04-17 RX ADMIN — HYDROMORPHONE HYDROCHLORIDE 0.5 MG: 1 INJECTION, SOLUTION INTRAMUSCULAR; INTRAVENOUS; SUBCUTANEOUS at 11:05

## 2024-04-17 RX ADMIN — GENTAMICIN SULFATE 80 MG: 40 INJECTION, SOLUTION INTRAMUSCULAR; INTRAVENOUS at 07:48

## 2024-04-17 RX ADMIN — METHOCARBAMOL 500 MG: 500 TABLET ORAL at 12:41

## 2024-04-17 RX ADMIN — TAMSULOSIN HYDROCHLORIDE 0.4 MG: 0.4 CAPSULE ORAL at 16:57

## 2024-04-17 RX ADMIN — EPHEDRINE SULFATE 25 MG: 50 INJECTION, SOLUTION INTRAVENOUS at 08:19

## 2024-04-17 RX ADMIN — Medication 10 L/MIN: at 10:26

## 2024-04-17 RX ADMIN — ATORVASTATIN CALCIUM 40 MG: 40 TABLET, FILM COATED ORAL at 20:57

## 2024-04-17 RX ADMIN — METHOCARBAMOL 500 MG: 500 TABLET ORAL at 17:01

## 2024-04-17 RX ADMIN — NORETHINDRONE AND ETHINYL ESTRADIOL 5 MG: KIT ORAL at 08:23

## 2024-04-17 RX ADMIN — Medication 25 MG: at 09:37

## 2024-04-17 RX ADMIN — METHOCARBAMOL 1000 MG: 100 INJECTION INTRAMUSCULAR; INTRAVENOUS at 10:24

## 2024-04-17 RX ADMIN — ONDANSETRON 4 MG: 2 INJECTION INTRAMUSCULAR; INTRAVENOUS at 10:18

## 2024-04-17 RX ADMIN — LITHIUM CARBONATE 300 MG: 300 CAPSULE, GELATIN COATED ORAL at 16:57

## 2024-04-17 RX ADMIN — OLANZAPINE 7.5 MG: 2.5 TABLET, FILM COATED ORAL at 20:57

## 2024-04-17 RX ADMIN — NEOSTIGMINE METHYLSULFATE 3 MG: 0.5 INJECTION, SOLUTION INTRAVENOUS at 10:18

## 2024-04-17 RX ADMIN — NORETHINDRONE AND ETHINYL ESTRADIOL 10 MG: KIT ORAL at 08:20

## 2024-04-17 RX ADMIN — CEFAZOLIN SODIUM 2 G: 2 INJECTION, SOLUTION INTRAVENOUS at 23:53

## 2024-04-17 RX ADMIN — FLUOXETINE HYDROCHLORIDE 60 MG: 20 CAPSULE ORAL at 12:42

## 2024-04-17 RX ADMIN — PROPOFOL 200 MG: 10 INJECTION, EMULSION INTRAVENOUS at 07:42

## 2024-04-17 RX ADMIN — AMANTADINE HYDROCHLORIDE 100 MG: 100 CAPSULE ORAL at 12:42

## 2024-04-17 RX ADMIN — FORMOTEROL FUMARATE DIHYDRATE 20 MCG: 20 SOLUTION RESPIRATORY (INHALATION) at 19:25

## 2024-04-17 RX ADMIN — ACETAMINOPHEN 650 MG: 325 TABLET ORAL at 23:53

## 2024-04-17 SDOH — SOCIAL STABILITY: SOCIAL INSECURITY: HAS ANYONE EVER THREATENED TO HURT YOUR FAMILY OR YOUR PETS?: NO

## 2024-04-17 SDOH — HEALTH STABILITY: MENTAL HEALTH: HOW OFTEN DO YOU HAVE 6 OR MORE DRINKS ON ONE OCCASION?: NEVER

## 2024-04-17 SDOH — SOCIAL STABILITY: SOCIAL INSECURITY: DOES ANYONE TRY TO KEEP YOU FROM HAVING/CONTACTING OTHER FRIENDS OR DOING THINGS OUTSIDE YOUR HOME?: NO

## 2024-04-17 SDOH — HEALTH STABILITY: MENTAL HEALTH: HOW OFTEN DO YOU HAVE A DRINK CONTAINING ALCOHOL?: NEVER

## 2024-04-17 SDOH — ECONOMIC STABILITY: INCOME INSECURITY: IN THE PAST 12 MONTHS, HAS THE ELECTRIC, GAS, OIL, OR WATER COMPANY THREATENED TO SHUT OFF SERVICE IN YOUR HOME?: NO

## 2024-04-17 SDOH — SOCIAL STABILITY: SOCIAL INSECURITY: DO YOU FEEL UNSAFE GOING BACK TO THE PLACE WHERE YOU ARE LIVING?: NO

## 2024-04-17 SDOH — ECONOMIC STABILITY: HOUSING INSECURITY
IN THE LAST 12 MONTHS, WAS THERE A TIME WHEN YOU DID NOT HAVE A STEADY PLACE TO SLEEP OR SLEPT IN A SHELTER (INCLUDING NOW)?: NO

## 2024-04-17 SDOH — ECONOMIC STABILITY: INCOME INSECURITY: IN THE LAST 12 MONTHS, WAS THERE A TIME WHEN YOU WERE NOT ABLE TO PAY THE MORTGAGE OR RENT ON TIME?: NO

## 2024-04-17 SDOH — ECONOMIC STABILITY: FOOD INSECURITY: WITHIN THE PAST 12 MONTHS, THE FOOD YOU BOUGHT JUST DIDN'T LAST AND YOU DIDN'T HAVE MONEY TO GET MORE.: NEVER TRUE

## 2024-04-17 SDOH — SOCIAL STABILITY: SOCIAL INSECURITY: HAVE YOU HAD THOUGHTS OF HARMING ANYONE ELSE?: NO

## 2024-04-17 SDOH — ECONOMIC STABILITY: FOOD INSECURITY: WITHIN THE PAST 12 MONTHS, YOU WORRIED THAT YOUR FOOD WOULD RUN OUT BEFORE YOU GOT MONEY TO BUY MORE.: NEVER TRUE

## 2024-04-17 SDOH — ECONOMIC STABILITY: INCOME INSECURITY: HOW HARD IS IT FOR YOU TO PAY FOR THE VERY BASICS LIKE FOOD, HOUSING, MEDICAL CARE, AND HEATING?: NOT VERY HARD

## 2024-04-17 SDOH — ECONOMIC STABILITY: TRANSPORTATION INSECURITY
IN THE PAST 12 MONTHS, HAS LACK OF TRANSPORTATION KEPT YOU FROM MEETINGS, WORK, OR FROM GETTING THINGS NEEDED FOR DAILY LIVING?: NO

## 2024-04-17 SDOH — SOCIAL STABILITY: SOCIAL INSECURITY: WERE YOU ABLE TO COMPLETE ALL THE BEHAVIORAL HEALTH SCREENINGS?: YES

## 2024-04-17 SDOH — SOCIAL STABILITY: SOCIAL INSECURITY: ARE THERE ANY APPARENT SIGNS OF INJURIES/BEHAVIORS THAT COULD BE RELATED TO ABUSE/NEGLECT?: NO

## 2024-04-17 SDOH — ECONOMIC STABILITY: HOUSING INSECURITY: IN THE LAST 12 MONTHS, HOW MANY PLACES HAVE YOU LIVED?: 1

## 2024-04-17 SDOH — HEALTH STABILITY: MENTAL HEALTH: HOW MANY STANDARD DRINKS CONTAINING ALCOHOL DO YOU HAVE ON A TYPICAL DAY?: PATIENT DOES NOT DRINK

## 2024-04-17 SDOH — SOCIAL STABILITY: SOCIAL INSECURITY: ABUSE: ADULT

## 2024-04-17 SDOH — SOCIAL STABILITY: SOCIAL INSECURITY: ARE YOU OR HAVE YOU BEEN THREATENED OR ABUSED PHYSICALLY, EMOTIONALLY, OR SEXUALLY BY ANYONE?: NO

## 2024-04-17 SDOH — SOCIAL STABILITY: SOCIAL INSECURITY: HAVE YOU HAD ANY THOUGHTS OF HARMING ANYONE ELSE?: NO

## 2024-04-17 SDOH — SOCIAL STABILITY: SOCIAL INSECURITY: DO YOU FEEL ANYONE HAS EXPLOITED OR TAKEN ADVANTAGE OF YOU FINANCIALLY OR OF YOUR PERSONAL PROPERTY?: NO

## 2024-04-17 SDOH — HEALTH STABILITY: MENTAL HEALTH: CURRENT SMOKER: 0

## 2024-04-17 SDOH — ECONOMIC STABILITY: TRANSPORTATION INSECURITY
IN THE PAST 12 MONTHS, HAS THE LACK OF TRANSPORTATION KEPT YOU FROM MEDICAL APPOINTMENTS OR FROM GETTING MEDICATIONS?: NO

## 2024-04-17 ASSESSMENT — PAIN - FUNCTIONAL ASSESSMENT
PAIN_FUNCTIONAL_ASSESSMENT: 0-10

## 2024-04-17 ASSESSMENT — LIFESTYLE VARIABLES
HOW OFTEN DO YOU HAVE 6 OR MORE DRINKS ON ONE OCCASION: NEVER
HOW MANY STANDARD DRINKS CONTAINING ALCOHOL DO YOU HAVE ON A TYPICAL DAY: PATIENT DOES NOT DRINK
SKIP TO QUESTIONS 9-10: 1
HOW OFTEN DO YOU HAVE A DRINK CONTAINING ALCOHOL: NEVER
AUDIT-C TOTAL SCORE: 0
AUDIT-C TOTAL SCORE: 0
SKIP TO QUESTIONS 9-10: 1
AUDIT-C TOTAL SCORE: 0

## 2024-04-17 ASSESSMENT — COGNITIVE AND FUNCTIONAL STATUS - GENERAL
MOBILITY SCORE: 17
DAILY ACTIVITIY SCORE: 21
CLIMB 3 TO 5 STEPS WITH RAILING: A LOT
TURNING FROM BACK TO SIDE WHILE IN FLAT BAD: A LITTLE
TURNING FROM BACK TO SIDE WHILE IN FLAT BAD: A LITTLE
MOVING TO AND FROM BED TO CHAIR: A LITTLE
DRESSING REGULAR LOWER BODY CLOTHING: A LITTLE
STANDING UP FROM CHAIR USING ARMS: A LITTLE
WALKING IN HOSPITAL ROOM: A LITTLE
WALKING IN HOSPITAL ROOM: A LITTLE
MOVING FROM LYING ON BACK TO SITTING ON SIDE OF FLAT BED WITH BEDRAILS: A LITTLE
CLIMB 3 TO 5 STEPS WITH RAILING: A LOT
HELP NEEDED FOR BATHING: A LITTLE
MOVING FROM LYING ON BACK TO SITTING ON SIDE OF FLAT BED WITH BEDRAILS: A LITTLE
STANDING UP FROM CHAIR USING ARMS: A LITTLE
TOILETING: A LITTLE
MOVING TO AND FROM BED TO CHAIR: A LITTLE
MOBILITY SCORE: 17
PATIENT BASELINE BEDBOUND: NO

## 2024-04-17 ASSESSMENT — ACTIVITIES OF DAILY LIVING (ADL)
GROOMING: INDEPENDENT
DRESSING YOURSELF: NEEDS ASSISTANCE
HEARING - LEFT EAR: FUNCTIONAL
BATHING: NEEDS ASSISTANCE
JUDGMENT_ADEQUATE_SAFELY_COMPLETE_DAILY_ACTIVITIES: YES
ASSISTIVE_DEVICE: WALKER
TOILETING: NEEDS ASSISTANCE
LACK_OF_TRANSPORTATION: NO
FEEDING YOURSELF: INDEPENDENT
HEARING - RIGHT EAR: FUNCTIONAL
WALKS IN HOME: NEEDS ASSISTANCE
PATIENT'S MEMORY ADEQUATE TO SAFELY COMPLETE DAILY ACTIVITIES?: YES
ADL_ASSISTANCE: INDEPENDENT
ADEQUATE_TO_COMPLETE_ADL: YES

## 2024-04-17 ASSESSMENT — PAIN DESCRIPTION - DESCRIPTORS
DESCRIPTORS: ACHING

## 2024-04-17 ASSESSMENT — PAIN SCALES - GENERAL
PAINLEVEL_OUTOF10: 10 - WORST POSSIBLE PAIN
PAINLEVEL_OUTOF10: 8
PAINLEVEL_OUTOF10: 10 - WORST POSSIBLE PAIN
PAINLEVEL_OUTOF10: 8
PAINLEVEL_OUTOF10: 10 - WORST POSSIBLE PAIN
PAINLEVEL_OUTOF10: 0 - NO PAIN
PAINLEVEL_OUTOF10: 8
PAINLEVEL_OUTOF10: 10 - WORST POSSIBLE PAIN
PAINLEVEL_OUTOF10: 0 - NO PAIN
PAINLEVEL_OUTOF10: 6
PAINLEVEL_OUTOF10: 0 - NO PAIN
PAINLEVEL_OUTOF10: 7
PAINLEVEL_OUTOF10: 6

## 2024-04-17 ASSESSMENT — COLUMBIA-SUICIDE SEVERITY RATING SCALE - C-SSRS
1. IN THE PAST MONTH, HAVE YOU WISHED YOU WERE DEAD OR WISHED YOU COULD GO TO SLEEP AND NOT WAKE UP?: NO
6. HAVE YOU EVER DONE ANYTHING, STARTED TO DO ANYTHING, OR PREPARED TO DO ANYTHING TO END YOUR LIFE?: NO
2. HAVE YOU ACTUALLY HAD ANY THOUGHTS OF KILLING YOURSELF?: NO

## 2024-04-17 ASSESSMENT — PATIENT HEALTH QUESTIONNAIRE - PHQ9
2. FEELING DOWN, DEPRESSED OR HOPELESS: NOT AT ALL
1. LITTLE INTEREST OR PLEASURE IN DOING THINGS: NOT AT ALL
SUM OF ALL RESPONSES TO PHQ9 QUESTIONS 1 & 2: 0

## 2024-04-17 NOTE — ANESTHESIA PREPROCEDURE EVALUATION
Patient: Trip Brown    Procedure Information       Date/Time: 04/17/24 6630    Procedures:       L5-S1 Transabdominal Approach Anterior Lumbar Fusion; L5-S1 Posterior Instrumentation (Spine Lumbar)      Spinal Exposure (Spine Lumbar)    Location: [facility] A OR 07 / [facility] A OR    Surgeons: Marvin Herr MD; Marvin Kwan DO            Relevant Problems   Anesthesia   (+) Dental caries      Cardiac   (+) Angina pectoris (CMS-HCC)   (+) SVT (supraventricular tachycardia) (CMS-HCC)      Pulmonary   (+) Asthma ([facility]-HCC)   (+) Asthma with COPD with exacerbation (Multi)   (+) Chronic obstructive pulmonary disease (Multi)   (+) PARRA (dyspnea on exertion)   (+) Mixed simple and mucopurulent chronic bronchitis (Multi)      Neuro   (+) Anxiety   (+) Bipolar 2 disorder, major depressive episode (Multi)   (+) Bipolar I disorder (Multi)   (+) Bipolar affective disorder, currently depressed, moderate (Multi)   (+) Chronic lumbar radiculopathy   (+) Lumbar radiculopathy   (+) Mild depressive disorder   (+) Opioid abuse (Multi)   (+) Sciatic pain   (+) Severe episode of recurrent major depressive disorder, without psychotic features (Multi)      Liver   (+) Chronic hepatitis C without hepatic coma (Multi)      Hematology   (+) Thrombocytopenia (CMS-HCC)      Musculoskeletal   (+) Acquired scoliosis   (+) Localized, primary osteoarthritis   (+) Post-traumatic osteoarthritis of left knee   (+) Primary osteoarthritis of both knees      HEENT   (+) Chronic maxillary sinusitis   (+) Sinusitis      ID   (+) Chronic hepatitis C without hepatic coma (Multi)   (+) Dental caries   (+) Mixed simple and mucopurulent chronic bronchitis (Multi)      Skin   (+) Rash and nonspecific skin eruption       Clinical information reviewed:   Tobacco  Allergies  Meds   Med Hx  Surg Hx   Fam Hx  Soc Hx         Past Medical History:   Diagnosis Date    Asthma ([facility]-HCC)     Bipolar disorder (Multi)     CKD (chronic kidney disease)     COPD  "(chronic obstructive pulmonary disease) (Multi)     Polysubstance abuse (Multi)     SVT (supraventricular tachycardia) (CMS-HCC) 2023    s/p ablation    Thrombocytopenia (CMS-HCC)       Past Surgical History:   Procedure Laterality Date    HAND SURGERY Left     LUMBAR SPINE SURGERY       Social History     Tobacco Use    Smoking status: Former     Current packs/day: 0.00     Average packs/day: 0.5 packs/day for 40.0 years (20.0 ttl pk-yrs)     Types: Cigarettes     Start date: 1984     Quit date: 2024     Years since quittin.1    Smokeless tobacco: Never   Vaping Use    Vaping status: Never Used   Substance Use Topics    Drug use: Yes     Types: \"Crack\" cocaine, Cocaine, Heroin     Comment: last used       Current Outpatient Medications   Medication Instructions    acetaminophen (TYLENOL 8 HOUR) 650 mg, oral, Every 8 hours PRN, Do not crush, chew, or split.    amantadine (Symmetrel) 100 mg capsule Take 1 capsule (100 mg) by mouth 2 times a day.    aspirin 81 mg, oral, Daily RT    atorvastatin (LIPITOR) 40 mg, oral, Daily RT    baclofen (LIORESAL) 10 mg, oral, Daily PRN    chlorhexidine (Peridex) 0.12 % solution Swish for 30 seconds and spit 15mL of solution the night before and morning of surgery    cholecalciferol (VITAMIN D-3) 25 mcg, oral, Daily RT    clindamycin (Cleocin T) 1 % external solution Topical, 2 times daily    diclofenac sodium (Voltaren) 1 % gel gel Apply 4.5 inches (1 Application) topically 2 times a day as needed.    dilTIAZem CD (CARDIZEM CD) 240 mg, oral, Daily RT    FLUoxetine (PROzac) 60 mg tablet 1 capsule, oral, Daily RT    fluticasone (Flonase) 50 mcg/actuation nasal spray 1 spray, nasal, Daily RT    hydrOXYzine HCL (ATARAX) 25 mg, oral, 2 times daily PRN    ipratropium-albuteroL (Duo-Neb) 0.5-2.5 mg/3 mL nebulizer solution 3 mL, inhalation, Every 6 hours PRN    lidocaine (Lidoderm) 5 % patch 1 patch, transdermal    lithium 300 mg, oral, 2 times daily    meloxicam " "(MOBIC) 15 mg, oral, Daily RT    methylPREDNISolone (Medrol Dospak) 4 mg tablets Use as directed    metoprolol succinate XL (TOPROL-XL) 25 mg, oral, Daily RT    naproxen sodium (Anaprox) 550 mg tablet Take 1 tablet (550 mg) by mouth 3 times a day with meals.    nicotine (Nicoderm CQ) 21 mg/24 hr patch 1 patch, transdermal, Every 24 hours    nicotine polacrilex (COMMIT) 4 mg, Mouth/Throat, Every 2 hour PRN    OLANZapine (ZyPREXA) 7.5 mg tablet Take 1 Tablet by mouth nightly at bedtime; Indications manic-depression    pantoprazole (ProtoNix) 40 mg EC tablet Take 1 tablet (40 mg) by mouth once daily in the morning. Take before meals.    tamsulosin (FLOMAX) 0.4 mg, oral, Daily    tiotropium (SPIRIVA) 18 mcg, inhalation, Daily RT    tiotropium-olodateroL (Stiolto Respimat) 2.5-2.5 mcg/actuation mist inhaler 2 Inhalations, inhalation, Daily    traZODone (DESYREL) 50 mg, oral    triamcinolone (Kenalog) 0.1 % cream Apply to affected area twice daily Monday-Friday. Take weekends off. Do not use on face, armpits, neck, or groin.    Ventolin HFA 90 mcg/actuation inhaler Inhale 2 puffs every 6 hours if needed for wheezing or shortness of breath.      Allergies   Allergen Reactions    Bupropion Hallucinations        Chemistry    Lab Results   Component Value Date/Time     04/02/2024 1451    K 4.3 04/02/2024 1451     (H) 04/02/2024 1451    CO2 22 04/02/2024 1451    BUN 16 04/02/2024 1451    CREATININE 1.44 (H) 04/02/2024 1451    Lab Results   Component Value Date/Time    CALCIUM 9.4 04/02/2024 1451          Lab Results   Component Value Date    HGBA1C 4.9 04/02/2024     Lab Results   Component Value Date/Time    WBC 7.2 04/02/2024 1451    HGB 16.8 04/02/2024 1451    HCT 49.9 04/02/2024 1451     04/02/2024 1451     Lab Results   Component Value Date/Time    PROTIME 11.7 04/02/2024 1451    INR 1.0 04/02/2024 1451     No results found for: \"ABORH\"  Encounter Date: 04/02/24   ECG 12 lead (Clinic Performed)   Result " Value    Ventricular Rate 63    Atrial Rate 63    DC Interval 174    QRS Duration 80    QT Interval 388    QTC Calculation(Bazett) 397    P Axis 61    R Axis 4    T Axis 65    QRS Count 11    Q Onset 226    P Onset 139    P Offset 188    T Offset 420    QTC Fredericia 394    Narrative    Sinus rhythm with occasional Premature ventricular complexes  Anteroseptal infarct (cited on or before 31-JAN-2024)  Abnormal ECG  When compared with ECG of 31-JAN-2024 10:44,  Premature ventricular complexes are now Present  Confirmed by Van Pace ([id number]) on 4/3/2024 8:58:26 AM     No results found for this or any previous visit from the past 1095 days.       Visit Vitals  BP (!) 139/92   Pulse 54   Temp 36.2 °C (97.2 °F)   Resp 16   Ht 1.829 m (6')   Wt 87.2 kg (192 lb 3.9 oz)   SpO2 94%   BMI 26.07 kg/m²   Smoking Status Former   BSA 2.1 m²     NPO/Void Status  Carbohydrate Drink Given Prior to Surgery? : N  Date of Last Liquid: 04/17/24  Time of Last Liquid: 0200  Date of Last Solid: 04/16/24  Time of Last Solid: 2000  Last Intake Type: Clear fluids  Time of Last Void: 0623         Physical Exam    Airway  Mallampati: II  TM distance: >3 FB  Neck ROM: full     Cardiovascular - normal exam     Dental - normal exam     Pulmonary - normal exam     Abdominal - normal exam              Anesthesia Plan    History of general anesthesia?: yes  History of complications of general anesthesia?: no    ASA 2     general     The patient is not a current smoker.    intravenous induction   Postoperative administration of opioids is intended.  Anesthetic plan and risks discussed with patient.  Use of blood products discussed with patient who.    Plan discussed with CAA.

## 2024-04-17 NOTE — DISCHARGE INSTRUCTIONS
Lumbar Fusion (Dr. Herr)    REMEMBER:  ACTIVTY:  Move regularly. Avoid staying in any one position longer than 1-2 hours, ambulate/walk frequently while awake. This will help with stiffness.  May sleep in any position that is most comfortable, in a bed or recliner.     No NSAIDs (Advil, Aleve, Motrin, ibuprofen, naproxen, diclofenac, meloxicam, Celebrex, etc) for 3 months following fusion surgery, except if prescribed one week of Ketorolac.      RESTRICTIONS:  Do not drive for at least 24 hours after surgery or while taking narcotics (Percocet/oxycodone, hydrocodone, tramadol, etc) pain medication.    No pushing, pulling, or lifting of objects greater than 5-10 pounds for 3 weeks. A gallon of milk is 8 pounds for reference. Then may lift up to 15 pounds.   No extreme bending, twisting, or turning of low back. May move as needed for activities of daily living.       BLOOD THINNERS:  May restart 3-5 days after surgery if taking blood thinners (Coumadin, warfarin, Lovenox, etc), or as directed by prescribing provider.   Continue aspirin daily without restriction.      WOUND CARE:  Do not shower for 48 hours following surgery.   Keep surgical incision clean and dry until shower. Change with non-adherent dressing daily and as needed.  If no drainage, may cover or leave uncovered based on personal preference.   Do not remove Steri-Strips they will fall off on their own.   Any nylon sutures will be removed by provider at follow up visit.     Remove lidocaine patch after 12 hours.       DIET:  Continue on clear liquid diet until passing gas.  Then may resume regular diet.   Continue Docusate until bowel movement.   If needed add Milk of Magnesia or Magnesium Citrate.  If you do not have a bowel movement in 72 hours with magnesium citrate, go to the Emergency Department.     REMEMBER:   It is normal to have post-surgical back pain/spasms, even with small incisions.   Ice and/or heat may be helpful.    It is normal to have  coming and going nerve pain in the legs as the nerve heals.   Nerve pain may be replaced initially with numbness and tingling/ pins and needles.    Week to week, post-surgical pain should become less often and less intense.   Continue medication as prescribed.      CALL PHYSICIAN:   Signs of infection at incision site:   progressive drainage or an unusual odor  increased redness and or swelling  increased pain and or tenderness    Excessive Bleeding:  Slow general oozing that completely soaks dressing  Fresh bright red bleeding or bleeding that will not stop.   It is normal to have a couple of days of drainage, but continues beyond 5 days of surgery.   Inability to go the bathroom    FOR PRESCRIPTION REFILLS GIVE 48 HOURS NOTICE. Do not wait until Friday after 3pm to call.    Follow-up 2-3 weeks from surgery for radiographs and suture removal.    Dr. Marvin Herr' Office  [facility] - : [phone]  [place] Voicemail (Henry Sethi): [phone]  *Please leave Name, , & call back number

## 2024-04-17 NOTE — OP NOTE
L5-S1 Transabdominal Approach Anterior Lumbar Fusion; L5-S1 Posterior Instrumentation Operative Note     Date: 2024  OR Location: [facility] A OR    Name: Trip Brown, : 1960, Age: 63 y.o., MRN: [id number], Sex: male    Diagnosis  Pre-op Diagnosis     * Lumbar radiculopathy [M54.16]     * Spondylolisthesis, unspecified spinal region [M43.10] Post-op Diagnosis     * Lumbar radiculopathy [M54.16]     * Spondylolisthesis, unspecified spinal region [M43.10]     Procedures    L5-S1 anterior lumbar interbody fusion with femoral ring allograft, bone morphogenic protein, demineralized bone matrix and allograft chips through a transabdominal approach; percutaneous pedicle screw instrumentation L5-S1 with Medtronic Voyager instrumentation    Surgeons   Panel 1:     * Marvin Herr - Primary  Panel 2:     * Marvin Kwan - Primary    Resident/Fellow/Other Assistant:  Surgeons and Role:  Panel 1:     * Jossie Vasques PA-C - Assisting    Procedure Summary  Anesthesia: General  ASA: II  Anesthesia Staff: Anesthesiologist: Holland Rogers MD  CRNA: Isreal Cox, APRN-CNP, APRN-CRNA  Estimated Blood Loss: 25mL  Intra-op Medications:   Administrations occurring from 0730 to 1100 on 24:   Medication Name Total Dose   gelatin absorbable (Gelfoam) 100 sponge 1 each   thrombin (recombinant) (Recothrom) topical solution 10,000 Units   sodium chloride 0.9 % irrigation solution 1,000 mL              Anesthesia Record               Intraprocedure I/O Totals       None           Specimen: No specimens collected     Staff:   Circulator: Jeniffer Joel RN; Izabel Obregon RN  Scrub Person: Ellie Guidry; Deejay Knapp, RN         Drains and/or Catheters: * None in log *    Tourniquet Times:         Implants:  Implants       Type Name Action Serial No.      Graft INFUSE BMP EXTRA-SMALL - [id number] Implanted      Graft BONE CHIPS, CANCELL 15CC 1-4MM - [id number] - [id number] Implanted [id number]     Spinal Hardware  PUTTY, ORTHOBLAST II 5ML - [id number] - [id number] Implanted [id number]     Screw screw Implanted N/A     Screw WASHER, 13MM - SN/A - [id number] Implanted N/A      FRA Anterior, Narrow, Lordotic Implanted [id number]      SCREW SET, MERLE CRAMER, 5.5/6.0 - SN/A - [id number] Implanted N/A      IMPLANT RECORD Implanted                   Indications: Trip Brown is an 63 y.o. male who is having surgery for Lumbar radiculopathy [M54.16]  Spondylolisthesis, unspecified spinal region [M43.10].     The patient was seen in the preoperative area. The risks, benefits, complications, treatment options, non-operative alternatives, expected recovery and outcomes were discussed with the patient. The possibilities of reaction to medication, pulmonary aspiration, injury to surrounding structures, bleeding, recurrent infection, the need for additional procedures, failure to diagnose a condition, and creating a complication requiring transfusion or operation were discussed with the patient. The patient concurred with the proposed plan, giving informed consent.  The site of surgery was properly noted/marked if necessary per policy. The patient has been actively warmed in preoperative area. Preoperative antibiotics have been ordered and given within 1 hours of incision. Venous thrombosis prophylaxis have been ordered including bilateral sequential compression devices    Procedure Details: Patient was taken to the operating room where a formal timeout was done according to hospital protocol.  Patient was intubated without difficulty.  Patient was given preoperative antibiotics.  Patient was positioned supine or table and the abdomen was prepped and draped in usual fashion.  A transabdominal approach was then performed by Dr. Kwan.  Please refer to his operative note for the exposure details to the L5-S1 disc space.  Once the disc base was localized with C-arm fluoroscopy, we then did a complete discectomy.  I dilated open the disc  base.  Prepared the endplates.  Ultimately satisfied with a 15 mm femoral ring allograft which was packed with a infuse sponge, allograft chips and demineralized bone matrix.  Bone graft was inserted the appropriate position.  There was good reduction of the spondylolisthesis as well as foraminal height restoration.  A single anterior blocking screw was placed in the S1 vertebral body.  The wound was then closed in usual fashion.  Again please refer to Dr. Kwan's operative note for closure details.    Once the anterior lumbar fusion was completed, and a sterile dressing was applied.  The patient was then rolled in the prone position the Henry table.  Percutaneous pedicle screw were then placed in the usual fashion L5-S1.  Submuscular thierry was passed.  Screws were tightened  specification.  X-ray confirmed satisfactory placement.  Wounds were closed in usual fashion sterile dressing was applied.    I was present and scrubbed for the entire procedure.  The physician assistant was present, scrubbed and participated in all portions of the procedure, as no qualified surgeon physician and/or resident surgeon was available to assist in the case.      Marvin Herr MD    Chief of Spine Surgery, [facility]  Director of Spine Service, [facility]  , Department of Orthopaedics  [facility] [employer]  [id number] [place] [address]  P: [phone]    This note was dictated with voice recognition software.  It has not been proofread for grammatical errors, typographical mistakes or other semantic inconsistencies.      Complications:  None; patient tolerated the procedure well.    Disposition: PACU - hemodynamically stable.  Condition: stable             Attending Attestation:     Marvin Herr  Phone Number: [phone]

## 2024-04-17 NOTE — PROGRESS NOTES
Physical Therapy    Physical Therapy Evaluation & Treatment    Patient Name: Trip Brown  MRN: [id number]  Today's Date: 4/17/2024   Time Calculation  Start Time: 1335  Stop Time: 1359  Time Calculation (min): 24 min    Assessment/Plan   PT Assessment  PT Assessment Results: Decreased strength, Decreased endurance, Impaired balance, Decreased mobility, Orthopedic restrictions, Pain  Rehab Prognosis: Good  Barriers to Discharge: None identified  Evaluation/Treatment Tolerance: Patient limited by pain, Other (Comment) (Lightheadedness with ambulation)  Medical Staff Made Aware: Yes  Strengths: Ability to acquire knowledge, Premorbid level of function, Attitude of self  Barriers to Participation: Support and attitude of living partners  End of Session Communication: Bedside nurse  Assessment Comment: Pt presents today with fair functional BLE strength, balance, and participation. Pt would benefit from continued PT to continue gait training and attempt stair negotiation to maximize independence with functional mobility at D/C.  End of Session Patient Position: Up in chair, Alarm on   IP OR SWING BED PT PLAN  Inpatient or Swing Bed: Inpatient  PT Plan  Treatment/Interventions: Bed mobility, Transfer training, Gait training, Stair training, Balance training, Strengthening, Endurance training, Therapeutic exercise, Therapeutic activity, Home exercise program, Positioning  PT Plan: Skilled PT  PT Frequency: Daily  PT Discharge Recommendations: Low intensity level of continued care  Equipment Recommended upon Discharge: Wheeled walker (Possible cane for stair negotiation)  PT Recommended Transfer Status: Assist x1, Assistive device (Min assist)  PT - OK to Discharge: Yes (Per PT POC)    Subjective     General Visit Information:  General  Reason for Referral: 62 y/o M s/p L5-S1 Transabdominal Approach Anterior Lumbar Fusion; L5-S1 Posterior Instrumentation on 4/17/24  Referred By: GERA Herr  Past Medical History  Relevant to Rehab: Asthma, bipolar disorder, CKD, COPD, polysubstance abuse, SVT, thrombocytopenia  Family/Caregiver Present: Yes  Caregiver Feedback: Sister present  Prior to Session Communication: Bedside nurse  Patient Position Received: Bed, 3 rail up, Alarm on  Preferred Learning Style: auditory, kinesthetic  General Comment: Pt supine in bed upon PT arrival. Cleared to participate with RN, and agreeable to PT evaluation.  Home Living:  Home Living  Type of Home: House  Lives With: Alone  Home Adaptive Equipment: Cane  Home Layout: Two level, Stairs to alternate level with rails  Alternate Level Stairs-Rails: None  Alternate Level Stairs-Number of Steps: 10 (To basement)  Home Access: Stairs to enter without rails  Entrance Stairs-Rails: None  Entrance Stairs-Number of Steps: 10+3  Bathroom Shower/Tub: Walk-in shower  Bathroom Toilet: Standard  Bathroom Equipment: None  Home Living Comments: Bed/bath in basement  Prior Level of Function:  Prior Function Per Pt/Caregiver Report  Level of [place]: Independent with ADLs and functional transfers, Independent with homemaking with ambulation  ADL Assistance: Independent  Homemaking Assistance: Independent  Ambulatory Assistance: Independent (Mod IND with cane)  Prior Function Comments: (-) Driving. Pt denies recent falls  Precautions:  Precautions  Medical Precautions: Fall precautions, Spinal precautions  Post-Surgical Precautions: Spinal precautions    Objective   Pain:  Pain Assessment  Pain Assessment: 0-10  Pain Score: 10 - Worst possible pain  Pain Type: Surgical pain  Pain Location: Back  Pain Orientation: Lower  Pain Interventions: Ambulation/increased activity, Repositioned  Response to Interventions: Unchanged  Multiple Pain Sites: Two  Pain 2  Pain Score 2: 10 - Worst possible pain  Pain Type 2: Surgical pain  Pain Location 2: Abdomen  Cognition:  Cognition  Orientation Level: Oriented X4  Impulsive: Mildly    General Assessments:  Activity  Tolerance  Endurance: Tolerates 10 - 20 min exercise with multiple rests    Sensation  Sensation Comment: Numbness reported in RLE from the knee to the foot    Coordination  Movements are Fluid and Coordinated: Yes    Postural Control  Postural Control: Within Functional Limits    Static Sitting Balance  Static Sitting-Balance Support: Bilateral upper extremity supported, Feet supported  Static Sitting-Level of Assistance: Close supervision  Static Sitting-Comment/Number of Minutes: About 1 minute    Static Standing Balance  Static Standing-Balance Support: Bilateral upper extremity supported  Static Standing-Level of Assistance: Contact guard  Static Standing-Comment/Number of Minutes: With FWW support  Dynamic Standing Balance  Dynamic Standing-Balance Support: Bilateral upper extremity supported  Dynamic Standing-Comments: Pt able to reach down to pull up undergarment while standing with FWW and CGA  Functional Assessments:  Stairs  Stairs: No  Extremity/Trunk Assessments:  RLE   RLE : Exceptions to WFL  Strength RLE  RLE Overall Strength: Greater than or equal to 3/5 as evidenced by functional mobility  LLE   LLE : Exceptions to WFL  Strength LLE  LLE Overall Strength: Greater than or equal to 3/5 as evidenced by functional mobility  Treatments:  Bed Mobility  Bed Mobility: Yes  Bed Mobility 1  Bed Mobility 1: Supine to sitting, Log roll  Level of Assistance 1: Contact guard, Minimal verbal cues  Bed Mobility Comments 1: VC for sequencing log roll the the left. CGA at trunk during trunk lift into upright sitting.    Ambulation/Gait Training  Ambulation/Gait Training Performed: Yes  Ambulation/Gait Training 1  Surface 1: Level tile  Device 1: Rolling walker  Gait Support Devices: Gait belt  Assistance 1: Contact guard  Comments/Distance (ft) 1: About 21 ft. Good step length with decreased ronnell. Good foot clearance with wide ELIZA. No overt LOB. Longer distance not attempted d/t pt report of  lightheadedness.  Transfers  Transfer: Yes  Transfer 1  Transfer From 1: Bed to  Transfer to 1: Stand  Technique 1: Sit to stand  Transfer Device 1: Walker, Gait belt  Transfer Level of Assistance 1: Minimum assistance, Minimal verbal cues  Trials/Comments 1: VC for BUE push from bed instaed of pulling from walker to stand. Slgihtly increased time to reach full stand with pt in initial trunk flexion. Pt able to improve posture with VC.  Transfers 2  Transfer From 2: Stand to  Transfer to 2: Chair with arms  Technique 2: Stand to sit  Transfer Device 2: Walker (Gait belt)  Transfer Level of Assistance 2: Contact guard, Minimal verbal cues  Trials/Comments 2: VC for BLE positioning in front of the chair with the walker before sitting. VC for BUE reach for armrests of chair when sitting with return demonstration. Fair eccentric control 3/4 of the descent to chair. Decreased eccentric control on the last 1/4.  Outcome Measures:  [facility] Basic Mobility  Turning from your back to your side while in a flat bed without using bedrails: A little  Moving from lying on your back to sitting on the side of a flat bed without using bedrails: A little  Moving to and from bed to chair (including a wheelchair): A little  Standing up from a chair using your arms (e.g. wheelchair or bedside chair): A little  To walk in hospital room: A little  Climbing 3-5 steps with railing: A lot  Basic Mobility - Total Score: 17    Encounter Problems       Encounter Problems (Active)       Balance       Goal 1 (Progressing)       Start:  04/17/24    Expected End:  05/01/24       Pt performs all sitting balance mod IND and standing balance with close supervision using LRAD            Mobility       STG - Patient will navigate 4-6 steps x 2 with cane mod IND (Not Progressing)       Start:  04/17/24    Expected End:  05/01/24            STG - Patient will ambulate (Progressing)       Start:  04/17/24    Expected End:  05/01/24       150 ft with close  supervision using LRAD            PT Problem       PT Goal 1 (Not Progressing)       Start:  04/17/24    Expected End:  05/01/24       Pt demonstrates IND in performing 2 sets of 12 reps of prescribed BLE HEP            PT Transfers       STG - Patient to transfer to and from sit to supine (Progressing)       Start:  04/17/24    Expected End:  05/01/24       Mod IND using the log roll technique         STG - Patient will transfer sit to and from stand (Progressing)       Start:  04/17/24    Expected End:  05/01/24       With close supervision using LRAD              Education Documentation  Handouts, taught by Dipak Vines PT at 4/17/2024  2:22 PM.  Learner: Patient  Readiness: Acceptance  Method: Explanation, Demonstration, Handout  Response: Verbalizes Understanding    Precautions, taught by Dipak Vines PT at 4/17/2024  2:22 PM.  Learner: Patient  Readiness: Acceptance  Method: Explanation, Demonstration, Handout  Response: Verbalizes Understanding    Body Mechanics, taught by Dipak Vines PT at 4/17/2024  2:22 PM.  Learner: Patient  Readiness: Acceptance  Method: Explanation, Demonstration, Handout  Response: Verbalizes Understanding    Mobility Training, taught by Dipak Vines PT at 4/17/2024  2:22 PM.  Learner: Patient  Readiness: Acceptance  Method: Explanation, Demonstration, Handout  Response: Verbalizes Understanding    Education Comments  No comments found.

## 2024-04-17 NOTE — ANESTHESIA PROCEDURE NOTES
Airway  Date/Time: 4/17/2024 7:45 AM  Urgency: elective    Airway not difficult    Staffing  Performed: CRNA   Authorized by: Holland Rogers MD    Performed by: Isreal Cox, APRN-CNP, APRN-CRNA  Patient location during procedure: OR    Indications and Patient Condition  Indications for airway management: anesthesia  Spontaneous ventilation: present  Sedation level: deep  Preoxygenated: yes  Patient position: sniffing  MILS maintained throughout  Mask difficulty assessment: 1 - vent by mask  No planned trial extubation    Final Airway Details  Final airway type: endotracheal airway      Successful airway: ETT  Cuffed: yes   Successful intubation technique: direct laryngoscopy  Blade: P[place]or  Blade size: #3  ETT size (mm): 8.0  Cormack-Lehane Classification: grade IIa - partial view of glottis  Placement verified by: capnometry   Measured from: teeth  ETT to teeth (cm): 23  Number of attempts at approach: 1

## 2024-04-17 NOTE — PERIOPERATIVE NURSING NOTE
1026: pt arrives to [facility] post L5- S1 transabdominal anterior lumbar fusion. OR team at bedside with anesthesia. Report given, pt stable, vss. Family updated via text. Pt sedated but stable, unable to follow commands. 10L 02 via face mask.     1040: 02 removed pt oxygen saturation 95% on RA     1057: pt able to follow commands, lethargic. Neuro assessment complete.     1058: family called with update     1113: pt placed on 3L       1124: tolerating small sips of water    1128: pt states pain is tolerable

## 2024-04-17 NOTE — ANESTHESIA POSTPROCEDURE EVALUATION
Patient: Trip Brown    Procedure Summary       Date: 04/17/24 Room / Location: [facility] A OR 07 / [facility] A OR    Anesthesia Start: 0736 Anesthesia Stop: 1033    Procedures:       L5-S1 Transabdominal Approach Anterior Lumbar Fusion; L5-S1 Posterior Instrumentation (Spine Lumbar)      Spinal Exposure (Spine Lumbar) Diagnosis:       Lumbar radiculopathy      Spondylolisthesis, unspecified spinal region      (Lumbar radiculopathy [M54.16])      (Spondylolisthesis, unspecified spinal region [M43.10])    Surgeons: Marvin Herr MD; Marvin Kwan DO Responsible Provider: Holland Rogers MD    Anesthesia Type: general ASA Status: 2            Anesthesia Type: general    Vitals Value Taken Time   /62 04/17/24 1046   Temp 36 °C (96.8 °F) 04/17/24 1026   Pulse 59 04/17/24 1054   Resp 20 04/17/24 1045   SpO2 95 % 04/17/24 1054   Vitals shown include unfiled device data.    Anesthesia Post Evaluation    Patient location during evaluation: PACU  Patient participation: complete - patient participated  Level of consciousness: awake  Pain management: adequate  Airway patency: patent  Cardiovascular status: acceptable  Respiratory status: acceptable  Hydration status: acceptable  Postoperative Nausea and Vomiting: none        No notable events documented.

## 2024-04-17 NOTE — PROGRESS NOTES
04/17/24 1410   Discharge Planning   Living Arrangements Alone   Support Systems Family members   Assistance Needed None   Type of Residence Private residence   Number of Stairs to Enter Residence 3   Number of Stairs Within Residence 13   Do you have animals or pets at home? No   Who is requesting discharge planning? Provider   Home or Post Acute Services In home services   Type of Home Care Services Home OT;Home PT   Patient expects to be discharged to: home   Does the patient need discharge transport arranged? No   Financial Resource Strain   How hard is it for you to pay for the very basics like food, housing, medical care, and heating? Not hard   Housing Stability   In the last 12 months, was there a time when you were not able to pay the mortgage or rent on time? N   In the last 12 months, how many places have you lived? 1   In the last 12 months, was there a time when you did not have a steady place to sleep or slept in a shelter (including now)? N   Transportation Needs   In the past 12 months, has lack of transportation kept you from medical appointments or from getting medications? no   In the past 12 months, has lack of transportation kept you from meetings, work, or from getting things needed for daily living? No   Patient Choice   Provider Choice list and CMS website (https://medicare.gov/care-compare#search) for post-acute Quality and Resource Measure Data were provided and reviewed with: Patient   Patient / Family choosing to utilize agency / facility established prior to hospitalization No     4/17/24 1411  Met with patient and sister at bedside to discuss discharge planning.   Patient rents a basement room in a house.  There are 13 steps that he has to manage and there is no railing.  He is independent with ADLs.  He does not use any assistive devices for ambulation.  He does not drive but he has access to transportation.  He is concerned about going home alone.  He has no one to stay with him.   His sister stated that they were told by someone at Dr Bae's office that he would be able to go to SNF at discharge.  She states that they did not know that was not the intention until after surgery.  Explained to them that expectation is that patient will go home with HHC.  Explained that PT/OT will work with patient in the hospital so that he is equipped to go home.  He will also need a walker for going home as well.  He is agreeable to HHC.  Explained HHC options and expectations to patient.  He would like to use [facility].  Plan for discharge home in 1-2 days.  Shantal Solorzano RN [facility]

## 2024-04-18 ENCOUNTER — PHARMACY VISIT (OUTPATIENT)
Dept: PHARMACY | Facility: CLINIC | Age: 64
End: 2024-04-18
Payer: COMMERCIAL

## 2024-04-18 ENCOUNTER — DOCUMENTATION (OUTPATIENT)
Dept: HOME HEALTH SERVICES | Facility: HOME HEALTH | Age: 64
End: 2024-04-18
Payer: MEDICAID

## 2024-04-18 ENCOUNTER — HOME HEALTH ADMISSION (OUTPATIENT)
Dept: HOME HEALTH SERVICES | Facility: HOME HEALTH | Age: 64
End: 2024-04-18
Payer: COMMERCIAL

## 2024-04-18 VITALS
SYSTOLIC BLOOD PRESSURE: 118 MMHG | WEIGHT: 192.24 LBS | OXYGEN SATURATION: 94 % | HEIGHT: 72 IN | BODY MASS INDEX: 26.04 KG/M2 | DIASTOLIC BLOOD PRESSURE: 75 MMHG | TEMPERATURE: 97.9 F | HEART RATE: 76 BPM | RESPIRATION RATE: 18 BRPM

## 2024-04-18 LAB
ANION GAP SERPL CALC-SCNC: 15 MMOL/L (ref 10–20)
BUN SERPL-MCNC: 16 MG/DL (ref 6–23)
CALCIUM SERPL-MCNC: 9.5 MG/DL (ref 8.6–10.3)
CHLORIDE SERPL-SCNC: 108 MMOL/L (ref 98–107)
CO2 SERPL-SCNC: 18 MMOL/L (ref 21–32)
CREAT SERPL-MCNC: 1.25 MG/DL (ref 0.5–1.3)
EGFRCR SERPLBLD CKD-EPI 2021: 64 ML/MIN/1.73M*2
ERYTHROCYTE [DISTWIDTH] IN BLOOD BY AUTOMATED COUNT: 13.2 % (ref 11.5–14.5)
GLUCOSE SERPL-MCNC: 172 MG/DL (ref 74–99)
HCT VFR BLD AUTO: 41.3 % (ref 41–52)
HGB BLD-MCNC: 14.5 G/DL (ref 13.5–17.5)
MCH RBC QN AUTO: 33.8 PG (ref 26–34)
MCHC RBC AUTO-ENTMCNC: 35.1 G/DL (ref 32–36)
MCV RBC AUTO: 96 FL (ref 80–100)
NRBC BLD-RTO: 0 /100 WBCS (ref 0–0)
PLATELET # BLD AUTO: 229 X10*3/UL (ref 150–450)
POTASSIUM SERPL-SCNC: 4.6 MMOL/L (ref 3.5–5.3)
RBC # BLD AUTO: 4.29 X10*6/UL (ref 4.5–5.9)
SODIUM SERPL-SCNC: 136 MMOL/L (ref 136–145)
WBC # BLD AUTO: 12.5 X10*3/UL (ref 4.4–11.3)

## 2024-04-18 PROCEDURE — 97116 GAIT TRAINING THERAPY: CPT | Mod: GP,CQ

## 2024-04-18 PROCEDURE — 2500000001 HC RX 250 WO HCPCS SELF ADMINISTERED DRUGS (ALT 637 FOR MEDICARE OP): Performed by: PHYSICIAN ASSISTANT

## 2024-04-18 PROCEDURE — 97165 OT EVAL LOW COMPLEX 30 MIN: CPT | Mod: GO

## 2024-04-18 PROCEDURE — 85027 COMPLETE CBC AUTOMATED: CPT | Performed by: PHYSICIAN ASSISTANT

## 2024-04-18 PROCEDURE — 36415 COLL VENOUS BLD VENIPUNCTURE: CPT | Performed by: PHYSICIAN ASSISTANT

## 2024-04-18 PROCEDURE — 97535 SELF CARE MNGMENT TRAINING: CPT | Mod: GO

## 2024-04-18 PROCEDURE — 2500000004 HC RX 250 GENERAL PHARMACY W/ HCPCS (ALT 636 FOR OP/ED): Performed by: PHYSICIAN ASSISTANT

## 2024-04-18 PROCEDURE — 2500000006 HC RX 250 W HCPCS SELF ADMINISTERED DRUGS (ALT 637 FOR ALL PAYERS): Mod: MUE | Performed by: PHYSICIAN ASSISTANT

## 2024-04-18 PROCEDURE — 99024 POSTOP FOLLOW-UP VISIT: CPT

## 2024-04-18 PROCEDURE — RXMED WILLOW AMBULATORY MEDICATION CHARGE

## 2024-04-18 PROCEDURE — 94640 AIRWAY INHALATION TREATMENT: CPT

## 2024-04-18 PROCEDURE — 2500000002 HC RX 250 W HCPCS SELF ADMINISTERED DRUGS (ALT 637 FOR MEDICARE OP, ALT 636 FOR OP/ED): Mod: MUE | Performed by: PHYSICIAN ASSISTANT

## 2024-04-18 PROCEDURE — 82374 ASSAY BLOOD CARBON DIOXIDE: CPT | Performed by: PHYSICIAN ASSISTANT

## 2024-04-18 RX ORDER — OXYCODONE HYDROCHLORIDE 5 MG/1
5 TABLET ORAL EVERY 6 HOURS PRN
Qty: 28 TABLET | Refills: 0 | Status: SHIPPED | OUTPATIENT
Start: 2024-04-18 | End: 2024-04-25

## 2024-04-18 RX ORDER — KETOROLAC TROMETHAMINE 10 MG/1
10 TABLET, FILM COATED ORAL EVERY 6 HOURS PRN
Qty: 15 TABLET | Refills: 0 | Status: SHIPPED | OUTPATIENT
Start: 2024-04-18 | End: 2024-04-23

## 2024-04-18 RX ORDER — ACETAMINOPHEN 500 MG
1000 TABLET ORAL EVERY 6 HOURS PRN
Qty: 56 TABLET | Refills: 0 | Status: SHIPPED | OUTPATIENT
Start: 2024-04-18 | End: 2024-04-25

## 2024-04-18 RX ORDER — CYCLOBENZAPRINE HCL 10 MG
10 TABLET ORAL 3 TIMES DAILY PRN
Qty: 30 TABLET | Refills: 0 | Status: SHIPPED | OUTPATIENT
Start: 2024-04-18 | End: 2024-04-30 | Stop reason: SDUPTHER

## 2024-04-18 RX ORDER — DOCUSATE SODIUM 100 MG/1
100 CAPSULE, LIQUID FILLED ORAL 3 TIMES DAILY PRN
Qty: 21 CAPSULE | Refills: 0 | Status: SHIPPED | OUTPATIENT
Start: 2024-04-18 | End: 2024-04-25

## 2024-04-18 RX ADMIN — ACETAMINOPHEN 650 MG: 325 TABLET ORAL at 06:18

## 2024-04-18 RX ADMIN — FLUOXETINE HYDROCHLORIDE 60 MG: 20 CAPSULE ORAL at 06:17

## 2024-04-18 RX ADMIN — OXYCODONE HYDROCHLORIDE 10 MG: 5 TABLET ORAL at 01:50

## 2024-04-18 RX ADMIN — PANTOPRAZOLE SODIUM 40 MG: 40 TABLET, DELAYED RELEASE ORAL at 06:17

## 2024-04-18 RX ADMIN — METHOCARBAMOL 500 MG: 500 TABLET ORAL at 12:29

## 2024-04-18 RX ADMIN — LITHIUM CARBONATE 300 MG: 300 CAPSULE, GELATIN COATED ORAL at 09:43

## 2024-04-18 RX ADMIN — METOPROLOL SUCCINATE 25 MG: 25 TABLET, EXTENDED RELEASE ORAL at 09:43

## 2024-04-18 RX ADMIN — ACETAMINOPHEN 650 MG: 325 TABLET ORAL at 12:29

## 2024-04-18 RX ADMIN — TIOTROPIUM BROMIDE INHALATION SPRAY 2 PUFF: 3.12 SPRAY, METERED RESPIRATORY (INHALATION) at 08:10

## 2024-04-18 RX ADMIN — AMANTADINE HYDROCHLORIDE 100 MG: 100 CAPSULE ORAL at 09:43

## 2024-04-18 RX ADMIN — DOCUSATE SODIUM 100 MG: 100 CAPSULE, LIQUID FILLED ORAL at 09:43

## 2024-04-18 RX ADMIN — METHOCARBAMOL 500 MG: 500 TABLET ORAL at 06:17

## 2024-04-18 RX ADMIN — SODIUM CHLORIDE 100 ML/HR: 9 INJECTION, SOLUTION INTRAVENOUS at 01:50

## 2024-04-18 RX ADMIN — OXYCODONE HYDROCHLORIDE 10 MG: 5 TABLET ORAL at 06:18

## 2024-04-18 RX ADMIN — TAMSULOSIN HYDROCHLORIDE 0.4 MG: 0.4 CAPSULE ORAL at 09:43

## 2024-04-18 RX ADMIN — DEXAMETHASONE SODIUM PHOSPHATE 10 MG: 4 INJECTION, SOLUTION INTRA-ARTICULAR; INTRALESIONAL; INTRAMUSCULAR; INTRAVENOUS; SOFT TISSUE at 06:17

## 2024-04-18 RX ADMIN — FORMOTEROL FUMARATE DIHYDRATE 20 MCG: 20 SOLUTION RESPIRATORY (INHALATION) at 08:09

## 2024-04-18 RX ADMIN — DILTIAZEM HYDROCHLORIDE 240 MG: 120 CAPSULE, EXTENDED RELEASE ORAL at 06:17

## 2024-04-18 ASSESSMENT — COGNITIVE AND FUNCTIONAL STATUS - GENERAL
MOVING TO AND FROM BED TO CHAIR: A LITTLE
MOVING FROM LYING ON BACK TO SITTING ON SIDE OF FLAT BED WITH BEDRAILS: A LITTLE
TURNING FROM BACK TO SIDE WHILE IN FLAT BAD: A LITTLE
HELP NEEDED FOR BATHING: A LITTLE
STANDING UP FROM CHAIR USING ARMS: A LITTLE
DAILY ACTIVITIY SCORE: 22
DRESSING REGULAR LOWER BODY CLOTHING: A LITTLE
MOBILITY SCORE: 18
WALKING IN HOSPITAL ROOM: A LITTLE
CLIMB 3 TO 5 STEPS WITH RAILING: A LITTLE

## 2024-04-18 ASSESSMENT — ACTIVITIES OF DAILY LIVING (ADL)
BATHING_ASSISTANCE: STAND BY
HOME_MANAGEMENT_TIME_ENTRY: 15
ADL_ASSISTANCE: INDEPENDENT

## 2024-04-18 ASSESSMENT — PAIN - FUNCTIONAL ASSESSMENT
PAIN_FUNCTIONAL_ASSESSMENT: 0-10

## 2024-04-18 ASSESSMENT — PAIN SCALES - GENERAL
PAINLEVEL_OUTOF10: 8
PAINLEVEL_OUTOF10: 7
PAINLEVEL_OUTOF10: 3
PAINLEVEL_OUTOF10: 7

## 2024-04-18 ASSESSMENT — PAIN DESCRIPTION - DESCRIPTORS: DESCRIPTORS: ACHING

## 2024-04-18 NOTE — HH CARE COORDINATION
Home Care received a Referral for Physical Therapy and Occupational Therapy. We have processed the referral for a Start of Care on 4/19-4/20.     If you have any questions or concerns, please feel free to contact us at [phone]. Follow the prompts, enter your five digit zip code, and you will be directed to your care team on [facility] 3.

## 2024-04-18 NOTE — PROGRESS NOTES
Occupational Therapy    Evaluation/Treatment    Patient Name: Trip Brown  MRN: [id number]  : 1960  Today's Date: 24  Time Calculation  Start Time: 1153  Stop Time: 1220  Time Calculation (min): 27 min       Assessment:  OT Assessment: Pt seen for OT eval and tx. Pt demonstrates with assist with LE ADLS and mobility. Pt would benefit from low intenesity therapy  Prognosis: Good  Barriers to Discharge: None  Evaluation/Treatment Tolerance: Patient limited by pain  Medical Staff Made Aware: Yes  End of Session Communication: Bedside nurse  End of Session Patient Position: Up in chair, Alarm on  OT Assessment Results: Decreased ADL status, Decreased safe judgment during ADL, Decreased functional mobility  Prognosis: Good  Barriers to Discharge: None  Evaluation/Treatment Tolerance: Patient limited by pain  Medical Staff Made Aware: Yes  Strengths: Living arrangement secure, Rehab experience, Support of extended family/friends  Barriers to Participation: Housing layout  Plan:  Treatment Interventions: ADL retraining, Functional transfer training, Endurance training, Equipment evaluation/education  OT Frequency: 3 times per week  OT Discharge Recommendations: Low intensity level of continued care  Equipment Recommended upon Discharge: Wheeled walker  OT Recommended Transfer Status: Stand by assist  OT - OK to Discharge: Yes  Treatment Interventions: ADL retraining, Functional transfer training, Endurance training, Equipment evaluation/education    Subjective   Current Problem:  1. Spondylolisthesis  FL less than 1 hour    FL less than 1 hour      2. Lumbar radiculopathy  oxyCODONE (Roxicodone) 5 mg immediate release tablet    acetaminophen (Tylenol) 500 mg tablet    cyclobenzaprine (Flexeril) 10 mg tablet    docusate sodium (Colace) 100 mg capsule    ketorolac (Toradol) 10 mg tablet    Referral to Physical Therapy    Referral to Home Health      3. Spondylolisthesis, unspecified spinal region         4. Mobility impaired          General:   OT Received On: 04/18/24  General  Reason for Referral: 62 y/o M s/p L5-S1 Transabdominal Approach Anterior Lumbar Fusion; L5-S1 Posterior Instrumentation on 4/17/24  Referred By: GERA Herr  Past Medical History Relevant to Rehab: Asthma, bipolar disorder, CKD, COPD, polysubstance abuse, SVT, thrombocytopenia  Family/Caregiver Present: No  Prior to Session Communication: Bedside nurse  Patient Position Received: Up in chair, Alarm on  Preferred Learning Style: auditory, verbal, visual, written  Precautions:  Medical Precautions: Fall precautions, Spinal precautions  Post-Surgical Precautions: Spinal precautions  Vital Signs:     Pain:  Pain Assessment  Pain Assessment: 0-10  Pain Score: 3  Pain Type: Acute pain  Pain Location: Back  Pain Orientation: Lower  Pain Descriptors: Aching  Pain Frequency: Intermittent  Pain Onset: Ongoing  Pain Interventions: Medication (See MAR), Ambulation/increased activity, Distraction, Repositioned    Objective   Cognition:  Overall Cognitive Status: Within Functional Limits  Orientation Level: Oriented X4  Following Commands: Follows all commands and directions without difficulty  Attention: Exceptions to WFL (occassional distraction)  Memory: Within Funtional Limits  Insight: Within function limits  Impulsive: Mildly           Home Living:  Type of Home: House  Lives With: Alone  Home Adaptive Equipment: Cane  Home Layout: Two level, Stairs to alternate level with rails (pt reports stays in basement level uses kitchen on main level)  Alternate Level Stairs-Rails: None  Alternate Level Stairs-Number of Steps: 10  Home Access: Stairs to enter without rails  Entrance Stairs-Rails: None  Entrance Stairs-Number of Steps: 1 steps to enter , 3 steps to kitchen 10 steps to basement  Bathroom Shower/Tub: Walk-in shower  Bathroom Toilet: Standard  Bathroom Equipment: None  Home Living Comments: bed and bath in basement  Prior Function:  Level of  [place]: Independent with ADLs and functional transfers, Independent with homemaking with ambulation  ADL Assistance: Independent  Homemaking Assistance: Independent  Ambulatory Assistance: Independent  Hand Dominance: Right  IADL History:  Homemaking Responsibilities: Yes  Meal Prep Responsibility: Primary  Laundry Responsibility: Primary  Cleaning Responsibility: Primary  Bill Paying/Finance Responsibility: Primary  Shopping Responsibility: Primary  Current License: Yes (motorcycle)  Mode of Transportation: Motorcycle (Pt family assists to appts or pt utilizes public transportation)  ADL:  Eating Assistance: Independent  Grooming Assistance: Independent  Bathing Assistance: Stand by  Bathing Deficit: Setup, Increased time to complete  (fatigue with activity)  UE Dressing Assistance: Independent  LE Dressing Assistance: Stand by  LE Dressing Deficit: Verbal cueing, Increased time to complete  Toileting Assistance with Device: Independent  Activities of Daily Living:    UE Dressing  UE Dressing Level of Assistance: Independent    LE Dressing  LE Dressing: Yes  Pants Level of Assistance: Setup, Moderate verbal cues  Sock Level of Assistance: Setup, Contact guard, Maximum verbal cues  Shoe Level of Assistance: Close supervision, Minimal verbal cues  LE Dressing Where Assessed: Chair  LE Dressing Comments: pt SOB with activity encouraged pursed lip breathing and rest breasks    Toileting  Toileting Level of Assistance: Independent  Where Assessed: Toilet  Activity Tolerance:  Endurance: Tolerates 10 - 20 min exercise with multiple rests  Activity Tolerance Comments: SOB/fatigue with activity  Functional Standing Tolerance:  Time: 4 min  Activity: standing in BR  Functional Standing Tolerance Comments: Pt able to stand to urinate and wash hands  Bed Mobility/Transfers: Transfers  Transfer: Yes  Transfer 1  Technique 1: Sit to stand, Stand to sit      Functional Mobility:  Functional Mobility  Functional Mobility  Performed: Yes  Functional Mobility 1  Surface 1: Level tile  Device 1: Rolling walker  Assistance 1: Close supervision  Comments 1: Pt requires VCs as pt carrying walker into bathroom, requires cyues to keep walker safe distance  Sitting Balance:  Static Sitting Balance  Static Sitting-Balance Support: Feet supported  Static Sitting-Level of Assistance: Independent  Dynamic Sitting Balance  Dynamic Sitting-Balance Support: Feet supported  Dynamic Sitting-Balance: Reaching for objects  Dynamic Sitting-Comments: independent  Standing Balance:  Static Standing Balance  Static Standing-Balance Support: No upper extremity supported  Static Standing-Level of Assistance: Close supervision     Therapy/Activity: Therapeutic Activity  Therapeutic Activity Performed: Yes  Therapeutic Activity 1: Pt instructed in precautions, car transfers, energy conservation and ADLS     Vision:Vision - Basic Assessment  Current Vision: No visual deficits  Sensation:  Light Touch: No apparent deficits  Sharp/Dull: No apparent deficits  Sensation Comment: repors numbness R LE  Strength:  Strength Comments: MATT UE WFL  Other Activity:     Home Environment:     Perception:     Coordination:  Movements are Fluid and Coordinated: Yes   Hand Function:  Hand Function  Gross Grasp: Functional  Coordination: Functional  Extremities: RUE   RUE : Within Functional Limits and LUE   LUE: Within Functional Limits      Outcome Measures: [facility] Daily Activity  Putting on and taking off regular lower body clothing: A little  Bathing (including washing, rinsing, drying): A little  Putting on and taking off regular upper body clothing: None  Toileting, which includes using toilet, bedpan or urinal: None  Taking care of personal grooming such as brushing teeth: None  Eating Meals: None  Daily Activity - Total Score: 22        Education Documentation  Handouts, taught by Eden Kyle OT at 4/18/2024  1:30 PM.  Learner: Patient  Readiness:  Acceptance  Method: Explanation, Demonstration, Handout, Teach-back  Response: Verbalizes Understanding, Needs Reinforcement    Body Mechanics, taught by Eden Kyle OT at 4/18/2024  1:30 PM.  Learner: Patient  Readiness: Acceptance  Method: Explanation, Demonstration, Handout, Teach-back  Response: Verbalizes Understanding, Needs Reinforcement    Precautions, taught by Eden Kyle OT at 4/18/2024  1:30 PM.  Learner: Patient  Readiness: Acceptance  Method: Explanation, Demonstration, Handout, Teach-back  Response: Verbalizes Understanding, Needs Reinforcement    ADL Training, taught by Eden Kyle OT at 4/18/2024  1:30 PM.  Learner: Patient  Readiness: Acceptance  Method: Explanation, Demonstration, Handout, Teach-back  Response: Verbalizes Understanding, Needs Reinforcement    Education Comments  No comments found.        OP EDUCATION:  Education  Individual(s) Educated: Patient  Education Provided: Diagnosis & Precautions, Ergonomics and postural realignment, Fall precautons, Risk and benefits of OT discussed with patient or other, POC discussed and agreed upon  Risk and Benefits Discussed with Patient/Caregiver/Other: yes  Patient/Caregiver Demonstrated Understanding: yes  Plan of Care Discussed and Agreed Upon: yes  Patient Response to Education: Patient/Caregiver Verbalized Understanding of Information, Patient/Caregiver Performed Return Demonstration of Exercises/Activities, Patient/Caregiver Asked Appropriate Questions    Goals:  Encounter Problems       Encounter Problems (Active)       ADLs       Patient will perform LB bathing 100% with modified independent level of assistance and adaptive equipment prn . (Progressing)       Start:  04/18/24    Expected End:  05/02/24            Patient with complete lower body dressing with modified independent level of assistance donning and doffing all LE clothes  with PRN adaptive equipment while supported sitting (Progressing)       Start:   04/18/24    Expected End:  05/02/24               COGNITION/SAFETY       Patient will recall and adhere to spine  precautions during all functional mobility/ADL tasks in order to demonstrate improved understanding and promote healing post op (Progressing)       Start:  04/18/24    Expected End:  05/02/24               MOBILITY       Patient will perform Functional mobility max Household distances/Community Distances with modified independent level of assistance and front wheeled walker in order to improve safety and functional mobility. (Progressing)       Start:  04/18/24    Expected End:  05/02/24               TRANSFERS       Patient will complete functional transfer to chair  with front wheeled walker with modified independent level of assistance. (Progressing)       Start:  04/18/24    Expected End:  05/02/24

## 2024-04-18 NOTE — PROGRESS NOTES
Physical Therapy    Physical Therapy Treatment    Patient Name: Trip Brown  MRN: [id number]  Today's Date: 4/18/2024  Time Calculation  Start Time: 0950  Stop Time: 1015  Time Calculation (min): 25 min       Assessment/Plan   PT Assessment  End of Session Communication: Bedside nurse  End of Session Patient Position: Alarm on  PT Plan  Inpatient/Swing Bed or Outpatient: Inpatient  PT Plan  Treatment/Interventions: Transfer training, Gait training, Stair training  PT Plan: Skilled PT  PT Frequency: Daily  PT Discharge Recommendations: Low intensity level of continued care  Equipment Recommended upon Discharge: Wheeled walker (Possible cane for stair negotiation)  PT Recommended Transfer Status: Assist x1  PT - OK to Discharge: Yes (per POC)      General Visit Information:   PT  Visit  PT Received On: 04/18/24  General  Reason for Referral: 64 y/o M s/p L5-S1 Transabdominal Approach Anterior Lumbar Fusion; L5-S1 Posterior Instrumentation on 4/17/24  Referred By: GERA Herr  Past Medical History Relevant to Rehab: Asthma, bipolar disorder, CKD, COPD, polysubstance abuse, SVT, thrombocytopenia  Prior to Session Communication: Bedside nurse  Patient Position Received: Up in chair, Alarm on  General Comment: slow moving with all activity    Subjective   Precautions:  Precautions  Medical Precautions: Fall precautions, Spinal precautions  Vital Signs:       Objective   Pain:  Pain Assessment  Pain Score: 7  Pain Type: Surgical pain  Pain Location: Back  Pain Interventions: Medication (See MAR)  Cognition:  Cognition  Overall Cognitive Status: Within Functional Limits  Postural Control:     Extremity/Trunk Assessments:    Activity Tolerance:     Treatments:                 Ambulation/Gait Training  Ambulation/Gait Training Performed: Yes  Ambulation/Gait Training 1  Surface 1: Level tile  Device 1: Rolling walker  Gait Support Devices: Gait belt  Assistance 1: Contact guard, Close supervision  Comments/Distance (ft)  1: 125x1, 300x1. 10x2 (pt performed with slow step through gait pattern.  min vc for distance from RW.  x3 short standing rest breaks. pt at times walked away from RW x2 short dsitances no LOB VC to keep AD near pt to improve safety.)  Transfer 1  Technique 1: Sit to stand, Stand to sit  Transfer Device 1: Walker  Transfer Level of Assistance 1: Close supervision  Trials/Comments 1: vc/tc for hand placment on solid sitting surface and not RW. pt performed x2    Stairs  Stairs: Yes  Stairs  Comment/Number of Steps 1: pt performed 4 steps with 1 rail and SPC. VC for AD placement and BLE sequence.  CGA.  pt performed x2, and a 3rd trial with SPC and HHA.  no noted LOB in any trials.  slow to complete.    Outcome Measures:  [facility] Basic Mobility  Turning from your back to your side while in a flat bed without using bedrails: A little  Moving from lying on your back to sitting on the side of a flat bed without using bedrails: A little  Moving to and from bed to chair (including a wheelchair): A little  Standing up from a chair using your arms (e.g. wheelchair or bedside chair): A little  To walk in hospital room: A little  Climbing 3-5 steps with railing: A little  Basic Mobility - Total Score: 18    Education Documentation  Precautions, taught by Geoffrey Mejia PTA at 4/18/2024  2:33 PM.  Learner: Patient  Readiness: Acceptance  Method: Explanation  Response: Verbalizes Understanding    Mobility Training, taught by Geoffrey Mejia PTA at 4/18/2024  2:33 PM.  Learner: Patient  Readiness: Acceptance  Method: Explanation  Response: Verbalizes Understanding    Education Comments  No comments found.        OP EDUCATION:  Outpatient Education  Education Comment: Educated pt on surgical precautions.  pt stated understanding    Encounter Problems       Encounter Problems (Resolved)       Balance       Goal 1 (Adequate for Discharge)       Start:  04/17/24    Expected End:  05/01/24    Resolved:  04/18/24    Pt performs all  sitting balance mod IND and standing balance with close supervision using LRAD            Mobility       STG - Patient will navigate 4-6 steps x 2 with cane mod IND (Adequate for Discharge)       Start:  04/17/24    Expected End:  05/01/24    Resolved:  04/18/24         STG - Patient will ambulate (Adequate for Discharge)       Start:  04/17/24    Expected End:  05/01/24    Resolved:  04/18/24    150 ft with close supervision using LRAD            PT Problem       PT Goal 1 (Adequate for Discharge)       Start:  04/17/24    Expected End:  05/01/24    Resolved:  04/18/24    Pt demonstrates IND in performing 2 sets of 12 reps of prescribed BLE HEP            PT Transfers       STG - Patient to transfer to and from sit to supine (Adequate for Discharge)       Start:  04/17/24    Expected End:  05/01/24    Resolved:  04/18/24    Mod IND using the log roll technique         STG - Patient will transfer sit to and from stand (Adequate for Discharge)       Start:  04/17/24    Expected End:  05/01/24    Resolved:  04/18/24    With close supervision using LRAD

## 2024-04-18 NOTE — PROGRESS NOTES
Trip Brown is a 63 y.o. male on day 1 of admission presenting with Spondylolisthesis.    Subjective   Patient is doing well this morning.  He is sitting in a chair.  Looks comfortable.  No leg pain.  He walked 21 feet yesterday.  Anticipating discharge home later today.       Objective     Physical Exam    Last Recorded Vitals  Blood pressure 125/85, pulse 60, temperature 37.1 °C (98.8 °F), resp. rate 18, height 1.829 m (6'), weight 87.2 kg (192 lb 3.9 oz), SpO2 95%.  Intake/Output last 3 Shifts:  I/O last 3 completed shifts:  In: 3221 (36.9 mL/kg) [P.O.:480; I.V.:1541 (17.7 mL/kg); IV Piggyback:1200]  Out: 1750 (20.1 mL/kg) [Urine:1750 (0.6 mL/kg/hr)]  Weight: 87.2 kg     Relevant Results      Scheduled medications  acetaminophen, 650 mg, oral, q6h  amantadine, 100 mg, oral, BID  atorvastatin, 40 mg, oral, Nightly  dilTIAZem CD, 240 mg, oral, Daily  docusate sodium, 100 mg, oral, BID  FLUoxetine, 60 mg, oral, Daily  tiotropium, 2 puff, inhalation, Daily   And  formoterol, 20 mcg, nebulization, q12h  lithium, 300 mg, oral, BID  methocarbamol, 500 mg, oral, 4x daily  metoprolol succinate XL, 25 mg, oral, Daily  OLANZapine, 7.5 mg, oral, Nightly  oxygen, , inhalation, Continuous - 02/gases  pantoprazole, 40 mg, oral, Daily before breakfast  tamsulosin, 0.4 mg, oral, Daily      Continuous medications  sodium chloride 0.9%, 100 mL/hr, Last Rate: 100 mL/hr (04/18/24 0407)      PRN medications  PRN medications: albuterol, benzocaine-menthol, diphenhydrAMINE, HYDROmorphone, ipratropium-albuteroL, ketorolac, magnesium citrate, naloxone, ondansetron **OR** ondansetron, oxyCODONE, oxyCODONE, prochlorperazine **OR** prochlorperazine  Results for orders placed or performed during the hospital encounter of 04/17/24 (from the past 24 hour(s))   CBC   Result Value Ref Range    WBC 12.5 (H) 4.4 - 11.3 x10*3/uL    nRBC 0.0 0.0 - 0.0 /100 WBCs    RBC 4.29 (L) 4.50 - 5.90 x10*6/uL    Hemoglobin 14.5 13.5 - 17.5 g/dL     Hematocrit 41.3 41.0 - 52.0 %    MCV 96 80 - 100 fL    MCH 33.8 26.0 - 34.0 pg    MCHC 35.1 32.0 - 36.0 g/dL    RDW 13.2 11.5 - 14.5 %    Platelets 229 150 - 450 x10*3/uL   Basic metabolic panel   Result Value Ref Range    Glucose 172 (H) 74 - 99 mg/dL    Sodium 136 136 - 145 mmol/L    Potassium 4.6 3.5 - 5.3 mmol/L    Chloride 108 (H) 98 - 107 mmol/L    Bicarbonate 18 (L) 21 - 32 mmol/L    Anion Gap 15 10 - 20 mmol/L    Urea Nitrogen 16 6 - 23 mg/dL    Creatinine 1.25 0.50 - 1.30 mg/dL    eGFR 64 >60 mL/min/1.73m*2    Calcium 9.5 8.6 - 10.3 mg/dL                            Assessment/Plan   Principal Problem:    Spondylolisthesis  Active Problems:    Lumbar radiculopathy    Postoperative day 1 status post L5-S1 anterior lumbar interbody fusion  Out of bed today with physical therapy  Plan for discharge home later today with home care  Follow-up in 2 to 3 weeks             Marvin Herr MD

## 2024-04-18 NOTE — CARE PLAN
Problem: Pain  Goal: My pain/discomfort is manageable  Outcome: Progressing     Problem: Safety  Goal: Patient will be injury free during hospitalization  Outcome: Progressing  Goal: I will remain free of falls  Outcome: Progressing     Problem: Daily Care  Goal: Daily care needs are met  Outcome: Progressing     Problem: Psychosocial Needs  Goal: Demonstrates ability to cope with hospitalization/illness  Outcome: Progressing  Goal: Collaborate with me, my family, and caregiver to identify my specific goals  Outcome: Progressing  Flowsheets (Taken 4/17/2024 2112)  Cultural Requests During Hospitalization: none  Spiritual Requests During Hospitalization: none     Problem: Discharge Barriers  Goal: My discharge needs are met  Outcome: Progressing

## 2024-04-18 NOTE — NURSING NOTE
Discharge instructions provided using teachback method.  Patient's health-related risk factors discussed with patient.  Patient educated to look for worsening signs and symptoms and educated to seek medical attention if experiencing medical emergency.  Patient aware of needs to follow up with outpatient clinics as scheduled. Home going meds reviewed with patient.  Patient verbalized understanding of disposition and discharge instructions.  All questions answered to patient's satisfaction and within nursing scope of practice.  Vitals stable; IV(s) removed.  Pt awaiting ride.

## 2024-04-18 NOTE — NURSING NOTE
Pt continually getting up by himself, not following safety instructions. Pt not using the walker and he is bumping into furniture and presents a high fall risk. But he stated I'm gonna do what I want to do. Will continue to educate.

## 2024-04-18 NOTE — NURSING NOTE
Interdisciplinary team present: NP, PT, NM, CC, SW, Orthopedic Coordinator, and bedside RN.  Pain - controlled  Nausea - none  Discharge barrier - further PT  Discharge plan - home with [facility]  Discharge date/time -  4/18/24

## 2024-04-18 NOTE — OP NOTE
L5-S1 Transabdominal Approach Anterior Lumbar Fusion; L5-S1 Posterior Instrumentation Operative Note     Date: 2024  OR Location: [facility] A OR    Name: Trip Brown, : 1960, Age: 63 y.o., MRN: [id number], Sex: male    Diagnosis  Pre-op Diagnosis     * Lumbar radiculopathy [M54.16]     * Spondylolisthesis, unspecified spinal region [M43.10] Post-op Diagnosis     * Lumbar radiculopathy [M54.16]     * Spondylolisthesis, unspecified spinal region [M43.10]     Procedures  L5-S1 Transabdominal Approach Anterior Lumbar Fusion; L5-S1 Posterior Instrumentation  88083 - GA ARTHRD ANT INTERBODY MIN DSC LUMBAR    Spinal Exposure  76523 - GA ARTHRD ANT INTERBODY MIN DSC LUMBAR    GA POSTERIOR NON-SEGMENTAL INSTRUMENTATION []  GA ALLOGRAFT FOR SPINE SURGERY ONLY MORSELIZED []  GA ALLOGRAFT FOR SPINE SURGERY ONLY STRUCTURAL []  Surgeons   Panel 1:     * Marvin Herr - Primary  Panel 2:     * Marvin Kwan - Primary    Resident/Fellow/Other Assistant:  Surgeons and Role:  Panel 1:     * Jossie Vasques PA-C - Assisting    Procedure Summary  Anesthesia: General  ASA: II  Anesthesia Staff: Anesthesiologist: Holland Rogers MD  CRNA: YUE Coronel-CNP, APRN-CRNA  Estimated Blood Loss: 50mL  Intra-op Medications:   Administrations occurring from 0730 to 1100 on 24:   Medication Name Total Dose   gelatin absorbable (Gelfoam) 100 sponge 1 each   thrombin (recombinant) (Recothrom) topical solution 10,000 Units   sodium chloride 0.9 % irrigation solution 1,000 mL   BUPivacaine-EPINEPHrine (Marcaine w/EPI) 0.5 %-1:200,000 injection 30 mL   oxygen (O2) therapy 140 L              Anesthesia Record               Intraprocedure I/O Totals          Intake    Neostigmine 6.00 mL    The total shown is the total volume documented since Anesthesia Start was filed.    LR bolus 1000.00 mL    Total Intake 1006 mL       Output    Urine 300 mL    Total Output 300 mL       Net    Net Volume 706 mL           Specimen: No specimens collected     Staff:   Circulator: Jenfifer Joel, CARMEN; Izabel Obregon, CARMEN  Scrub Person: Ellie Brea; Deejay Knapp, RN         Drains and/or Catheters:   [REMOVED] Urethral Catheter 166 Fr. (Removed)       Tourniquet Times:         Implants:  Implants       Type Name Action Serial No.      Graft INFUSE BMP EXTRA-SMALL - [id number] Implanted      Graft BONE CHIPS, CANCELL 15CC 1-4MM - [id number] - [id number] Implanted [id number]     Spinal Hardware PUTTY, ORTHOBLAST II 5ML - [id number] - [id number] Implanted [id number]     Screw WASHER, 13MM - SN/A - [id number] Implanted N/A      FRA Anterior, Narrow, Lordotic Implanted [id number]      SCREW SET, SOLERA VOYAGER, 5.5/6.0 - SN/A - [id number] Implanted N/A     Screw SCREW 5.5 VOYAGER MAS 7.5 X 55 Implanted N/A     Screw SCREW 5.5 VOYAGER MAS 7.5 X 50 Implanted N/A     Corrina CORRINA 5.5 TI AL PERC CORRINA 35MM Implanted N/A      CORRINA 5.5 TI AL PERC CORRINA 30MM Implanted N/A      6.5 TI CANCELLOUS SCREW FULLY THRD/30 Implanted NA              Findings:      Indications: Trip Brown is an 63 y.o. male who is having surgery for Lumbar radiculopathy [M54.16]  Spondylolisthesis, unspecified spinal region [M43.10].      The patient was seen in the preoperative area. The risks, benefits, complications, treatment options, non-operative alternatives, expected recovery and outcomes were discussed with the patient. The possibilities of reaction to medication, pulmonary aspiration, injury to surrounding structures, bleeding, recurrent infection, the need for additional procedures, failure to diagnose a condition, and creating a complication requiring transfusion or operation were discussed with the patient. The patient concurred with the proposed plan, giving informed consent.  The site of surgery was properly noted/marked if necessary per policy. The patient has been actively warmed in preoperative area. Preoperative antibiotics have been ordered and given  within 1 hours of incision. Venous thrombosis prophylaxis have been ordered including bilateral sequential compression devices    Procedure Details: Patient was brought to the OR suite placed in supine position administered general anesthetic with endotracheal tube intubation.  NG Hanson catheters are placed.  Intra-abdominal sterilely prepped draped sterile fashion.  Infraumbilical midline incision was made subcutaneous dissection undertaken electrocautery.  The fascia excised in the midline and the rectus muscle left was noted we entered the retrorectus space with no difficulty.  The peritoneum was retracted superiorly into the right.  Left psoas muscle is noted.  Left iliac artery and veins were also noted self-retaining was placed.  Adequate L5-S1 interspace dissection undertaken without event.  Middle sacral veins and arteries identified and ligated.  Excellent hemostasis noted.  Proceeded with discectomy cage placement and BMP.  Please see spine for specific implant details.  At the termination of procedure we reinspected the wound.  No other issues are noted.  Closed fascia with #1 looped PDS subcu 0 Vicryl sutures were placed as well as Monocryl for skin sterile dressing was applied spine proceeded with posterior approach.    Complications:  None; patient tolerated the procedure well.    Disposition: PACU - hemodynamically stable.  Condition: stable         Additional Details:      Attending Attestation: I was present and scrubbed for the entire procedure.    Marvin Herr  Phone Number: [phone]

## 2024-04-18 NOTE — DISCHARGE SUMMARY
Discharge Diagnosis  Spondylolisthesis    Issues Requiring Follow-Up  Patient should follow up at scheduled 2-3 week post up visit unless:  Uncontrolled bleeding from incision site  Intractable pain in extremities  Signs and symptoms of infection  Inability to urinate/have a bowel movement      Test Results Pending At Discharge  Pending Labs       No current pending labs.            Hospital Course  Patient was taken to the OR on 4/17/2024 for L5-S1 transabdominal approach to anterior lumbar fusion; L5-S1 posterior instrumentation. Procedure went as planned and without complications and patient was transferred to PACU and in stable condition was transferred to the floor. Patient has been working with PT and OT and improving. On discharge date patient was cleared by PT and orthopedic team and was determined safe for discharge.  He was discharged with home health care occupational therapy and physical therapy.  Daily discussion was had with the patient, nursing staff, orthopedic team, and family members if present. All questions were answered to the patient's satisfaction.      Pertinent Physical Exam At Time of Discharge  Physical Exam  Sitting up in chair.  Comfortable.  Denies radicular leg pain.  Able to move lower extremities freely without impairment.  Home Medications     Medication List      START taking these medications     acetaminophen 500 mg tablet; Commonly known as: Tylenol; Take 2 tablets   (1,000 mg) by mouth every 6 hours if needed for mild pain (1 - 3) for up   to 7 days.; Replaces: acetaminophen 650 mg ER tablet   cyclobenzaprine 10 mg tablet; Commonly known as: Flexeril; Take 1 tablet   (10 mg) by mouth 3 times a day as needed for muscle spasms for up to 10   days.   docusate sodium 100 mg capsule; Commonly known as: Colace; Take 1   capsule (100 mg) by mouth 3 times a day as needed for constipation for up   to 7 days.   ketorolac 10 mg tablet; Commonly known as: Toradol; Take 1 tablet (10   mg)  by mouth every 6 hours if needed for moderate pain (4 - 6) for up to 5   days.   oxyCODONE 5 mg immediate release tablet; Commonly known as: Roxicodone;   Take 1 tablet (5 mg) by mouth every 6 hours if needed for severe pain (7 -   10) for up to 7 days.     CONTINUE taking these medications     amantadine 100 mg capsule; Commonly known as: Symmetrel   aspirin 81 mg EC tablet   atorvastatin 40 mg tablet; Commonly known as: Lipitor   cholecalciferol 25 MCG (1000 UT) tablet; Commonly known as: Vitamin D-3   clindamycin 1 % external solution; Commonly known as: Cleocin T   dilTIAZem  mg 24 hr capsule; Commonly known as: Cardizem CD   FLUoxetine 60 mg tablet; Commonly known as: PROzac   fluticasone 50 mcg/actuation nasal spray; Commonly known as: Flonase   hydrOXYzine HCL 25 mg tablet; Commonly known as: Atarax   ipratropium-albuteroL 0.5-2.5 mg/3 mL nebulizer solution; Commonly known   as: Duo-Neb   lidocaine 5 % patch; Commonly known as: Lidoderm   lithium 300 mg capsule   metoprolol succinate XL 25 mg 24 hr tablet; Commonly known as: Toprol-XL   nicotine 21 mg/24 hr patch; Commonly known as: Nicoderm CQ; Place 1   patch over 24 hours on the skin once every 24 hours.   nicotine polacrilex 4 mg lozenge; Commonly known as: Commit; Dissolve 1   lozenge (4 mg) in the mouth every 2 hours if needed for smoking cessation.   OLANZapine 7.5 mg tablet; Commonly known as: ZyPREXA   pantoprazole 40 mg EC tablet; Commonly known as: ProtoNix   Stiolto Respimat 2.5-2.5 mcg/actuation mist inhaler; Generic drug:   tiotropium-olodateroL; Inhale 2 Inhalations once daily.   tamsulosin 0.4 mg 24 hr capsule; Commonly known as: Flomax; Take 1   capsule (0.4 mg) by mouth once daily.   tiotropium 18 mcg inhalation capsule; Commonly known as: Spiriva   traZODone 100 mg tablet; Commonly known as: Desyrel   triamcinolone 0.1 % cream; Commonly known as: Kenalog   Ventolin HFA 90 mcg/actuation inhaler; Generic drug: albuterol   Voltaren 1 %  gel; Generic drug: diclofenac sodium     STOP taking these medications     acetaminophen 650 mg ER tablet; Commonly known as: Tylenol 8 HOUR;   Replaced by: acetaminophen 500 mg tablet   baclofen 10 mg tablet; Commonly known as: Lioresal   meloxicam 15 mg tablet; Commonly known as: Mobic   naproxen sodium 550 mg tablet; Commonly known as: Anaprox       Outpatient Follow-Up  Future Appointments   Date Time Provider Department Center   4/23/2024  1:30 PM Juan Norton MD [facility]   5/8/2024  1:00 PM [facility]   7/11/2024  1:00 PM Sarahi Viera MD [id number] [facility]       Henry Rosen PA-C  7:29 AM  04/18/24

## 2024-04-18 NOTE — HOSPITAL COURSE
Patient was taken to the OR on 4/17/2024 for L5-S1 transabdominal approach to anterior lumbar fusion; L5-S1 posterior instrumentation. Procedure went as planned and without complications and patient was transferred to PACU and in stable condition was transferred to the floor. Patient has been working with PT and OT and improving. On discharge date patient was cleared by PT and orthopedic team and was determined safe for discharge.  He was discharged with home health care occupational therapy and physical therapy.  Daily discussion was had with the patient, nursing staff, orthopedic team, and family members if present. All questions were answered to the patient's satisfaction.

## 2024-04-22 ENCOUNTER — TELEPHONE (OUTPATIENT)
Dept: INPATIENT UNIT | Facility: HOSPITAL | Age: 64
End: 2024-04-22

## 2024-04-25 DIAGNOSIS — Z98.1 STATUS POST LUMBAR SPINAL FUSION: Primary | ICD-10-CM

## 2024-04-25 RX ORDER — PREDNISONE 10 MG/1
TABLET ORAL DAILY
Qty: 35 TABLET | Refills: 0 | Status: SHIPPED | OUTPATIENT
Start: 2024-04-25 | End: 2024-05-10

## 2024-04-29 ENCOUNTER — TRANSCRIBE ORDERS (OUTPATIENT)
Dept: ORTHOPEDIC SURGERY | Facility: HOSPITAL | Age: 64
End: 2024-04-29

## 2024-04-29 DIAGNOSIS — M54.16 LUMBAR RADICULOPATHY: ICD-10-CM

## 2024-04-30 ENCOUNTER — OFFICE VISIT (OUTPATIENT)
Dept: ORTHOPEDIC SURGERY | Facility: CLINIC | Age: 64
End: 2024-04-30
Payer: COMMERCIAL

## 2024-04-30 ENCOUNTER — HOSPITAL ENCOUNTER (OUTPATIENT)
Dept: RADIOLOGY | Facility: CLINIC | Age: 64
Discharge: HOME | End: 2024-04-30
Payer: COMMERCIAL

## 2024-04-30 VITALS — HEIGHT: 72 IN | BODY MASS INDEX: 26.01 KG/M2 | WEIGHT: 192 LBS

## 2024-04-30 DIAGNOSIS — Z98.1 STATUS POST LUMBAR SPINAL FUSION: ICD-10-CM

## 2024-04-30 DIAGNOSIS — M54.16 LUMBAR RADICULOPATHY: ICD-10-CM

## 2024-04-30 DIAGNOSIS — M54.50 LUMBAR BACK PAIN: Primary | ICD-10-CM

## 2024-04-30 PROCEDURE — 99024 POSTOP FOLLOW-UP VISIT: CPT

## 2024-04-30 PROCEDURE — 72100 X-RAY EXAM L-S SPINE 2/3 VWS: CPT | Performed by: STUDENT IN AN ORGANIZED HEALTH CARE EDUCATION/TRAINING PROGRAM

## 2024-04-30 PROCEDURE — 72100 X-RAY EXAM L-S SPINE 2/3 VWS: CPT

## 2024-04-30 RX ORDER — ACETAMINOPHEN 500 MG
1000 TABLET ORAL EVERY 6 HOURS PRN
Qty: 56 TABLET | Refills: 0 | Status: SHIPPED | OUTPATIENT
Start: 2024-04-30 | End: 2024-05-07

## 2024-04-30 RX ORDER — CYCLOBENZAPRINE HCL 10 MG
10 TABLET ORAL 3 TIMES DAILY PRN
Qty: 30 TABLET | Refills: 0 | Status: SHIPPED | OUTPATIENT
Start: 2024-04-30 | End: 2024-05-10

## 2024-04-30 ASSESSMENT — PAIN - FUNCTIONAL ASSESSMENT: PAIN_FUNCTIONAL_ASSESSMENT: 0-10

## 2024-04-30 NOTE — PROGRESS NOTES
HPI:  Trip is a pleasant 63-year-old male who presents today for initial postoperative visit for L5-S1 transabdominal approach anterior lumbar fusion; L5-S1 posterior instrumentation with Dr. Herr on 4/17/2024.  Overall, he is doing well.  He has been ambulating as tolerated.  He has some coming and going radicular leg pain on the left side that started about 3 days ago.  The majority of his right leg symptoms have improved.  He would like to start physical therapy soon.  He would like refills of his muscle relaxer and Tylenol.    ROS:  Reviewed on EMR and patient intake sheet.    PMH/SH:  Reviewed on EMR and patient intake sheet.    Exam:  MSK: Full strength range of motion of lower extremities bilaterally.  Abdominal incision visualized and appears well-healing and without signs of infection, erythema, drainage.  Lumbar incision visualized and sutures removed in office.  Appear clean without signs of infection, erythema, drainage.  General: No acute distress. Awake and conversant.  Eyes: Normal conjunctiva, anicteric. Round symmetric pupils.  ENT: Hearing grossly intact. No nasal discharge.  Neck: Neck is supple. No masses or thyromegaly.  Respiratory: Respirations are non-labored. No wheezing.  Skin: Warm. No rashes or ulcers.  Psych: Alert and oriented. Cooperative, appropriate mood and affect, normal judgement.  CV: No lower extremity edema.  Neuro: Sensation and CN II-XII grossly normal.    Radiology:     Lumbar x-rays personally reviewed and demonstrate L5-S1 anterior lumbar fusion with posterior instrumentation seated appropriately.  No signs of hardware failure or malalignment.    Diagnosis:    Status post lumbar fusion    Assessment and Plan:  Patient was seen today for initial visit following lumbar fusion. Overall, the patient is doing well. I recommended to continue walking as tolerated and to limit excessive bending, twisting, and lifting for the next 1-2 months. Patient should avoid NSAIDs for 2  additional months as this may decrease the efficacy of the fusion. We discussed the normal length of time for recovery from this type of surgery and that back/leg pain or numbness and tingling may come and go for a few weeks/months.  I refilled his muscle relaxers, Tylenol, and referred him to physical therapy today.  Patient feels that all questions were appropriately answered at time of visit. Patient may follow up in 1 year for final images, or sooner if needed. Patient agrees to the plan above.      This note was dictated using speech recognition software and was not corrected for spelling or grammatical errors    Henry Rosen PA-C  Department of Orthopaedic Surgery    [address] [place] [address]    Voicemail: [phone]   Appts: [phone]  Fax: [phone]

## 2024-05-07 DIAGNOSIS — M47.26 OSTEOARTHRITIS OF SPINE WITH RADICULOPATHY, LUMBAR REGION: Primary | ICD-10-CM

## 2024-05-07 RX ORDER — GABAPENTIN 300 MG/1
300 CAPSULE ORAL 3 TIMES DAILY
Qty: 90 CAPSULE | Refills: 2 | Status: SHIPPED | OUTPATIENT
Start: 2024-05-07 | End: 2024-08-05

## 2024-05-17 ENCOUNTER — EVALUATION (OUTPATIENT)
Dept: PHYSICAL THERAPY | Facility: HOSPITAL | Age: 64
End: 2024-05-17
Payer: COMMERCIAL

## 2024-05-17 DIAGNOSIS — M54.16 LUMBAR RADICULOPATHY: Primary | ICD-10-CM

## 2024-05-17 DIAGNOSIS — Z98.1 S/P LUMBAR SPINAL FUSION: ICD-10-CM

## 2024-05-17 PROCEDURE — 97110 THERAPEUTIC EXERCISES: CPT | Mod: GP | Performed by: PHYSICAL THERAPIST

## 2024-05-17 PROCEDURE — 97161 PT EVAL LOW COMPLEX 20 MIN: CPT | Mod: GP | Performed by: PHYSICAL THERAPIST

## 2024-05-17 ASSESSMENT — ENCOUNTER SYMPTOMS
DEPRESSION: 0
LOSS OF SENSATION IN FEET: 0
OCCASIONAL FEELINGS OF UNSTEADINESS: 1

## 2024-05-17 NOTE — PROGRESS NOTES
Physical Therapy Evaluation and Treatment      Patient Name: Trip Brown  MRN: [id number]  Today's Date: 5/17/2024  Time Calculation  Start Time: 0955  Stop Time: 1035  Time Calculation (min): 40 min      Assessment:   64 yo male with lumbar radiculopathy, currently s/p L5-S1 transabdominal approach anterior lumbar fusion; L5-S1 posterior instrumentation DOS 4/17/24, post op doing well, still has some radicular sx to B LE, decreased mobility as expected post op, pt is educated in post op precautions, gentle mobility training, HEP education and is in agreement of plan.     Plan:  1x/week 12 weeks to work on education of post op precautions, lumbar stab program and conditioning program. Modalities prn.   S/p L5-S1 fusion DOS 4/17/24    PT Plan: Skilled PT  PT Frequency: 1 time per week  Duration: 12 weeks  Onset Date: 04/17/24  Certification Period Start Date: 05/17/24  Certification Period End Date: 08/15/24  Number of Treatments Authorized: [facility], [place]  Rehab Potential: Good  Plan of Care Agreement: Patient    Current Problem:   1. Lumbar radiculopathy  Referral to Physical Therapy    Follow Up In Physical Therapy      2. S/P lumbar spinal fusion  Follow Up In Physical Therapy          Subjective    General:  General  Reason for Referral: s/p  L5-S1 transabdominal approach anterior lumbar fusion; L5-S1 posterior instrumentation DOS 4/17/24  Referred By: Henry Rosen  Past Medical History Relevant to Rehab: Asthma, bipolar disorder, CKD, COPD, polysubstance abuse, SVT, thrombocytopenia  Precautions:  Precautions  STEADI Fall Risk Score (The score of 4 or more indicates an increased risk of falling): 6  Post-Surgical Precautions: Spinal precautions    HPI: pt has hx of lumbar radiculopathy R LE, underwent lumbar fusion L5-S1 DOS 4/17/24, post op doing well, still taking baclofen, tylenol for pain control, no home PT, discharged to home and now referred to OP PT.   Pt states since surgery. Feeling offset pain  R side lower back to R LE, pain also going down L LE,  as pain. No tripping over, R knee gives out sometimes.   Pt states weight loss of 15# over the past month, decreased appetite and mobility. Sleeping in regular bed.      IMAGING: post op xrays.       PMH: asthma, COPD, HA, CKD,     MEDICATIONS: taking baclofen and tylenol for pain control.     SOCIAL HX/JOB STATUS: lives with family, independent with ADLs, stairs at home (much difficulty, BR basement), on disability, walking with cane intermittently,     BASELINE FUNCTION:  walking intermittently, 2-3x/ week, 1/4miles, feels fatigued after with pain, using cane intermittently.  Has cane and walker at home.     PAIN: current 6/10 worst      Best 3/10  description and location of pain: across lower back and radiating pain to R and L LE lateral and post to calf area.    Pain aggravated by: bending, prolonged sitting, standing and walking, sleeping (sidelying) waking up from pain.   Pain relieved by: pain meds, change positions.     Vital Signs:     Pain:     Home Living:     Prior Level of Function:       Objective   OBSERVATION: incision well healed, not sensitive to palpation.     PALPATION: WNL along incision    SENSATION: WNL    Lumbar ROM: *Pain NT post op  Flexion  Extension  SB R            L  Rotation R            L              Hip screening: cleared    MYOTOMES: WNL B LE    FUNCTIONAL STRENGTH:  Able to heel walk,  toe walk, double leg squat, L side limited due to hx of L ankle injury with difficulty L WB.   Balance SLS with min HHA 10sec R, 5sec L.   Gait amb on level with short steps, no device.     SPECIAL TEST:  Pelvis alignment: even   SLR neg cross SLR neg    Outcome Measures:  Other Measures  Oswestry Disablity Index (ZELALEM): 64%     Treatments:  Treatment provided today: 15min  Educated patient on evaluation findings and POC, expectations and course of PT, questions addressed.   HEP: reviewed with pt post op precautions, log roll technique, no  bending, twisting or heavy lifting, instructed in HEP to work on PPT, beginner bridge, SLR, sidelying hip abd, sit to stand, SLS, and double leg heel raises, encouraged walking as able, handout issued, pt verbalized understanding.   Activity:      OP EDUCATION:       Goals:  To be met at time of discharge:  -- Decrease pain to _<4/10__.  -- Independent with HEP.  -- ROM: able to tolerate L spine ROM with no increased pain 50% range.   -- no radicular sx to B LE with daily activities   -- Functional outcome: able to tolerate sleeping without waking up from pain.  Able to tolerate daily walking program x20-30 min with pain <4/10 after.   Able to tolerate gentle lumbar stab program 25min in clinic with no increased pain.  Decrease oswestry to <40% or better.

## 2024-05-23 DIAGNOSIS — N40.1 BENIGN PROSTATIC HYPERPLASIA WITH LOWER URINARY TRACT SYMPTOMS, SYMPTOM DETAILS UNSPECIFIED: ICD-10-CM

## 2024-05-23 RX ORDER — TAMSULOSIN HYDROCHLORIDE 0.4 MG/1
0.4 CAPSULE ORAL DAILY
Qty: 30 CAPSULE | Refills: 11 | Status: SHIPPED | OUTPATIENT
Start: 2024-05-23 | End: 2025-05-18

## 2024-05-30 ENCOUNTER — HOSPITAL ENCOUNTER (OUTPATIENT)
Dept: RADIOLOGY | Facility: HOSPITAL | Age: 64
Discharge: HOME | End: 2024-05-30
Payer: COMMERCIAL

## 2024-05-30 DIAGNOSIS — M25.561 PAIN IN RIGHT KNEE: ICD-10-CM

## 2024-05-30 DIAGNOSIS — N40.1 BENIGN PROSTATIC HYPERPLASIA WITH LOWER URINARY TRACT SYMPTOMS, SYMPTOM DETAILS UNSPECIFIED: ICD-10-CM

## 2024-05-30 PROCEDURE — 76770 US EXAM ABDO BACK WALL COMP: CPT | Performed by: RADIOLOGY

## 2024-05-30 PROCEDURE — 73562 X-RAY EXAM OF KNEE 3: CPT | Mod: RT

## 2024-05-30 PROCEDURE — 73562 X-RAY EXAM OF KNEE 3: CPT | Mod: RIGHT SIDE | Performed by: RADIOLOGY

## 2024-05-30 PROCEDURE — 76770 US EXAM ABDO BACK WALL COMP: CPT

## 2024-06-04 ENCOUNTER — DOCUMENTATION (OUTPATIENT)
Dept: PHYSICAL THERAPY | Facility: HOSPITAL | Age: 64
End: 2024-06-04
Payer: MEDICAID

## 2024-06-06 ENCOUNTER — APPOINTMENT (OUTPATIENT)
Dept: ORTHOPEDIC SURGERY | Facility: CLINIC | Age: 64
End: 2024-06-06
Payer: MEDICAID

## 2024-06-10 ENCOUNTER — OFFICE VISIT (OUTPATIENT)
Dept: ORTHOPEDIC SURGERY | Facility: CLINIC | Age: 64
End: 2024-06-10
Payer: MEDICAID

## 2024-06-10 VITALS — BODY MASS INDEX: 24.92 KG/M2 | WEIGHT: 184 LBS | HEIGHT: 72 IN

## 2024-06-10 DIAGNOSIS — M25.461 EFFUSION OF RIGHT KNEE: ICD-10-CM

## 2024-06-10 DIAGNOSIS — M17.11 ARTHRITIS OF RIGHT KNEE: Primary | ICD-10-CM

## 2024-06-10 DIAGNOSIS — M25.561 PAIN IN RIGHT KNEE: ICD-10-CM

## 2024-06-10 PROCEDURE — 20610 DRAIN/INJ JOINT/BURSA W/O US: CPT

## 2024-06-10 PROCEDURE — 99203 OFFICE O/P NEW LOW 30 MIN: CPT

## 2024-06-10 RX ORDER — LIDOCAINE HYDROCHLORIDE 20 MG/ML
2 INJECTION, SOLUTION INFILTRATION; PERINEURAL
Status: COMPLETED | OUTPATIENT
Start: 2024-06-10 | End: 2024-06-10

## 2024-06-10 RX ORDER — TRIAMCINOLONE ACETONIDE 40 MG/ML
40 INJECTION, SUSPENSION INTRA-ARTICULAR; INTRAMUSCULAR
Status: COMPLETED | OUTPATIENT
Start: 2024-06-10 | End: 2024-06-10

## 2024-06-10 RX ADMIN — TRIAMCINOLONE ACETONIDE 40 MG: 40 INJECTION, SUSPENSION INTRA-ARTICULAR; INTRAMUSCULAR at 16:29

## 2024-06-10 RX ADMIN — LIDOCAINE HYDROCHLORIDE 2 ML: 20 INJECTION, SOLUTION INFILTRATION; PERINEURAL at 16:29

## 2024-06-10 ASSESSMENT — ENCOUNTER SYMPTOMS: KNEE SWELLING: 1

## 2024-06-10 NOTE — PROGRESS NOTES
Subjective    Patient ID: Trip Brown is a 63 y.o. male.    Chief Complaint: Pain of the Right Knee    Right Knee       This is a pleasant 63-year-old male presenting to the office with his sister for evaluation of right knee pain and swelling, which has been ongoing for years.  There has been no recent injury.  He points to anterior aspect of knee when describing the pain.  Pain hinders his activities of daily living including prolonged walking standing and stair climbing.  He does have significant difficulty going up and down stairs and getting in and out of a car.  He has noticed increased swelling over the last few weeks without warmth.  Swelling causes limited range of motion and sensations of knee giving out and buckling.  He experiences cracking and popping over his kneecap.  He has been taking Tylenol, muscle relaxers, applying heat and Aspercreme without any relief of symptoms.  He does occasionally use a knee sleeve.  He has not been seen for his knees in the past.  He does not work, currently on disability.    The patient's past medical, surgical, family, and social history as well as allergies and medications were reviewed and updated in the chart.    Objective   Ortho Exam  Pleasant and no acute distress. Walks with a mildly antalgic gait.  Right knee appearing without soft tissue swelling erythema or ecchymosis.  There is no warmth upon touch.  Skin is intact.  Right knee range of motion is 5-110°. The knee is stable to varus and valgus stress Lachman and posterior drawer. There is a moderate effusion. There is generalized tenderness. Left knee range of motion is 0-120°. There is no effusion. The knee is stable to varus and valgus stress Lachman and posterior drawer. Both lower extremities are well perfused the skin is intact and muscle tone is adequate.    Image Results:  XR knee right 3 views  Narrative: Interpreted By:  Shiva Da Silva,   STUDY:  XR KNEE RIGHT 3 VIEWS; ;  5/30/2024 2:43  pm      INDICATION:  Signs/Symptoms:pain.      COMPARISON:  None.      ACCESSION NUMBER(S):  [id number]      ORDERING CLINICIAN:  DILMA DEL CASTILLO      FINDINGS:  Right knee, three views      There is tricompartmental osteophytosis and sclerosis worse in the  lateral compartment. A small effusion is present. There is no  fracture or dislocation      Impression: Mild degenerative changes worse in the lateral compartment          MACRO:  None      Signed by: Shiva Da Silva 6/1/2024 10:23 AM  Dictation workstation:   [id number]    Multiple view x-rays of the right knee obtained on June 1, 2024 personally reviewed, with moderate degenerative changes of the right knee noted.  There is lateral and patellofemoral joint space narrowing with osteophyte formation noted.  No evidence of acute fracture or dislocation.    Assessment/Plan   Encounter Diagnoses: Right knee pain and effusion, right knee arthritis    Plan: Discussion with patient about diagnosis with review of conservative and surgical treatment options.  Patient is aware that in the future he may be a candidate for a right knee replacement, but we will always begin with conservative treatment options.  After the risk and benefits of a right knee aspiration and injection were discussed, patient agreed to injection/aspiration and tolerated well.  Approximately 50 cc of yellow straw-colored fluid were aspirated without difficulty.  He will monitor benefit of injections.  He is aware injections could take up to 2 full weeks to take full effect.  He can receive these injections every 3 months as needed.  We also discussed viscosupplementation gel injections in the future if cortisone is not of significant benefit.  He can continue with Tylenol and topical creams, as well as heat and ice application.  He will follow-up as symptoms dictate.    L Inj/Asp: R knee on 6/10/2024 4:29 PM  Indications: pain  Details: 22 G needle, superolateral approach  Medications: 40 mg  triamcinolone acetonide 40 mg/mL; 2 mL lidocaine 20 mg/mL (2 %)  Procedure, treatment alternatives, risks and benefits explained, specific risks discussed. Consent was given by the patient.       L Inj/Asp: R knee on 6/10/2024 4:29 PM  Details: 18 G needle, superolateral approach  Aspirate: 50 mL yellow and clear  Outcome: tolerated well, no immediate complications  Procedure, treatment alternatives, risks and benefits explained, specific risks discussed. Consent was given by the patient.

## 2024-06-21 ENCOUNTER — DOCUMENTATION (OUTPATIENT)
Dept: PHYSICAL THERAPY | Facility: HOSPITAL | Age: 64
End: 2024-06-21
Payer: MEDICAID

## 2024-06-24 ENCOUNTER — APPOINTMENT (OUTPATIENT)
Dept: ORTHOPEDIC SURGERY | Facility: CLINIC | Age: 64
End: 2024-06-24

## 2024-06-25 ENCOUNTER — APPOINTMENT (OUTPATIENT)
Dept: ORTHOPEDIC SURGERY | Facility: CLINIC | Age: 64
End: 2024-06-25

## 2024-07-01 ENCOUNTER — TELEPHONE (OUTPATIENT)
Dept: ORTHOPEDIC SURGERY | Facility: CLINIC | Age: 64
End: 2024-07-01
Payer: COMMERCIAL

## 2024-07-01 NOTE — TELEPHONE ENCOUNTER
Trip Izaguirre called and is inquiring on starting the process for the Gel injections. Are we okay to proceed with this?     Thank you.

## 2024-07-03 DIAGNOSIS — M17.11 ARTHRITIS OF RIGHT KNEE: ICD-10-CM

## 2024-07-03 RX ORDER — HYALURONATE SODIUM 16.8MG/2ML
16.8 SYRINGE (ML) INTRAARTICULAR
Qty: 6 ML | Refills: 0 | Status: SHIPPED | OUTPATIENT
Start: 2024-07-07 | End: 2024-07-22

## 2024-07-05 ENCOUNTER — DOCUMENTATION (OUTPATIENT)
Dept: PHYSICAL THERAPY | Facility: HOSPITAL | Age: 64
End: 2024-07-05
Payer: COMMERCIAL

## 2024-07-05 NOTE — PROGRESS NOTES
Physical Therapy    Discharge Summary    Name: Trip Brown  MRN: [id number]  : 1960  Date: 24    Discharge Summary: PT    Discharge Information: Date of discharge 24, Date of evaluation 24, and Number of attended visits 1      Rehab Discharge Reason: Failed to schedule and/or keep follow-up appointment(s), multiple NS, attended initial evaluation only.

## 2024-07-07 DIAGNOSIS — M47.26 OSTEOARTHRITIS OF SPINE WITH RADICULOPATHY, LUMBAR REGION: ICD-10-CM

## 2024-07-08 RX ORDER — GABAPENTIN 300 MG/1
300 CAPSULE ORAL 3 TIMES DAILY
Qty: 90 CAPSULE | Refills: 2 | Status: SHIPPED | OUTPATIENT
Start: 2024-07-08 | End: 2024-10-06

## 2024-07-11 ENCOUNTER — APPOINTMENT (OUTPATIENT)
Dept: PULMONOLOGY | Facility: CLINIC | Age: 64
End: 2024-07-11
Payer: MEDICAID

## 2024-08-20 ENCOUNTER — OFFICE VISIT (OUTPATIENT)
Dept: ORTHOPEDIC SURGERY | Facility: CLINIC | Age: 64
End: 2024-08-20
Payer: COMMERCIAL

## 2024-08-20 DIAGNOSIS — M17.11 ARTHRITIS OF RIGHT KNEE: Primary | ICD-10-CM

## 2024-08-20 PROCEDURE — 2500000004 HC RX 250 GENERAL PHARMACY W/ HCPCS (ALT 636 FOR OP/ED): Mod: JZ

## 2024-08-20 PROCEDURE — 20610 DRAIN/INJ JOINT/BURSA W/O US: CPT | Mod: RT

## 2024-08-20 PROCEDURE — 99211 OFF/OP EST MAY X REQ PHY/QHP: CPT | Mod: 25

## 2024-08-20 NOTE — PROGRESS NOTES
Subjective    Patient ID: Trip Brown is a 63 y.o. male.    Chief Complaint: Follow-up of the Right Knee     L Inj/Asp: R knee on 8/20/2024 2:09 PM  Indications: pain (Arthritis )  Details: 22 G needle, superolateral approach  Medications: 16.8 mg sodium hyaluronate 16.8 mg/2 mL  Procedure, treatment alternatives, risks and benefits explained, specific risks discussed. Consent was given by the patient.          Assessment/Plan   Encounter Diagnoses:  Arthritis of right knee

## 2024-08-27 ENCOUNTER — OFFICE VISIT (OUTPATIENT)
Dept: ORTHOPEDIC SURGERY | Facility: CLINIC | Age: 64
End: 2024-08-27
Payer: COMMERCIAL

## 2024-08-27 DIAGNOSIS — M17.11 ARTHRITIS OF RIGHT KNEE: Primary | ICD-10-CM

## 2024-08-27 PROCEDURE — 99211 OFF/OP EST MAY X REQ PHY/QHP: CPT | Mod: 25

## 2024-08-27 PROCEDURE — 20610 DRAIN/INJ JOINT/BURSA W/O US: CPT | Mod: RT

## 2024-08-27 PROCEDURE — 2500000004 HC RX 250 GENERAL PHARMACY W/ HCPCS (ALT 636 FOR OP/ED): Mod: JZ

## 2024-08-27 NOTE — PROGRESS NOTES
Subjective    Patient ID: Trip Brown is a 63 y.o. male.    Chief Complaint: OTHER (GELSYN-3 INJECTION 16.8MG/2ML #2 RT KNEE/PATIENT SUPPLIED MEDICATION )     L Inj/Asp: R knee on 8/27/2024 12:11 PM  Indications: pain (Arthritis )  Details: 22 G needle, superolateral approach  Medications: 16.8 mg sodium hyaluronate 16.8 mg/2 mL  Procedure, treatment alternatives, risks and benefits explained, specific risks discussed. Consent was given by the patient.          Assessment/Plan   Encounter Diagnoses:  Arthritis of right knee

## 2024-09-03 ENCOUNTER — APPOINTMENT (OUTPATIENT)
Dept: ORTHOPEDIC SURGERY | Facility: CLINIC | Age: 64
End: 2024-09-03
Payer: COMMERCIAL

## 2024-09-03 ENCOUNTER — OFFICE VISIT (OUTPATIENT)
Dept: ORTHOPEDIC SURGERY | Facility: CLINIC | Age: 64
End: 2024-09-03
Payer: COMMERCIAL

## 2024-09-03 DIAGNOSIS — M17.11 ARTHRITIS OF RIGHT KNEE: Primary | ICD-10-CM

## 2024-09-03 DIAGNOSIS — G89.29 CHRONIC PAIN OF LEFT KNEE: ICD-10-CM

## 2024-09-03 DIAGNOSIS — M25.562 CHRONIC PAIN OF LEFT KNEE: ICD-10-CM

## 2024-09-03 PROCEDURE — 20610 DRAIN/INJ JOINT/BURSA W/O US: CPT | Mod: RT

## 2024-09-03 PROCEDURE — 99211 OFF/OP EST MAY X REQ PHY/QHP: CPT | Mod: 25

## 2024-09-03 PROCEDURE — 2500000004 HC RX 250 GENERAL PHARMACY W/ HCPCS (ALT 636 FOR OP/ED): Mod: JZ

## 2024-09-03 NOTE — PROGRESS NOTES
Subjective    Patient ID: Trip Brown is a 63 y.o. male.    Chief Complaint: OTHER (GELSYN-3 INJECTION 16.8MG/2ML/PATIENT SUPPLIED MEDICATION)     L Inj/Asp: R knee on 9/3/2024 5:10 PM  Indications: pain (Arthritis )  Details: 22 G needle, superolateral approach  Medications: 16.8 mg sodium hyaluronate 16.8 mg/2 mL  Procedure, treatment alternatives, risks and benefits explained, specific risks discussed. Consent was given by the patient.          Assessment/Plan   Encounter Diagnoses: right knee arthritis    Left knee pain     Plan: Patient received his third injection in a series of 3 Gelsyn injections for the right knee.  While at this appointment, patient also complained of left knee pain.  Left knee x-rays were ordered, and he can return to the office for a left knee injection.  He is also interested in left knee gel injections in the future, as he has already noticed positive improvement from right knee gel injections.

## 2024-09-09 ENCOUNTER — HOSPITAL ENCOUNTER (OUTPATIENT)
Dept: RADIOLOGY | Facility: HOSPITAL | Age: 64
Discharge: HOME | End: 2024-09-09
Payer: COMMERCIAL

## 2024-09-09 DIAGNOSIS — G89.29 CHRONIC PAIN OF LEFT KNEE: ICD-10-CM

## 2024-09-09 DIAGNOSIS — M25.562 CHRONIC PAIN OF LEFT KNEE: ICD-10-CM

## 2024-09-09 PROCEDURE — 73564 X-RAY EXAM KNEE 4 OR MORE: CPT | Mod: LEFT SIDE | Performed by: RADIOLOGY

## 2024-09-09 PROCEDURE — 73564 X-RAY EXAM KNEE 4 OR MORE: CPT | Mod: LT

## 2024-09-23 ENCOUNTER — APPOINTMENT (OUTPATIENT)
Dept: ORTHOPEDIC SURGERY | Facility: CLINIC | Age: 64
End: 2024-09-23
Payer: COMMERCIAL

## 2024-09-26 ENCOUNTER — OFFICE VISIT (OUTPATIENT)
Dept: ORTHOPEDIC SURGERY | Facility: CLINIC | Age: 64
End: 2024-09-26
Payer: COMMERCIAL

## 2024-09-26 DIAGNOSIS — G89.29 CHRONIC PAIN OF LEFT KNEE: ICD-10-CM

## 2024-09-26 DIAGNOSIS — M25.562 CHRONIC PAIN OF LEFT KNEE: ICD-10-CM

## 2024-09-26 DIAGNOSIS — M17.12 ARTHRITIS OF LEFT KNEE: Primary | ICD-10-CM

## 2024-09-26 PROCEDURE — 20610 DRAIN/INJ JOINT/BURSA W/O US: CPT | Mod: LT

## 2024-09-26 PROCEDURE — 2500000004 HC RX 250 GENERAL PHARMACY W/ HCPCS (ALT 636 FOR OP/ED)

## 2024-09-26 PROCEDURE — 99213 OFFICE O/P EST LOW 20 MIN: CPT

## 2024-09-26 PROCEDURE — 2500000005 HC RX 250 GENERAL PHARMACY W/O HCPCS

## 2024-09-26 RX ORDER — LIDOCAINE HYDROCHLORIDE 20 MG/ML
2 INJECTION, SOLUTION INFILTRATION; PERINEURAL
Status: COMPLETED | OUTPATIENT
Start: 2024-09-26 | End: 2024-09-26

## 2024-09-26 RX ORDER — TRIAMCINOLONE ACETONIDE 40 MG/ML
40 INJECTION, SUSPENSION INTRA-ARTICULAR; INTRAMUSCULAR
Status: COMPLETED | OUTPATIENT
Start: 2024-09-26 | End: 2024-09-26

## 2024-09-26 ASSESSMENT — ENCOUNTER SYMPTOMS: KNEE DEFORMITY: 1

## 2024-09-26 NOTE — PROGRESS NOTES
Subjective    Patient ID: Trip Brown is a 63 y.o. male.    Chief Complaint: Follow-up of the Left Knee    Left Knee       This is a pleasant 63-year-old male presenting to the office for follow-up of left knee pain.  There is been no injury.  He points to his knee And medial aspect of knee when describing the pain.  He recently underwent cortisone and gel injections for his right knee which were of benefit, so patient presents to the office today hoping to proceed with the same treatment.  Pain is a constant dull ache.  Pain hinders his activities of daily living including prolonged standing walking and stair climbing.  He has difficulty going up and down stairs and getting in and out of a car.  He has not noticed any significant swelling or limited range of motion of the left knee.  He will occasionally have mechanical symptoms of knee buckling or giving out.  He experiences cracking and popping over his kneecap.  He has been taking Tylenol, muscle relaxers and applying heat and Aspercreme with minimal relief of symptoms.  He does occasionally use a knee sleeve.  Patient does not work, currently on disability.    The patient's past medical, surgical, family, and social history as well as allergies and medications were reviewed and updated in the chart.    Objective   Ortho Exam  Pleasant and no acute distress. Walks with a mildly antalgic gait.  Left knee appearing without soft tissue swelling erythema or ecchymosis. There is no warmth upon touch. Skin is intact. Right knee range of motion is 5-110°. The knee is stable to varus and valgus stress Lachman and posterior drawer. There is a mild effusion. There is generalized tenderness. Left knee range of motion is 3-115 °. There is no effusion. The knee is stable to varus and valgus stress Lachman and posterior drawer. Both lower extremities are well perfused the skin is intact and muscle tone is adequate.     Image Results:  XR knee left 4+ views  Narrative:  Interpreted By:  Ya Villarreal,   STUDY:  Left knee, four views.      INDICATION:  Signs/Symptoms:left knee pain.      COMPARISON:  None.      ACCESSION NUMBER(S):  [id number]      ORDERING CLINICIAN:  TREVON SALAS      FINDINGS:  Left knee:      No acute fracture or malalignment.      Severe medial compartment osteoarthrosis with joint space loss and  osteophytes. Mild lateral and patellofemoral compartment  osteoarthrosis with osteophytes.      No significant knee joint effusion.  Soft tissues are unremarkable.      Impression: 1. Severe medial and mild lateral/patellofemoral compartment  osteoarthrosis of the left knee.      MACRO:  None.      Signed by: Ya Villarreal 9/10/2024 7:35 PM  Dictation workstation:   [id number]      Assessment/Plan   Encounter Diagnoses:  Arthritis of left knee    Chronic pain of left knee    Plan: Discussion with patient in regards to left knee arthritis with review of conservative and surgical treatment options.  Patient is aware that he may be a candidate in the future for a left or right knee replacement, but would like to continue with conservative treatment options as they have been of benefit for his right knee.  After the risks and benefits of the left knee cortisone injection of Kenalog/lidocaine was discussed, patient agreed to injection and tolerated well.  He would also like to proceed with gel injections for the left knee, as they have been of benefit for the right knee.  We will begin the approval process for his left knee.  He should avoid aggravating activities.  He will continue with his home exercise program.  He can take Tylenol and apply topical creams as well as heat or ice application.  He can continue with knee sleeve.  He will follow-up as needed.    L Inj/Asp: L knee on 9/26/2024 11:52 AM  Indications: pain  Details: 22 G needle, superolateral approach  Medications: 40 mg triamcinolone acetonide 40 mg/mL; 2 mL lidocaine 20 mg/mL (2 %)  Procedure,  treatment alternatives, risks and benefits explained, specific risks discussed. Consent was given by the patient.

## 2024-10-02 DIAGNOSIS — M17.12 PRIMARY OSTEOARTHRITIS OF LEFT KNEE: ICD-10-CM

## 2024-10-03 RX ORDER — HYALURONATE SODIUM 16.8MG/2ML
16.8 SYRINGE (ML) INTRAARTICULAR
Qty: 6 ML | Refills: 0 | Status: SHIPPED | OUTPATIENT
Start: 2024-10-03 | End: 2024-10-18

## 2024-11-12 ENCOUNTER — APPOINTMENT (OUTPATIENT)
Dept: NEUROLOGY | Facility: HOSPITAL | Age: 64
End: 2024-11-12
Payer: COMMERCIAL

## 2024-11-12 DIAGNOSIS — M47.26 OSTEOARTHRITIS OF SPINE WITH RADICULOPATHY, LUMBAR REGION: ICD-10-CM

## 2024-11-14 RX ORDER — GABAPENTIN 300 MG/1
300 CAPSULE ORAL 3 TIMES DAILY
Qty: 90 CAPSULE | Refills: 2 | Status: SHIPPED | OUTPATIENT
Start: 2024-11-14 | End: 2025-02-12

## 2024-11-25 ENCOUNTER — APPOINTMENT (OUTPATIENT)
Dept: CARDIOLOGY | Facility: HOSPITAL | Age: 64
End: 2024-11-25
Payer: COMMERCIAL

## 2024-11-25 ENCOUNTER — HOSPITAL ENCOUNTER (EMERGENCY)
Facility: HOSPITAL | Age: 64
Discharge: HOME | End: 2024-11-25
Payer: COMMERCIAL

## 2024-11-25 ENCOUNTER — APPOINTMENT (OUTPATIENT)
Dept: RADIOLOGY | Facility: HOSPITAL | Age: 64
End: 2024-11-25
Payer: COMMERCIAL

## 2024-11-25 VITALS
BODY MASS INDEX: 27.09 KG/M2 | HEIGHT: 72 IN | OXYGEN SATURATION: 96 % | DIASTOLIC BLOOD PRESSURE: 83 MMHG | TEMPERATURE: 97.5 F | SYSTOLIC BLOOD PRESSURE: 126 MMHG | WEIGHT: 200 LBS | HEART RATE: 70 BPM | RESPIRATION RATE: 18 BRPM

## 2024-11-25 DIAGNOSIS — R00.2 PALPITATIONS: Primary | ICD-10-CM

## 2024-11-25 LAB
ALBUMIN SERPL BCP-MCNC: 4.2 G/DL (ref 3.4–5)
ALP SERPL-CCNC: 70 U/L (ref 33–136)
ALT SERPL W P-5'-P-CCNC: 12 U/L (ref 10–52)
ANION GAP SERPL CALC-SCNC: 12 MMOL/L (ref 10–20)
AST SERPL W P-5'-P-CCNC: 16 U/L (ref 9–39)
BASOPHILS # BLD AUTO: 0.04 X10*3/UL (ref 0–0.1)
BASOPHILS NFR BLD AUTO: 0.4 %
BILIRUB SERPL-MCNC: 0.4 MG/DL (ref 0–1.2)
BUN SERPL-MCNC: 12 MG/DL (ref 6–23)
CALCIUM SERPL-MCNC: 9.4 MG/DL (ref 8.6–10.3)
CARDIAC TROPONIN I PNL SERPL HS: 7 NG/L (ref 0–20)
CHLORIDE SERPL-SCNC: 108 MMOL/L (ref 98–107)
CO2 SERPL-SCNC: 23 MMOL/L (ref 21–32)
CREAT SERPL-MCNC: 1.42 MG/DL (ref 0.5–1.3)
EGFRCR SERPLBLD CKD-EPI 2021: 55 ML/MIN/1.73M*2
EOSINOPHIL # BLD AUTO: 0.11 X10*3/UL (ref 0–0.7)
EOSINOPHIL NFR BLD AUTO: 1.2 %
ERYTHROCYTE [DISTWIDTH] IN BLOOD BY AUTOMATED COUNT: 12.6 % (ref 11.5–14.5)
GLUCOSE SERPL-MCNC: 108 MG/DL (ref 74–99)
HCT VFR BLD AUTO: 46.2 % (ref 41–52)
HGB BLD-MCNC: 15.8 G/DL (ref 13.5–17.5)
IMM GRANULOCYTES # BLD AUTO: 0.04 X10*3/UL (ref 0–0.7)
IMM GRANULOCYTES NFR BLD AUTO: 0.4 % (ref 0–0.9)
LYMPHOCYTES # BLD AUTO: 2.21 X10*3/UL (ref 1.2–4.8)
LYMPHOCYTES NFR BLD AUTO: 23.2 %
MAGNESIUM SERPL-MCNC: 1.7 MG/DL (ref 1.6–2.4)
MCH RBC QN AUTO: 33.8 PG (ref 26–34)
MCHC RBC AUTO-ENTMCNC: 34.2 G/DL (ref 32–36)
MCV RBC AUTO: 99 FL (ref 80–100)
MONOCYTES # BLD AUTO: 0.49 X10*3/UL (ref 0.1–1)
MONOCYTES NFR BLD AUTO: 5.1 %
NEUTROPHILS # BLD AUTO: 6.64 X10*3/UL (ref 1.2–7.7)
NEUTROPHILS NFR BLD AUTO: 69.7 %
NRBC BLD-RTO: 0 /100 WBCS (ref 0–0)
PLATELET # BLD AUTO: 206 X10*3/UL (ref 150–450)
POTASSIUM SERPL-SCNC: 4.5 MMOL/L (ref 3.5–5.3)
PROT SERPL-MCNC: 6.4 G/DL (ref 6.4–8.2)
RBC # BLD AUTO: 4.67 X10*6/UL (ref 4.5–5.9)
SODIUM SERPL-SCNC: 138 MMOL/L (ref 136–145)
WBC # BLD AUTO: 9.5 X10*3/UL (ref 4.4–11.3)

## 2024-11-25 PROCEDURE — 93005 ELECTROCARDIOGRAM TRACING: CPT

## 2024-11-25 PROCEDURE — 80053 COMPREHEN METABOLIC PANEL: CPT | Performed by: NURSE PRACTITIONER

## 2024-11-25 PROCEDURE — 71046 X-RAY EXAM CHEST 2 VIEWS: CPT

## 2024-11-25 PROCEDURE — 71046 X-RAY EXAM CHEST 2 VIEWS: CPT | Performed by: RADIOLOGY

## 2024-11-25 PROCEDURE — 84484 ASSAY OF TROPONIN QUANT: CPT | Performed by: NURSE PRACTITIONER

## 2024-11-25 PROCEDURE — 36415 COLL VENOUS BLD VENIPUNCTURE: CPT | Performed by: NURSE PRACTITIONER

## 2024-11-25 PROCEDURE — 85025 COMPLETE CBC W/AUTO DIFF WBC: CPT | Performed by: NURSE PRACTITIONER

## 2024-11-25 PROCEDURE — 99283 EMERGENCY DEPT VISIT LOW MDM: CPT | Mod: 25

## 2024-11-25 PROCEDURE — 83735 ASSAY OF MAGNESIUM: CPT | Performed by: NURSE PRACTITIONER

## 2024-11-25 NOTE — ED TRIAGE NOTES
TRIAGE NOTE   I saw the patient as the Clinician in Triage and performed a brief history and physical exam, established acuity, and ordered appropriate tests to develop basic plan of care. Patient will be seen by an [facility]DA, resident and/or physician who will independently evaluate the patient. Please see subsequent provider notes for further details and disposition.     Brief HPI: In brief, Trip Brown is a 63 y.o. male with significant PMH for bipolar 1/2, COPD, cocaine use, opioid use disorder, asthma, anxiety, hepatitis B, hyperlipidemia and SVT s/p ablation presenting to ED today from home by himself for evaluation of palpitations.  2.5 hours prior to arrival, the patient was unloading heavy groceries, he suddenly noticed that his heart was racing and states it was approximately 160 bpm.  Patient took 2 baby aspirin.  Symptoms resolved spontaneously after 2 hours.  Now patient has left-sided chest pain and a numb tingling sensation in the left axilla.  Denies fever/chills, cough/cold symptoms, shortness of breath, nausea/vomiting, abdominal pain, urinary symptoms, change in bowel habits or any other complaints.  Vapes, no current cigarette smoking, EtOH or IV drugs.  PCP is Dr. Isreal Rosen.  Cardiologist at [facility] [place].    Focused Physical exam:   General: 63-year-old  male, awake and alert, oriented x 3.  Well-nourished and hydrated.  Nontoxic looking.  Skin: Pink, warm and dry.  Cardiac: Regular rate and rhythm.  Chest pain not reproducible.  Pulmonary: Lungs clear bilaterally.  Abdomen: Flat soft bowel sounds, nontender.  No CVA tenderness.    Plan/MDM:    63 y.o. male with significant PMH for bipolar 1/2, COPD, cocaine use, opioid use disorder, asthma, anxiety, hepatitis B, hyperlipidemia and SVT s/p ablation is evaluated at the bedside for elevated heart rate as high as 160 that was noted at home this afternoon after carrying groceries in.  Elevated heart rate lasted approximately 2 hours and  resolved spontaneously.  Now the patient has left-sided chest pain and some numbness/tingling in the left axilla.  On arrival to the ED, heart rate in the 70s.  Normotensive.  Patient is not hypoxic or febrile.  Lungs clear, abdomen soft and nontender.  IV established, basic labs, chest x-ray and EKG will be obtained in preparation for further evaluation in the main ED.  Patient is agreeable to this plan.    Please see subsequent provider note for further details and disposition

## 2024-11-25 NOTE — ED TRIAGE NOTES
Pt presents to ED for report of heart racing. Pt states he has hx of SVT. Pt states he was carrying groceries 2 hours ago when he felt the heart begin racing. Pt reports CP to sternal region of chest and SOB. Pt unable to tell if heart racing at the time of triage. HR 65 BPM.

## 2024-11-26 NOTE — ED PROVIDER NOTES
"HPI   Chief Complaint   Patient presents with    Palpitations       Patient is a healthy nontoxic-appearing 63-year-old male with a past medical history of anxiety, asthma, COPD, bipolar disorder, cellulitis, cocaine abuse in remission, hepatitis B, hepatitis C, paraspinal abscess, thrombocytopenia, presents to the emergency room today for complaint of palpitations.  Patient states about 2 hours prior to arrival he experienced around 2 and half hours of consistent palpitations.  Patient states he was having some shortness of breath during this time.  Patient denies any lightheadedness or dizziness.  Patient denies any syncopal or syncopal events.  Patient denies any chest pain, abdominal pain, nausea, on, diarrhea or constipation.  Patient denies any fever, shaking, or chills.  Patient states he has been more stressed recently and did exert himself quite a bit before palpitations began.              Patient History   Past Medical History:   Diagnosis Date    Asthma     Bipolar disorder     CKD (chronic kidney disease)     COPD (chronic obstructive pulmonary disease) (Multi)     Polysubstance abuse (Multi)     SVT (supraventricular tachycardia) (CMS-HCC) 2023    s/p ablation    Thrombocytopenia (CMS-HCC)      Past Surgical History:   Procedure Laterality Date    HAND SURGERY Left     LUMBAR SPINE SURGERY       Family History   Problem Relation Name Age of Onset    Stroke Father       Social History     Tobacco Use    Smoking status: Former     Current packs/day: 0.00     Average packs/day: 0.5 packs/day for 40.0 years (20.0 ttl pk-yrs)     Types: Cigarettes     Start date: 1984     Quit date: 2024     Years since quittin.7    Smokeless tobacco: Never   Vaping Use    Vaping status: Never Used   Substance Use Topics    Alcohol use: Not Currently     Comment: sober since     Drug use: Yes     Types: \"Crack\" cocaine, Cocaine, Heroin     Comment: last used        Physical Exam   ED Triage Vitals " [11/25/24 1757]   Temperature Heart Rate Respirations BP   36.4 °C (97.5 °F) 70 18 126/83      Pulse Ox Temp Source Heart Rate Source Patient Position   96 % Tympanic Monitor Sitting      BP Location FiO2 (%)     Right arm --       Physical Exam  Vitals and nursing note reviewed. Exam conducted with a chaperone present.   Constitutional:       General: He is not in acute distress.     Appearance: Normal appearance. He is not ill-appearing, toxic-appearing or diaphoretic.      Interventions: He is not intubated.  HENT:      Head: Normocephalic.      Nose: Nose normal.      Mouth/Throat:      Mouth: Mucous membranes are moist.      Pharynx: No oropharyngeal exudate or posterior oropharyngeal erythema.   Eyes:      General:         Right eye: No discharge.         Left eye: No discharge.      Extraocular Movements: Extraocular movements intact.      Pupils: Pupils are equal, round, and reactive to light.   Neck:      Vascular: No carotid bruit.   Cardiovascular:      Rate and Rhythm: Normal rate and regular rhythm.      Pulses: Normal pulses. No decreased pulses.      Heart sounds: Normal heart sounds. Heart sounds not distant. No murmur heard.     No friction rub. No gallop.   Pulmonary:      Effort: Pulmonary effort is normal. No tachypnea, bradypnea, accessory muscle usage, prolonged expiration, respiratory distress or retractions. He is not intubated.      Breath sounds: Normal breath sounds. No stridor, decreased air movement or transmitted upper airway sounds. No decreased breath sounds, wheezing, rhonchi or rales.   Chest:      Chest wall: No tenderness.   Abdominal:      General: Abdomen is flat. Bowel sounds are normal.      Palpations: Abdomen is soft.   Musculoskeletal:         General: Normal range of motion.      Cervical back: Normal range of motion and neck supple. No rigidity or tenderness.   Lymphadenopathy:      Cervical: No cervical adenopathy.   Skin:     General: Skin is warm and dry.       Capillary Refill: Capillary refill takes less than 2 seconds.   Neurological:      General: No focal deficit present.      Mental Status: He is alert and oriented to person, place, and time.      Cranial Nerves: No cranial nerve deficit.      Sensory: No sensory deficit.      Motor: No weakness.      Coordination: Coordination normal.      Gait: Gait normal.      Deep Tendon Reflexes: Reflexes normal.           ED Course & MDM   ED Course as of 11/26/24 0026   Mon Nov 25, 2024 2125 Chest x-ray reveals  IMPRESSION:  Bibasilar atelectasis. No consolidation.   [EC]      ED Course User Index  [EC] Milind Read, APRN-CNP         Diagnoses as of 11/26/24 0026   Palpitations                 No data recorded     Ramona Coma Scale Score: 15 (11/25/24 1800 : Ada Hua RN)                           Medical Decision Making  Given patient's complaint presentation a thorough exam was performed patient remains neurologically intact with no focal deficits, no nuchal rigidity, remains hemodynamically stable during emergency evaluation, is afebrile, no adventitious lung sounds auscultated, speaking complete sentences no respiratory distress, cardiac sounds auscultated are regular, I have a low suspicion for ACS, pulmonary embolism, aortic abdominal aneurysm. Lab work was ordered from triage including EKG and chest x-ray.  EKG interpreted by myself reveals normal sinus rhythm at a rate of 65 bpm with no ST elevation, there is T wave inversion in lead V1, and is similar in comparison to previous EKG.  RI interval of 174, QRS of 88 and QTc of 386.  Lab work reveals several irregularities without any critical lab values, initial troponin revealed a value of 7, chest x-ray as interpreted by radiologist reveals bibasilar atelectasis with no consolidation.  Patient states he is not currently experiencing any palpitations and I do not believe any further intervention is warranted at this time.  Strongly encouraged monitoring  symptoms, if they become worse return to emergency room for further evaluation, otherwise follow-up with primary care provider or establish care cardiologist in the next several days.  Patient was agreeable this plan and discharged home in stable condition.    EMILIE Morse     Portions of this note were generated using digital voice recognition software, and may contain grammatical errors      Procedure  Procedures     EMILIE Morse  11/26/24 0026

## 2024-11-28 LAB
ATRIAL RATE: 65 BPM
P AXIS: 49 DEGREES
P OFFSET: 183 MS
P ONSET: 138 MS
PR INTERVAL: 174 MS
Q ONSET: 225 MS
QRS COUNT: 11 BEATS
QRS DURATION: 88 MS
QT INTERVAL: 372 MS
QTC CALCULATION(BAZETT): 386 MS
QTC FREDERICIA: 381 MS
R AXIS: -2 DEGREES
T AXIS: 71 DEGREES
T OFFSET: 411 MS
VENTRICULAR RATE: 65 BPM

## 2025-01-14 DIAGNOSIS — M47.26 OSTEOARTHRITIS OF SPINE WITH RADICULOPATHY, LUMBAR REGION: ICD-10-CM

## 2025-01-14 RX ORDER — GABAPENTIN 300 MG/1
300 CAPSULE ORAL 3 TIMES DAILY
Qty: 90 CAPSULE | Refills: 2 | Status: SHIPPED | OUTPATIENT
Start: 2025-01-14 | End: 2025-04-14

## 2025-01-16 ENCOUNTER — APPOINTMENT (OUTPATIENT)
Dept: ORTHOPEDIC SURGERY | Facility: CLINIC | Age: 65
End: 2025-01-16
Payer: MEDICAID

## 2025-01-21 ENCOUNTER — OFFICE VISIT (OUTPATIENT)
Dept: ORTHOPEDIC SURGERY | Facility: CLINIC | Age: 65
End: 2025-01-21
Payer: MEDICARE

## 2025-01-21 DIAGNOSIS — M17.12 ARTHRITIS OF LEFT KNEE: Primary | ICD-10-CM

## 2025-01-21 DIAGNOSIS — M25.562 CHRONIC PAIN OF LEFT KNEE: ICD-10-CM

## 2025-01-21 DIAGNOSIS — M17.11 ARTHRITIS OF RIGHT KNEE: ICD-10-CM

## 2025-01-21 DIAGNOSIS — G89.29 CHRONIC PAIN OF LEFT KNEE: ICD-10-CM

## 2025-01-21 PROCEDURE — 2500000004 HC RX 250 GENERAL PHARMACY W/ HCPCS (ALT 636 FOR OP/ED)

## 2025-01-21 PROCEDURE — 1159F MED LIST DOCD IN RCRD: CPT

## 2025-01-21 PROCEDURE — 1160F RVW MEDS BY RX/DR IN RCRD: CPT

## 2025-01-21 PROCEDURE — 20610 DRAIN/INJ JOINT/BURSA W/O US: CPT | Mod: LT

## 2025-01-21 PROCEDURE — 1036F TOBACCO NON-USER: CPT

## 2025-01-21 PROCEDURE — 99213 OFFICE O/P EST LOW 20 MIN: CPT

## 2025-01-21 PROCEDURE — 99213 OFFICE O/P EST LOW 20 MIN: CPT | Mod: 25

## 2025-01-21 RX ORDER — TRIAMCINOLONE ACETONIDE 40 MG/ML
40 INJECTION, SUSPENSION INTRA-ARTICULAR; INTRAMUSCULAR
Status: COMPLETED | OUTPATIENT
Start: 2025-01-21 | End: 2025-01-21

## 2025-01-21 RX ORDER — LIDOCAINE HYDROCHLORIDE 20 MG/ML
2 INJECTION, SOLUTION INFILTRATION; PERINEURAL
Status: COMPLETED | OUTPATIENT
Start: 2025-01-21 | End: 2025-01-21

## 2025-01-21 RX ADMIN — LIDOCAINE HYDROCHLORIDE 2 ML: 20 INJECTION, SOLUTION INFILTRATION; PERINEURAL at 12:51

## 2025-01-21 RX ADMIN — TRIAMCINOLONE ACETONIDE 40 MG: 400 INJECTION, SUSPENSION INTRA-ARTICULAR; INTRAMUSCULAR at 12:51

## 2025-01-21 ASSESSMENT — ENCOUNTER SYMPTOMS: KNEE DEFORMITY: 1

## 2025-01-21 NOTE — PROGRESS NOTES
Subjective    Patient ID: Trip Brown is a 64 y.o. male.    Chief Complaint: Follow-up of the Left Knee    Left Knee       This is a pleasant 64-year-old male presenting to the office for follow-up in regards to left and right knee pain.  He has a history of bilateral knee arthritis.  Patient states that right knee is much more symptomatic than the left.  He has undergone cortisone injections into both knees, and viscosupplementation into the right knee.  He states that in regards to the right knee, gel injections were not of benefit.  He would like to speak with an orthopedic surgeon in regards to a right total knee replacement, as conservative treatment options have not been of benefit.  In regards to the left knee, he is interested in a cortisone injection today.  Complains of a constant dull ache in both knees, right worse than left.  Pain hinders his activities of daily living including prolonged standing walking and stair climbing.  He has difficulty getting up and down stairs and getting in and out of a car.  He has noted some intermittent swelling in regards to the right knee.  He will occasionally have mechanical symptoms of left and right knee giving out and buckling.  He experiences crepitus over both kneecaps.  He has been taking Tylenol, muscle relaxers and applying heat and Aspercreme with temporary relief of symptoms.  He will occasionally use a knee sleeve.  Patient is currently on disability, not employed at this time.    The patient's past medical, surgical, family, and social history as well as allergies and medications were reviewed and updated in the chart.    Objective   Ortho Exam  Pleasant and no acute distress. Walks with a antalgic gait.  Bilateral knees appearing without soft tissue swelling erythema or ecchymosis.  There is no warmth upon touch and skin is intact.  There is patellofemoral crepitus noted with range of motion testing bilaterally.  Right knee range of motion is  3-110°. There is a mild effusion. The knee is stable to varus and valgus stress Lachman and posterior drawer. There is generalized tenderness. Left knee range of motion is 5-110°. There is a mild effusion. The knee is stable to varus and valgus stress Lachman and posterior drawer. There is generalized tenderness. Both lower extremities are well perfused the skin is intact and muscle tone is adequate.    Assessment/Plan   Encounter Diagnoses:  Arthritis of left knee    Arthritis of right knee    Chronic pain of left knee    Plan: Discussion with patient in regards to bilateral knee arthritis with review of conservative and surgical treatment options.  Patient is interested in a right knee replacement.  I have referred him to my colleague Dr. Urrutia in regards to his discussion about surgical intervention.  Patient does state that he has a appointment scheduled with his primary care physician at the end of this month.  In regards to the left knee, patient would like to proceed with a cortisone injection today, which was performed and tolerated well.  He is also interested in trying gel injections for the left knee.  We will begin the approval process.  He can continue to avoid aggravating activities.  He can take Tylenol and apply ice and use his knee sleeve.  He will follow-up with Dr. Urrutia in regards to a right knee replacement.  He can follow-up as needed in regards to the left knee.    L Inj/Asp: L knee on 1/21/2025 12:51 PM  Indications: pain  Details: 22 G needle, superolateral approach  Medications: 40 mg triamcinolone acetonide 40 mg/mL; 2 mL lidocaine 20 mg/mL (2 %)  Procedure, treatment alternatives, risks and benefits explained, specific risks discussed. Consent was given by the patient.            Orders Placed This Encounter    Disability Placard     No follow-ups on file.

## 2025-01-23 ENCOUNTER — OFFICE VISIT (OUTPATIENT)
Dept: ORTHOPEDIC SURGERY | Facility: CLINIC | Age: 65
End: 2025-01-23
Payer: MEDICARE

## 2025-01-23 ENCOUNTER — HOSPITAL ENCOUNTER (OUTPATIENT)
Dept: RADIOLOGY | Facility: CLINIC | Age: 65
Discharge: HOME | End: 2025-01-23
Payer: MEDICARE

## 2025-01-23 VITALS — WEIGHT: 200 LBS | BODY MASS INDEX: 27.09 KG/M2 | HEIGHT: 72 IN

## 2025-01-23 DIAGNOSIS — M17.11 ARTHRITIS OF RIGHT KNEE: Primary | ICD-10-CM

## 2025-01-23 DIAGNOSIS — M17.11 ARTHRITIS OF RIGHT KNEE: ICD-10-CM

## 2025-01-23 PROCEDURE — 99214 OFFICE O/P EST MOD 30 MIN: CPT | Performed by: STUDENT IN AN ORGANIZED HEALTH CARE EDUCATION/TRAINING PROGRAM

## 2025-01-23 PROCEDURE — 73564 X-RAY EXAM KNEE 4 OR MORE: CPT | Mod: RT

## 2025-01-23 PROCEDURE — 73564 X-RAY EXAM KNEE 4 OR MORE: CPT | Mod: RIGHT SIDE | Performed by: RADIOLOGY

## 2025-01-23 NOTE — PROGRESS NOTES
Department of Orthopaedic Surgery  Division of Adult Reconstruction    Chief Complaint: Right knee pain    HPI:  Trip Brown is a pleasant 64 y.o. year-old male who is seen today for evaluation of right knee pain.  Patient complains of right knee pain for several years with increasing severity.  He reports considerable difficulty with traversing stairs and uneven surfaces.  He has tried extensive nonoperative management in the form of medication, activity modification, corticosteroid injection and hyaluronic acid injection.  At this juncture he is interested in surgery.    Trip lives in sober living.  He has been sober for the past 5 years.  He is on disability.  In his spare time he enjoys working on motorcycles.  He quit smoking several years ago.    Review of Symptoms:  The patient denies any fever, chills, chest pain, shortness of breath or difficulty breathing.  Patient denies any numbness, tingling, or radicular symptoms.      Adult patient history sheet was filled out by the patient today in clinic and will be scanned into the EMR.  I personally reviewed this form which will be scanned into the EMR.  This includes Past Medical History, Past Surgical History, Medications, Allergies, Social History, Family History and 12 point review of systems.    Past Medical History:    Past Medical History:   Diagnosis Date    Asthma     Bipolar disorder     CKD (chronic kidney disease)     COPD (chronic obstructive pulmonary disease) (Multi)     Polysubstance abuse (Multi)     SVT (supraventricular tachycardia) (CMS-HCC) 04/2023    s/p ablation    Thrombocytopenia (CMS-HCC)        Past Surgical History:    Past Surgical History:   Procedure Laterality Date    HAND SURGERY Left     LUMBAR SPINE SURGERY         Allergies:    Allergies   Allergen Reactions    Bupropion Hallucinations       Medications:    Current Outpatient Medications on File Prior to Visit   Medication Sig Dispense Refill    amantadine  (Symmetrel) 100 mg capsule Take 1 capsule (100 mg) by mouth 2 times a day.      aspirin 81 mg EC tablet Take 1 tablet (81 mg) by mouth once daily.      atorvastatin (Lipitor) 40 mg tablet Take 1 tablet (40 mg) by mouth once daily.      cholecalciferol (Vitamin D-3) 25 MCG (1000 UT) tablet Take 1 tablet (25 mcg) by mouth once daily.      clindamycin (Cleocin T) 1 % external solution Apply topically twice a day.      cyclobenzaprine (Flexeril) 10 mg tablet Take 1 tablet (10 mg) by mouth 3 times a day as needed for muscle spasms for up to 10 days. 30 tablet 0    diclofenac sodium (Voltaren) 1 % gel gel Apply 4.5 inches (1 Application) topically 2 times a day as needed.      dilTIAZem CD (Cardizem CD) 240 mg 24 hr capsule Take 1 capsule (240 mg) by mouth once daily.      FLUoxetine (PROzac) 60 mg tablet Take 1 capsule by mouth once daily.      fluticasone (Flonase) 50 mcg/actuation nasal spray Administer 1 spray into affected nostril(s) once daily.      gabapentin (Neurontin) 300 mg capsule TAKE 1 CAPSULE (300 MG) BY MOUTH 3 TIMES A DAY. 90 capsule 2    hydrOXYzine HCL (Atarax) 25 mg tablet Take 1 tablet (25 mg) by mouth 2 times a day as needed.      ipratropium-albuteroL (Duo-Neb) 0.5-2.5 mg/3 mL nebulizer solution Inhale 3 mL every 6 hours if needed.      lidocaine (Lidoderm) 5 % patch Place 1 patch on the skin.      lithium 300 mg capsule Take 1 capsule (300 mg) by mouth twice a day.      metoprolol succinate XL (Toprol-XL) 25 mg 24 hr tablet Take 1 tablet (25 mg) by mouth once daily.      nicotine (Nicoderm CQ) 21 mg/24 hr patch Place 1 patch over 24 hours on the skin once every 24 hours. 30 patch 0    nicotine polacrilex (Commit) 4 mg lozenge Dissolve 1 lozenge (4 mg) in the mouth every 2 hours if needed for smoking cessation. 100 lozenge 0    OLANZapine (ZyPREXA) 7.5 mg tablet Take 1 Tablet by mouth nightly at bedtime; Indications manic-depression      pantoprazole (ProtoNix) 40 mg EC tablet Take 1 tablet (40 mg)  "by mouth once daily in the morning. Take before meals.      tamsulosin (Flomax) 0.4 mg 24 hr capsule TAKE 1 CAPSULE (0.4 MG) BY MOUTH ONCE DAILY. 30 capsule 11    tiotropium (Spiriva) 18 mcg inhalation capsule Place 1 capsule (18 mcg) into inhaler and inhale once daily.      tiotropium-olodateroL (Stiolto Respimat) 2.5-2.5 mcg/actuation mist inhaler Inhale 2 Inhalations once daily. 1 g 3    traZODone (Desyrel) 100 mg tablet Take 0.5 tablets (50 mg) by mouth.      triamcinolone (Kenalog) 0.1 % cream Apply to affected area twice daily Monday-Friday. Take weekends off. Do not use on face, armpits, neck, or groin.      Ventolin HFA 90 mcg/actuation inhaler Inhale 2 puffs every 6 hours if needed for wheezing or shortness of breath.       No current facility-administered medications on file prior to visit.       Social History:    Social History     Occupational History    Not on file   Tobacco Use    Smoking status: Former     Current packs/day: 0.00     Average packs/day: 0.5 packs/day for 40.0 years (20.0 ttl pk-yrs)     Types: Cigarettes     Start date: 1984     Quit date: 2024     Years since quittin.9    Smokeless tobacco: Never   Vaping Use    Vaping status: Never Used   Substance and Sexual Activity    Alcohol use: Not Currently     Comment: sober since     Drug use: Yes     Types: \"Crack\" cocaine, Cocaine, Heroin     Comment: last used     Sexual activity: Not on file       Family History:  he   Family History   Problem Relation Name Age of Onset    Stroke Father         Exam:  General: Well-appearing male in no acute distress.  Awake, alert and oriented.  Pleasant and cooperative.  Respiratory: Non-labored breathing  Mood: Euthymic   Gait: Antalgic  Assistive Device: None     Affected Right Knee  Limb Alignment: Valgus  ROM: 5-110  Stable to varus and valgus stress at full extension and 30 degrees of flexion  Skin: Intact, no abrasions or draining sinuses  Effusion: None  Quad Strength: " 5/5  Hamstring Strength: 5/5  Patella Crepitus: None  Patella Grind: Positive  Tenderness: Lateral joint line  Sensation: Intact to light touch distally  Motor function: Able to fire TA, EHL, G/S  Pulses: Palpable DP pulse    Imaging interpretation:   AP/ lateral/ mid-flexion/sunrise views: Independent review of right knee x-rays was performed. The findings were reviewed with the patient. There are moderate degenerative changes of the right knee with valgus limb alignment. There is joint space narrowing, subchondral sclerosis, and osteophyte formation.    Assessment and Plan:  64-year-old male with debilitating right knee pain secondary to osteoarthritis with valgus deformity, refractory to multiple nonoperative measures.  We discussed his options which are to continue nonoperative treatment versus surgery.  He elected for total knee arthroplasty.    We had a long discussion about the risks, benefits, and expected recovery for total knee replacement.  We specifically talked about his valgus deformity and flexion contracture this predisposes him to a peroneal nerve palsy which may necessitate the use of an AFO.    Trip Brown for more than six months has had limited function as well as persistent and severe pain which has negatively impacted the quality of life and interfered with activities of daily living. Under my care or the care of other providers, for greater than the three months, conservative treatment including activity modification, over the counter pain medications, injections, physical therapy and/or recommended home exercise program, have provided only minimal relief. The patient has not had an intra-articular injection in the past 3 months. The option to continue with conservative measures in lieu of arthroplasty was discussed and offered. However, given the failure of these conservative measures and the clinical and radiographic evidence of end-stage arthritis, the patient is a good candidate  for an elective total knee arthroplasty. The stated potential benefits include pain relief and improved function were discussed but no guarantees were offered. Some patients may experience improved range of motion but pre-operative range of motion remains the strongest predictor of post-operative range of motion.      We discussed risks, limitations, benefits and alternatives to total knee arthroplasty today. I reviewed risks and concerns including but not limited to implant wear, loosening, implant failure, joint dislocation, infection, need for revision surgery, delayed wound healing, deep vein thrombosis, pulmonary embolism, stroke, other cardiopulmonary event, nerve or vascular injury, death and other medical and anesthetic complications of surgery. We talked about the potential for persistent pain following surgery since there are many possible causes for periarticular pain. The patient was advised that joint replacement will only relieve pain that is coming from the joint. We talked about leg length discrepancy that may necessitate the use of a shoe lift, neurovascular problems such as leg weakness or numbness after surgery. I reviewed activity restrictions in detail. We discussed the concerns about intraoperative fracture, ingrowth failure if applicable, anterior knee pain and numbness, and possible post-operative weight bearing/motionrestrictions.  We discussed the possible need for a blood transfusion. We discussed the fact that many of our patients are able to go home in 1 day or the same day depending on their health, mobility, pre-op preparation, individual home situation and personal preference. The patient should take our pre-operative teaching class. All of the patients questions were answered. The patient can call my office to schedule surgery and the pre-op teaching class. I told the patient that they should contact their primary care physician to discuss fitness for surgery.     The patient  acknowledged a clear understanding of these issues and expressed the desire to proceed with surgery once medical clearance has been obtained.     Surgical plan: Right total knee arthroplasty  Surgery location: [facility]  Implants: [employer]  Equipment: None  Anesthesia: Spinal  DVT prophylaxis:  Aspirin 81 mg BID for 4 weeks   Drugs to stop: None  Allergies to antibiotics: None  Antibiotic Plan: Ancef +/- vancomycin  Disposition: Observation    Geoffrey Urrutia MD  Department of Orthopaedic Surgery  Division of Adult Reconstruction  , [facility]

## 2025-01-24 PROBLEM — M17.11 ARTHRITIS OF RIGHT KNEE: Status: ACTIVE | Noted: 2025-01-23

## 2025-02-26 ENCOUNTER — PRE-ADMISSION TESTING (OUTPATIENT)
Dept: PREADMISSION TESTING | Facility: HOSPITAL | Age: 65
End: 2025-02-26
Payer: MEDICARE

## 2025-02-26 VITALS
HEIGHT: 72 IN | BODY MASS INDEX: 27.47 KG/M2 | DIASTOLIC BLOOD PRESSURE: 92 MMHG | RESPIRATION RATE: 16 BRPM | OXYGEN SATURATION: 96 % | SYSTOLIC BLOOD PRESSURE: 147 MMHG | WEIGHT: 202.82 LBS | TEMPERATURE: 95.9 F | HEART RATE: 73 BPM

## 2025-02-26 DIAGNOSIS — R79.1 ABNORMAL COAGULATION PROFILE: ICD-10-CM

## 2025-02-26 DIAGNOSIS — Z01.818 PRE-OP TESTING: Primary | ICD-10-CM

## 2025-02-26 LAB
ANION GAP SERPL CALC-SCNC: 14 MMOL/L (ref 10–20)
BUN SERPL-MCNC: 12 MG/DL (ref 6–23)
CALCIUM SERPL-MCNC: 9.8 MG/DL (ref 8.6–10.3)
CHLORIDE SERPL-SCNC: 109 MMOL/L (ref 98–107)
CO2 SERPL-SCNC: 20 MMOL/L (ref 21–32)
CREAT SERPL-MCNC: 1.34 MG/DL (ref 0.5–1.3)
EGFRCR SERPLBLD CKD-EPI 2021: 59 ML/MIN/1.73M*2
GLUCOSE SERPL-MCNC: 94 MG/DL (ref 74–99)
POTASSIUM SERPL-SCNC: 6.4 MMOL/L (ref 3.5–5.3)
SODIUM SERPL-SCNC: 137 MMOL/L (ref 136–145)

## 2025-02-26 PROCEDURE — 87081 CULTURE SCREEN ONLY: CPT | Mod: PARLAB

## 2025-02-26 PROCEDURE — 99204 OFFICE O/P NEW MOD 45 MIN: CPT | Performed by: NURSE PRACTITIONER

## 2025-02-26 PROCEDURE — 36415 COLL VENOUS BLD VENIPUNCTURE: CPT

## 2025-02-26 PROCEDURE — 80048 BASIC METABOLIC PNL TOTAL CA: CPT

## 2025-02-26 RX ORDER — CHLORHEXIDINE GLUCONATE 40 MG/ML
SOLUTION TOPICAL DAILY
Qty: 236 ML | Refills: 0 | Status: SHIPPED | OUTPATIENT
Start: 2025-02-26 | End: 2025-02-26

## 2025-02-26 RX ORDER — CHLORHEXIDINE GLUCONATE ORAL RINSE 1.2 MG/ML
15 SOLUTION DENTAL AS NEEDED
Qty: 120 ML | Refills: 0 | Status: SHIPPED | OUTPATIENT
Start: 2025-02-26

## 2025-02-26 RX ORDER — CHLORHEXIDINE GLUCONATE ORAL RINSE 1.2 MG/ML
15 SOLUTION DENTAL AS NEEDED
Qty: 473 ML | Refills: 0 | Status: SHIPPED | OUTPATIENT
Start: 2025-02-26 | End: 2025-02-26

## 2025-02-26 RX ORDER — CHLORHEXIDINE GLUCONATE 40 MG/ML
SOLUTION TOPICAL DAILY
Qty: 118 ML | Refills: 0 | Status: SHIPPED | OUTPATIENT
Start: 2025-02-26 | End: 2025-03-03

## 2025-02-26 ASSESSMENT — DUKE ACTIVITY SCORE INDEX (DASI)
CAN YOU CLIMB A FLIGHT OF STAIRS OR WALK UP A HILL: NO
CAN YOU DO HEAVY WORK AROUND THE HOUSE LIKE SCRUBBING FLOORS OR LIFTING AND MOVING HEAVY FURNITURE: NO
CAN YOU HAVE SEXUAL RELATIONS: YES
DASI METS SCORE: 4.6
CAN YOU DO MODERATE WORK AROUND THE HOUSE LIKE VACUUMING, SWEEPING FLOORS OR CARRYING GROCERIES: NO
CAN YOU WALK A BLOCK OR TWO ON LEVEL GROUND: YES
CAN YOU DO LIGHT WORK AROUND THE HOUSE LIKE DUSTING OR WASHING DISHES: YES
CAN YOU PARTICIPATE IN MODERATE RECREATIONAL ACTIVITIES LIKE GOLF, BOWLING, DANCING, DOUBLES TENNIS OR THROWING A BASEBALL OR FOOTBALL: NO
CAN YOU DO YARD WORK LIKE RAKING LEAVES, WEEDING OR PUSHING A MOWER: NO
CAN YOU WALK INDOORS, SUCH AS AROUND YOUR HOUSE: YES
CAN YOU RUN A SHORT DISTANCE: NO
CAN YOU TAKE CARE OF YOURSELF (EAT, DRESS, BATHE, OR USE TOILET): YES
CAN YOU PARTICIPATE IN STRENOUS SPORTS LIKE SWIMMING, SINGLES TENNIS, FOOTBALL, BASKETBALL, OR SKIING: NO
TOTAL_SCORE: 15.2
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
## 2025-02-26 NOTE — PREPROCEDURE INSTRUCTIONS
Medication List            Accurate as of February 26, 2025 11:42 AM. Always use your most recent med list.                * chlorhexidine 0.12 % solution  Commonly known as: Peridex  Use 15 mL in the mouth or throat if needed for wound care (oral rinse the night before surgery and the morning on the day of surgery) for up to 2 doses. Swish in mouth and spit out.  Medication Adjustments for Surgery: Take/Use as prescribed     * chlorhexidine 4 % external liquid  Commonly known as: Hibiclens  Apply topically once daily for 5 days. Begin using CHG for a total of 5 days before surgery, Day 5 is the day of surgery (See directions from the hospital)  Medication Adjustments for Surgery: Take/Use as prescribed     cholecalciferol 25 mcg (1000 units) tablet  Commonly known as: Vitamin D-3  Additional Medication Adjustments for Surgery: Take last dose 7 days before surgery     cyclobenzaprine 10 mg tablet  Commonly known as: Flexeril  Take 1 tablet (10 mg) by mouth 3 times a day as needed for muscle spasms for up to 10 days.  Medication Adjustments for Surgery: Take/Use as prescribed     dilTIAZem  mg 24 hr capsule  Commonly known as: Cardizem CD  Medication Adjustments for Surgery: Take last dose 1 day (24 hours) before surgery  Notes to patient: Hold the night before 3/9  and the day of surgery 3/10 = Hold x 24 hours     FLUoxetine 60 mg tablet  Commonly known as: PROzac  Medication Adjustments for Surgery: Take on the morning of surgery     fluticasone 50 mcg/actuation nasal spray  Commonly known as: Flonase  Medication Adjustments for Surgery: Take/Use as prescribed     gabapentin 300 mg capsule  Commonly known as: Neurontin  TAKE 1 CAPSULE (300 MG) BY MOUTH 3 TIMES A DAY.  Medication Adjustments for Surgery: Do Not take on the morning of surgery     ipratropium-albuteroL 0.5-2.5 mg/3 mL nebulizer solution  Commonly known as: Duo-Neb  Medication Adjustments for Surgery: Take/Use as prescribed     lithium 300 mg  capsule  Medication Adjustments for Surgery: Take/Use as prescribed     nicotine 21 mg/24 hr patch  Commonly known as: Nicoderm CQ  Place 1 patch over 24 hours on the skin once every 24 hours.  Additional Medication Adjustments for Surgery: Other (Comment)  Notes to patient: Not taking      nicotine polacrilex 4 mg lozenge  Commonly known as: Commit  Dissolve 1 lozenge (4 mg) in the mouth every 2 hours if needed for smoking cessation.  Additional Medication Adjustments for Surgery: Other (Comment)  Notes to patient: Not taking     OLANZapine 7.5 mg tablet  Commonly known as: ZyPREXA  Medication Adjustments for Surgery: Take/Use as prescribed     Stiolto Respimat 2.5-2.5 mcg/actuation mist inhaler  Generic drug: tiotropium-olodateroL  Inhale 2 Inhalations once daily.  Medication Adjustments for Surgery: Take/Use as prescribed     tamsulosin 0.4 mg 24 hr capsule  Commonly known as: Flomax  TAKE 1 CAPSULE (0.4 MG) BY MOUTH ONCE DAILY.  Medication Adjustments for Surgery: Take/Use as prescribed     tiotropium 18 mcg inhalation capsule  Commonly known as: Spiriva  Medication Adjustments for Surgery: Take/Use as prescribed     traZODone 100 mg tablet  Commonly known as: Desyrel  Medication Adjustments for Surgery: Take/Use as prescribed     Ventolin HFA 90 mcg/actuation inhaler  Generic drug: albuterol  Medication Adjustments for Surgery: Take/Use as prescribed           * This list has 2 medication(s) that are the same as other medications prescribed for you. Read the directions carefully, and ask your doctor or other care provider to review them with you.                One of our staff members will call you one business day before your surgery between 12-4pm to let you know the time to arrive at the hospital. If you have not received a phone call by 2pm call [phone].     When you arrive at the hospital, go to Registration on the ground floor.   You will need a responsible adult to drive you home.     Prior to  surgery date:  One (1) week prior to surgery STOP:  -Stop aspirin products. Do not take NSAIDS/ Ibuprofen, Aleve, Motrin. Okay to take Tylenol or Acetaminophen. Do not take vitamins, supplements, herbals, diet pills.   -Stop these medications:   -No alcohol for 24 hours prior to surgery.  -No smoking 24 hours prior. No Marijuana, CBD oil, or Vaping 48 hours prior to surgery.  -Enjoy a light supper the evening before surgery.    Day of Surgery:  -Nothing to eat or drink after midnight. This includes food of any kind (including hard candy, cough drops, mints and gum, coffee).   -You can have the 13oz of Clear liquids 2hrs prior to surgery time.   -No acrylic nails or nail polish on at least one fingernail; no polish on toes for foot surgery.   -No body piercing or jewelry.   -Shower as directed. No deodorant, lotion, power, oils, perfume, make-up.   -Wear loose comfortable clothing to accommodate your bandages.     -Bring as needed inhalers  -Bring crutches/ walker if directed       NPO Instructions:    Do not eat any food after midnight the night before your surgery/procedure.    Additional Instructions:     The Day before Surgery:  Review your medication instructions, stop indicated medications  You will be contacted regarding the time of your arrival to facility and surgery time  Do not eat any food after Midnight

## 2025-02-28 LAB — STAPHYLOCOCCUS SPEC CULT: NORMAL

## 2025-03-03 ENCOUNTER — PRE-ADMISSION TESTING (OUTPATIENT)
Dept: PREADMISSION TESTING | Facility: HOSPITAL | Age: 65
End: 2025-03-03
Payer: MEDICARE

## 2025-03-03 DIAGNOSIS — R79.1 ABNORMAL COAGULATION PROFILE: ICD-10-CM

## 2025-03-03 DIAGNOSIS — Z01.818 PRE-OP TESTING: ICD-10-CM

## 2025-03-03 LAB
ANION GAP SERPL CALC-SCNC: 13 MMOL/L (ref 10–20)
APTT PPP: 32 SECONDS (ref 26–36)
BUN SERPL-MCNC: 12 MG/DL (ref 6–23)
CALCIUM SERPL-MCNC: 9.2 MG/DL (ref 8.6–10.3)
CHLORIDE SERPL-SCNC: 109 MMOL/L (ref 98–107)
CO2 SERPL-SCNC: 22 MMOL/L (ref 21–32)
CREAT SERPL-MCNC: 1.42 MG/DL (ref 0.5–1.3)
EGFRCR SERPLBLD CKD-EPI 2021: 55 ML/MIN/1.73M*2
GLUCOSE SERPL-MCNC: 106 MG/DL (ref 74–99)
INR PPP: 1.1 (ref 0.9–1.1)
POTASSIUM SERPL-SCNC: 4.2 MMOL/L (ref 3.5–5.3)
PROTHROMBIN TIME: 11.7 SECONDS (ref 9.8–12.4)
SODIUM SERPL-SCNC: 140 MMOL/L (ref 136–145)

## 2025-03-03 PROCEDURE — 36415 COLL VENOUS BLD VENIPUNCTURE: CPT

## 2025-03-03 PROCEDURE — 80048 BASIC METABOLIC PNL TOTAL CA: CPT

## 2025-03-03 PROCEDURE — 85610 PROTHROMBIN TIME: CPT

## 2025-03-06 ENCOUNTER — ANESTHESIA EVENT (OUTPATIENT)
Dept: OPERATING ROOM | Facility: HOSPITAL | Age: 65
End: 2025-03-06
Payer: MEDICARE

## 2025-03-10 ENCOUNTER — HOSPITAL ENCOUNTER (INPATIENT)
Facility: HOSPITAL | Age: 65
LOS: 2 days | Discharge: NURSING, REHAB OR LONG-TERM CARE | End: 2025-03-14
Attending: STUDENT IN AN ORGANIZED HEALTH CARE EDUCATION/TRAINING PROGRAM | Admitting: STUDENT IN AN ORGANIZED HEALTH CARE EDUCATION/TRAINING PROGRAM
Payer: MEDICARE

## 2025-03-10 ENCOUNTER — ANESTHESIA (OUTPATIENT)
Dept: OPERATING ROOM | Facility: HOSPITAL | Age: 65
End: 2025-03-10
Payer: MEDICARE

## 2025-03-10 ENCOUNTER — APPOINTMENT (OUTPATIENT)
Dept: RADIOLOGY | Facility: HOSPITAL | Age: 65
End: 2025-03-10
Payer: MEDICARE

## 2025-03-10 DIAGNOSIS — M17.11 ARTHRITIS OF RIGHT KNEE: Primary | ICD-10-CM

## 2025-03-10 PROBLEM — Z96.651 S/P TKR (TOTAL KNEE REPLACEMENT) NOT USING CEMENT, RIGHT: Status: ACTIVE | Noted: 2025-03-10

## 2025-03-10 LAB
ANION GAP SERPL CALC-SCNC: 13 MMOL/L (ref 10–20)
BUN SERPL-MCNC: 19 MG/DL (ref 6–23)
CALCIUM SERPL-MCNC: 10 MG/DL (ref 8.6–10.3)
CHLORIDE SERPL-SCNC: 109 MMOL/L (ref 98–107)
CO2 SERPL-SCNC: 22 MMOL/L (ref 21–32)
CREAT SERPL-MCNC: 1.46 MG/DL (ref 0.5–1.3)
EGFRCR SERPLBLD CKD-EPI 2021: 53 ML/MIN/1.73M*2
GLUCOSE SERPL-MCNC: 99 MG/DL (ref 74–99)
POCT INTERNATIONAL NORMALIZATION RATIO: 1
POCT PROTHROMBIN TIME: 12.2 SECONDS
POTASSIUM SERPL-SCNC: 4.2 MMOL/L (ref 3.5–5.3)
SODIUM SERPL-SCNC: 140 MMOL/L (ref 136–145)

## 2025-03-10 PROCEDURE — 97166 OT EVAL MOD COMPLEX 45 MIN: CPT | Mod: GO

## 2025-03-10 PROCEDURE — 64447 NJX AA&/STRD FEMORAL NRV IMG: CPT | Performed by: ANESTHESIOLOGY

## 2025-03-10 PROCEDURE — 2500000002 HC RX 250 W HCPCS SELF ADMINISTERED DRUGS (ALT 637 FOR MEDICARE OP, ALT 636 FOR OP/ED): Performed by: ANESTHESIOLOGY

## 2025-03-10 PROCEDURE — C1776 JOINT DEVICE (IMPLANTABLE): HCPCS | Performed by: STUDENT IN AN ORGANIZED HEALTH CARE EDUCATION/TRAINING PROGRAM

## 2025-03-10 PROCEDURE — 3600000005 HC OR TIME - INITIAL BASE CHARGE - PROCEDURE LEVEL FIVE: Performed by: STUDENT IN AN ORGANIZED HEALTH CARE EDUCATION/TRAINING PROGRAM

## 2025-03-10 PROCEDURE — 73560 X-RAY EXAM OF KNEE 1 OR 2: CPT | Mod: RIGHT SIDE | Performed by: RADIOLOGY

## 2025-03-10 PROCEDURE — 0SRC0J9 REPLACEMENT OF RIGHT KNEE JOINT WITH SYNTHETIC SUBSTITUTE, CEMENTED, OPEN APPROACH: ICD-10-PCS | Performed by: STUDENT IN AN ORGANIZED HEALTH CARE EDUCATION/TRAINING PROGRAM

## 2025-03-10 PROCEDURE — 2500000001 HC RX 250 WO HCPCS SELF ADMINISTERED DRUGS (ALT 637 FOR MEDICARE OP): Performed by: ANESTHESIOLOGY

## 2025-03-10 PROCEDURE — 7100000011 HC EXTENDED STAY RECOVERY HOURLY - NURSING UNIT

## 2025-03-10 PROCEDURE — 2500000001 HC RX 250 WO HCPCS SELF ADMINISTERED DRUGS (ALT 637 FOR MEDICARE OP): Performed by: CLINICAL NURSE SPECIALIST

## 2025-03-10 PROCEDURE — 2500000004 HC RX 250 GENERAL PHARMACY W/ HCPCS (ALT 636 FOR OP/ED): Performed by: ANESTHESIOLOGY

## 2025-03-10 PROCEDURE — A27447 PR TOTAL KNEE ARTHROPLASTY: Performed by: ANESTHESIOLOGY

## 2025-03-10 PROCEDURE — 3700000001 HC GENERAL ANESTHESIA TIME - INITIAL BASE CHARGE: Performed by: STUDENT IN AN ORGANIZED HEALTH CARE EDUCATION/TRAINING PROGRAM

## 2025-03-10 PROCEDURE — C1713 ANCHOR/SCREW BN/BN,TIS/BN: HCPCS | Performed by: STUDENT IN AN ORGANIZED HEALTH CARE EDUCATION/TRAINING PROGRAM

## 2025-03-10 PROCEDURE — 3700000002 HC GENERAL ANESTHESIA TIME - EACH INCREMENTAL 1 MINUTE: Performed by: STUDENT IN AN ORGANIZED HEALTH CARE EDUCATION/TRAINING PROGRAM

## 2025-03-10 PROCEDURE — 27447 TOTAL KNEE ARTHROPLASTY: CPT | Performed by: STUDENT IN AN ORGANIZED HEALTH CARE EDUCATION/TRAINING PROGRAM

## 2025-03-10 PROCEDURE — 64999 UNLISTED PX NERVOUS SYSTEM: CPT | Performed by: ANESTHESIOLOGY

## 2025-03-10 PROCEDURE — 2500000001 HC RX 250 WO HCPCS SELF ADMINISTERED DRUGS (ALT 637 FOR MEDICARE OP): Performed by: STUDENT IN AN ORGANIZED HEALTH CARE EDUCATION/TRAINING PROGRAM

## 2025-03-10 PROCEDURE — 2780000003 HC OR 278 NO HCPCS: Performed by: STUDENT IN AN ORGANIZED HEALTH CARE EDUCATION/TRAINING PROGRAM

## 2025-03-10 PROCEDURE — 94640 AIRWAY INHALATION TREATMENT: CPT

## 2025-03-10 PROCEDURE — 97161 PT EVAL LOW COMPLEX 20 MIN: CPT | Mod: GP

## 2025-03-10 PROCEDURE — 2500000002 HC RX 250 W HCPCS SELF ADMINISTERED DRUGS (ALT 637 FOR MEDICARE OP, ALT 636 FOR OP/ED): Performed by: STUDENT IN AN ORGANIZED HEALTH CARE EDUCATION/TRAINING PROGRAM

## 2025-03-10 PROCEDURE — 7100000002 HC RECOVERY ROOM TIME - EACH INCREMENTAL 1 MINUTE: Performed by: STUDENT IN AN ORGANIZED HEALTH CARE EDUCATION/TRAINING PROGRAM

## 2025-03-10 PROCEDURE — 2720000007 HC OR 272 NO HCPCS: Performed by: STUDENT IN AN ORGANIZED HEALTH CARE EDUCATION/TRAINING PROGRAM

## 2025-03-10 PROCEDURE — 73560 X-RAY EXAM OF KNEE 1 OR 2: CPT | Mod: RT

## 2025-03-10 PROCEDURE — 7100000001 HC RECOVERY ROOM TIME - INITIAL BASE CHARGE: Performed by: STUDENT IN AN ORGANIZED HEALTH CARE EDUCATION/TRAINING PROGRAM

## 2025-03-10 PROCEDURE — 97165 OT EVAL LOW COMPLEX 30 MIN: CPT | Mod: GO

## 2025-03-10 PROCEDURE — 2500000004 HC RX 250 GENERAL PHARMACY W/ HCPCS (ALT 636 FOR OP/ED): Performed by: STUDENT IN AN ORGANIZED HEALTH CARE EDUCATION/TRAINING PROGRAM

## 2025-03-10 PROCEDURE — 36415 COLL VENOUS BLD VENIPUNCTURE: CPT | Performed by: STUDENT IN AN ORGANIZED HEALTH CARE EDUCATION/TRAINING PROGRAM

## 2025-03-10 PROCEDURE — 2500000004 HC RX 250 GENERAL PHARMACY W/ HCPCS (ALT 636 FOR OP/ED): Performed by: NURSE ANESTHETIST, CERTIFIED REGISTERED

## 2025-03-10 PROCEDURE — 80048 BASIC METABOLIC PNL TOTAL CA: CPT | Performed by: STUDENT IN AN ORGANIZED HEALTH CARE EDUCATION/TRAINING PROGRAM

## 2025-03-10 PROCEDURE — 2500000005 HC RX 250 GENERAL PHARMACY W/O HCPCS: Performed by: STUDENT IN AN ORGANIZED HEALTH CARE EDUCATION/TRAINING PROGRAM

## 2025-03-10 PROCEDURE — A27447 PR TOTAL KNEE ARTHROPLASTY: Performed by: NURSE ANESTHETIST, CERTIFIED REGISTERED

## 2025-03-10 PROCEDURE — 3600000010 HC OR TIME - EACH INCREMENTAL 1 MINUTE - PROCEDURE LEVEL FIVE: Performed by: STUDENT IN AN ORGANIZED HEALTH CARE EDUCATION/TRAINING PROGRAM

## 2025-03-10 DEVICE — ATTUNE KNEE SYSTEM TIBIAL BASE FIXED BEARING SIZE 6 CEMENTED
Type: IMPLANTABLE DEVICE | Site: KNEE | Status: FUNCTIONAL
Brand: ATTUNE

## 2025-03-10 DEVICE — ATTUNE PATELLA MEDIALIZED DOME 38MM CEMENTED AOX
Type: IMPLANTABLE DEVICE | Site: KNEE | Status: FUNCTIONAL
Brand: ATTUNE

## 2025-03-10 DEVICE — ATTUNE KNEE SYSTEM TIBIAL INSERT FIXED BEARING POSTERIOR STABILIZED 7 5MM AOX
Type: IMPLANTABLE DEVICE | Site: KNEE | Status: FUNCTIONAL
Brand: ATTUNE

## 2025-03-10 DEVICE — ATTUNE KNEE SYSTEM FEMORAL POSTERIOR STABILIZED SIZE 7 RIGHT CEMENTED
Type: IMPLANTABLE DEVICE | Site: KNEE | Status: FUNCTIONAL
Brand: ATTUNE

## 2025-03-10 DEVICE — IMPLANTABLE DEVICE
Type: IMPLANTABLE DEVICE | Site: KNEE | Status: FUNCTIONAL
Brand: BIOMET® BONE CEMENT R

## 2025-03-10 RX ORDER — ALBUTEROL SULFATE 0.83 MG/ML
2.5 SOLUTION RESPIRATORY (INHALATION)
Status: DISCONTINUED | OUTPATIENT
Start: 2025-03-11 | End: 2025-03-12

## 2025-03-10 RX ORDER — CEFAZOLIN SODIUM 2 G/100ML
2 INJECTION, SOLUTION INTRAVENOUS EVERY 8 HOURS
Status: COMPLETED | OUTPATIENT
Start: 2025-03-10 | End: 2025-03-11

## 2025-03-10 RX ORDER — IPRATROPIUM BROMIDE 0.5 MG/2.5ML
500 SOLUTION RESPIRATORY (INHALATION) ONCE
Status: COMPLETED | OUTPATIENT
Start: 2025-03-10 | End: 2025-03-10

## 2025-03-10 RX ORDER — DOCUSATE SODIUM 100 MG/1
100 CAPSULE, LIQUID FILLED ORAL 2 TIMES DAILY
Status: DISCONTINUED | OUTPATIENT
Start: 2025-03-10 | End: 2025-03-14 | Stop reason: HOSPADM

## 2025-03-10 RX ORDER — ONDANSETRON HYDROCHLORIDE 2 MG/ML
4 INJECTION, SOLUTION INTRAVENOUS ONCE AS NEEDED
Status: DISCONTINUED | OUTPATIENT
Start: 2025-03-10 | End: 2025-03-10 | Stop reason: HOSPADM

## 2025-03-10 RX ORDER — ONDANSETRON HYDROCHLORIDE 2 MG/ML
4 INJECTION, SOLUTION INTRAVENOUS EVERY 8 HOURS PRN
Status: DISCONTINUED | OUTPATIENT
Start: 2025-03-10 | End: 2025-03-14 | Stop reason: HOSPADM

## 2025-03-10 RX ORDER — LABETALOL HYDROCHLORIDE 5 MG/ML
10 INJECTION, SOLUTION INTRAVENOUS ONCE AS NEEDED
Status: DISCONTINUED | OUTPATIENT
Start: 2025-03-10 | End: 2025-03-10 | Stop reason: HOSPADM

## 2025-03-10 RX ORDER — TRANEXAMIC ACID 10 MG/ML
INJECTION, SOLUTION INTRAVENOUS AS NEEDED
Status: DISCONTINUED | OUTPATIENT
Start: 2025-03-10 | End: 2025-03-10

## 2025-03-10 RX ORDER — CELECOXIB 100 MG/1
200 CAPSULE ORAL ONCE
Status: DISCONTINUED | OUTPATIENT
Start: 2025-03-10 | End: 2025-03-10

## 2025-03-10 RX ORDER — IPRATROPIUM BROMIDE AND ALBUTEROL SULFATE 2.5; .5 MG/3ML; MG/3ML
3 SOLUTION RESPIRATORY (INHALATION) EVERY 6 HOURS PRN
Status: DISCONTINUED | OUTPATIENT
Start: 2025-03-10 | End: 2025-03-10

## 2025-03-10 RX ORDER — ACETAMINOPHEN 325 MG/1
650 TABLET ORAL ONCE
Status: COMPLETED | OUTPATIENT
Start: 2025-03-10 | End: 2025-03-10

## 2025-03-10 RX ORDER — CELECOXIB 100 MG/1
100 CAPSULE ORAL ONCE
Status: COMPLETED | OUTPATIENT
Start: 2025-03-10 | End: 2025-03-10

## 2025-03-10 RX ORDER — ALBUTEROL SULFATE 0.83 MG/ML
2.5 SOLUTION RESPIRATORY (INHALATION) ONCE
Status: DISCONTINUED | OUTPATIENT
Start: 2025-03-10 | End: 2025-03-10 | Stop reason: HOSPADM

## 2025-03-10 RX ORDER — SODIUM CHLORIDE, SODIUM LACTATE, POTASSIUM CHLORIDE, CALCIUM CHLORIDE 600; 310; 30; 20 MG/100ML; MG/100ML; MG/100ML; MG/100ML
100 INJECTION, SOLUTION INTRAVENOUS CONTINUOUS
Status: ACTIVE | OUTPATIENT
Start: 2025-03-10 | End: 2025-03-11

## 2025-03-10 RX ORDER — OXYCODONE HYDROCHLORIDE 5 MG/1
10 TABLET ORAL EVERY 4 HOURS PRN
Status: DISCONTINUED | OUTPATIENT
Start: 2025-03-10 | End: 2025-03-12

## 2025-03-10 RX ORDER — KETOROLAC TROMETHAMINE 30 MG/ML
15 INJECTION, SOLUTION INTRAMUSCULAR; INTRAVENOUS EVERY 6 HOURS
Status: COMPLETED | OUTPATIENT
Start: 2025-03-10 | End: 2025-03-11

## 2025-03-10 RX ORDER — ACETAMINOPHEN 325 MG/1
650 TABLET ORAL EVERY 4 HOURS PRN
Status: DISCONTINUED | OUTPATIENT
Start: 2025-03-10 | End: 2025-03-10 | Stop reason: HOSPADM

## 2025-03-10 RX ORDER — POLYETHYLENE GLYCOL 3350 17 G/17G
17 POWDER, FOR SOLUTION ORAL DAILY
Status: DISCONTINUED | OUTPATIENT
Start: 2025-03-10 | End: 2025-03-14 | Stop reason: HOSPADM

## 2025-03-10 RX ORDER — ACETAMINOPHEN 325 MG/1
650 TABLET ORAL ONCE
Status: DISCONTINUED | OUTPATIENT
Start: 2025-03-10 | End: 2025-03-10

## 2025-03-10 RX ORDER — GABAPENTIN 300 MG/1
300 CAPSULE ORAL ONCE
Status: DISCONTINUED | OUTPATIENT
Start: 2025-03-10 | End: 2025-03-10 | Stop reason: HOSPADM

## 2025-03-10 RX ORDER — OXYCODONE HYDROCHLORIDE 5 MG/1
5 TABLET ORAL EVERY 6 HOURS PRN
Status: DISCONTINUED | OUTPATIENT
Start: 2025-03-10 | End: 2025-03-12

## 2025-03-10 RX ORDER — PROCHLORPERAZINE EDISYLATE 5 MG/ML
5 INJECTION INTRAMUSCULAR; INTRAVENOUS ONCE AS NEEDED
Status: DISCONTINUED | OUTPATIENT
Start: 2025-03-10 | End: 2025-03-10 | Stop reason: HOSPADM

## 2025-03-10 RX ORDER — IPRATROPIUM BROMIDE AND ALBUTEROL SULFATE 2.5; .5 MG/3ML; MG/3ML
3 SOLUTION RESPIRATORY (INHALATION) EVERY 2 HOUR PRN
Status: DISCONTINUED | OUTPATIENT
Start: 2025-03-10 | End: 2025-03-12

## 2025-03-10 RX ORDER — ALBUTEROL SULFATE 0.83 MG/ML
2.5 SOLUTION RESPIRATORY (INHALATION) ONCE AS NEEDED
Status: DISCONTINUED | OUTPATIENT
Start: 2025-03-10 | End: 2025-03-10 | Stop reason: HOSPADM

## 2025-03-10 RX ORDER — DILTIAZEM HYDROCHLORIDE 240 MG/1
240 CAPSULE, COATED, EXTENDED RELEASE ORAL DAILY
Status: DISCONTINUED | OUTPATIENT
Start: 2025-03-10 | End: 2025-03-12

## 2025-03-10 RX ORDER — FORMOTEROL FUMARATE 20 UG/2ML
20 SOLUTION RESPIRATORY (INHALATION)
Status: DISCONTINUED | OUTPATIENT
Start: 2025-03-10 | End: 2025-03-14 | Stop reason: HOSPADM

## 2025-03-10 RX ORDER — MIDAZOLAM HYDROCHLORIDE 1 MG/ML
INJECTION, SOLUTION INTRAMUSCULAR; INTRAVENOUS AS NEEDED
Status: DISCONTINUED | OUTPATIENT
Start: 2025-03-10 | End: 2025-03-10

## 2025-03-10 RX ORDER — PROPOFOL 10 MG/ML
INJECTION, EMULSION INTRAVENOUS AS NEEDED
Status: DISCONTINUED | OUTPATIENT
Start: 2025-03-10 | End: 2025-03-10

## 2025-03-10 RX ORDER — ALBUTEROL SULFATE 0.83 MG/ML
2.5 SOLUTION RESPIRATORY (INHALATION) EVERY 2 HOUR PRN
Status: DISCONTINUED | OUTPATIENT
Start: 2025-03-10 | End: 2025-03-11

## 2025-03-10 RX ORDER — SODIUM CHLORIDE, SODIUM LACTATE, POTASSIUM CHLORIDE, CALCIUM CHLORIDE 600; 310; 30; 20 MG/100ML; MG/100ML; MG/100ML; MG/100ML
100 INJECTION, SOLUTION INTRAVENOUS CONTINUOUS
Status: DISCONTINUED | OUTPATIENT
Start: 2025-03-10 | End: 2025-03-10 | Stop reason: HOSPADM

## 2025-03-10 RX ORDER — ACETAMINOPHEN 325 MG/1
975 TABLET ORAL EVERY 6 HOURS
Status: DISCONTINUED | OUTPATIENT
Start: 2025-03-10 | End: 2025-03-14 | Stop reason: HOSPADM

## 2025-03-10 RX ORDER — ONDANSETRON HYDROCHLORIDE 2 MG/ML
INJECTION, SOLUTION INTRAVENOUS AS NEEDED
Status: DISCONTINUED | OUTPATIENT
Start: 2025-03-10 | End: 2025-03-10

## 2025-03-10 RX ORDER — ALBUTEROL SULFATE 0.83 MG/ML
2.5 SOLUTION RESPIRATORY (INHALATION)
Status: DISCONTINUED | OUTPATIENT
Start: 2025-03-11 | End: 2025-03-10

## 2025-03-10 RX ORDER — DEXAMETHASONE SODIUM PHOSPHATE 10 MG/ML
6 INJECTION INTRAMUSCULAR; INTRAVENOUS ONCE
Status: DISCONTINUED | OUTPATIENT
Start: 2025-03-10 | End: 2025-03-10 | Stop reason: HOSPADM

## 2025-03-10 RX ORDER — CEFAZOLIN 1 G/1
INJECTION, POWDER, FOR SOLUTION INTRAVENOUS AS NEEDED
Status: DISCONTINUED | OUTPATIENT
Start: 2025-03-10 | End: 2025-03-10

## 2025-03-10 RX ORDER — TAMSULOSIN HYDROCHLORIDE 0.4 MG/1
0.4 CAPSULE ORAL DAILY
Status: DISCONTINUED | OUTPATIENT
Start: 2025-03-10 | End: 2025-03-14 | Stop reason: HOSPADM

## 2025-03-10 RX ORDER — TRAMADOL HYDROCHLORIDE 50 MG/1
50 TABLET ORAL ONCE
Status: COMPLETED | OUTPATIENT
Start: 2025-03-10 | End: 2025-03-10

## 2025-03-10 RX ORDER — SODIUM CHLORIDE 0.9 G/100ML
INJECTION, SOLUTION IRRIGATION AS NEEDED
Status: DISCONTINUED | OUTPATIENT
Start: 2025-03-10 | End: 2025-03-10 | Stop reason: HOSPADM

## 2025-03-10 RX ORDER — MIDAZOLAM HYDROCHLORIDE 1 MG/ML
INJECTION, SOLUTION INTRAMUSCULAR; INTRAVENOUS
Status: COMPLETED
Start: 2025-03-10 | End: 2025-03-10

## 2025-03-10 RX ORDER — TRAZODONE HYDROCHLORIDE 50 MG/1
100 TABLET ORAL NIGHTLY
Status: DISCONTINUED | OUTPATIENT
Start: 2025-03-10 | End: 2025-03-14 | Stop reason: HOSPADM

## 2025-03-10 RX ORDER — FLUTICASONE PROPIONATE 50 MCG
1 SPRAY, SUSPENSION (ML) NASAL DAILY PRN
Status: DISCONTINUED | OUTPATIENT
Start: 2025-03-10 | End: 2025-03-14 | Stop reason: HOSPADM

## 2025-03-10 RX ORDER — FLUOXETINE HYDROCHLORIDE 20 MG/1
60 CAPSULE ORAL DAILY
Status: DISCONTINUED | OUTPATIENT
Start: 2025-03-11 | End: 2025-03-13

## 2025-03-10 RX ORDER — IPRATROPIUM BROMIDE 0.5 MG/2.5ML
2.5 SOLUTION RESPIRATORY (INHALATION) ONCE
Status: DISCONTINUED | OUTPATIENT
Start: 2025-03-10 | End: 2025-03-10 | Stop reason: HOSPADM

## 2025-03-10 RX ORDER — GABAPENTIN 300 MG/1
300 CAPSULE ORAL ONCE
Status: COMPLETED | OUTPATIENT
Start: 2025-03-10 | End: 2025-03-10

## 2025-03-10 RX ORDER — ALBUTEROL SULFATE 90 UG/1
2 INHALANT RESPIRATORY (INHALATION) EVERY 6 HOURS PRN
Status: DISCONTINUED | OUTPATIENT
Start: 2025-03-10 | End: 2025-03-10

## 2025-03-10 RX ORDER — DIPHENHYDRAMINE HYDROCHLORIDE 50 MG/ML
12.5 INJECTION, SOLUTION INTRAMUSCULAR; INTRAVENOUS ONCE AS NEEDED
Status: DISCONTINUED | OUTPATIENT
Start: 2025-03-10 | End: 2025-03-10 | Stop reason: HOSPADM

## 2025-03-10 RX ORDER — VANCOMYCIN HYDROCHLORIDE 1 G/200ML
1000 INJECTION, SOLUTION INTRAVENOUS ONCE
Status: COMPLETED | OUTPATIENT
Start: 2025-03-10 | End: 2025-03-10

## 2025-03-10 RX ORDER — MEPERIDINE HYDROCHLORIDE 50 MG/ML
12.5 INJECTION INTRAMUSCULAR; INTRAVENOUS; SUBCUTANEOUS EVERY 10 MIN PRN
Status: DISCONTINUED | OUTPATIENT
Start: 2025-03-10 | End: 2025-03-10 | Stop reason: HOSPADM

## 2025-03-10 RX ORDER — GABAPENTIN 300 MG/1
300 CAPSULE ORAL 3 TIMES DAILY
Status: DISCONTINUED | OUTPATIENT
Start: 2025-03-10 | End: 2025-03-11

## 2025-03-10 RX ORDER — OLANZAPINE 5 MG/1
7.5 TABLET ORAL NIGHTLY
Status: DISCONTINUED | OUTPATIENT
Start: 2025-03-11 | End: 2025-03-14 | Stop reason: HOSPADM

## 2025-03-10 RX ORDER — LITHIUM CARBONATE 300 MG/1
300 CAPSULE ORAL 2 TIMES DAILY
Status: DISCONTINUED | OUTPATIENT
Start: 2025-03-10 | End: 2025-03-14 | Stop reason: HOSPADM

## 2025-03-10 RX ORDER — FENTANYL CITRATE 50 UG/ML
INJECTION, SOLUTION INTRAMUSCULAR; INTRAVENOUS
Status: COMPLETED
Start: 2025-03-10 | End: 2025-03-10

## 2025-03-10 RX ORDER — BISACODYL 10 MG/1
10 SUPPOSITORY RECTAL DAILY PRN
Status: DISCONTINUED | OUTPATIENT
Start: 2025-03-10 | End: 2025-03-14 | Stop reason: HOSPADM

## 2025-03-10 RX ORDER — ACETAMINOPHEN 325 MG/1
975 TABLET ORAL EVERY 8 HOURS
Status: DISCONTINUED | OUTPATIENT
Start: 2025-03-10 | End: 2025-03-10

## 2025-03-10 RX ORDER — ONDANSETRON 4 MG/1
4 TABLET, ORALLY DISINTEGRATING ORAL EVERY 8 HOURS PRN
Status: DISCONTINUED | OUTPATIENT
Start: 2025-03-10 | End: 2025-03-14 | Stop reason: HOSPADM

## 2025-03-10 RX ORDER — PANTOPRAZOLE SODIUM 40 MG/1
40 TABLET, DELAYED RELEASE ORAL
Status: DISCONTINUED | OUTPATIENT
Start: 2025-03-11 | End: 2025-03-14 | Stop reason: HOSPADM

## 2025-03-10 RX ORDER — LIDOCAINE HYDROCHLORIDE 10 MG/ML
0.1 INJECTION, SOLUTION INFILTRATION; PERINEURAL ONCE
Status: DISCONTINUED | OUTPATIENT
Start: 2025-03-10 | End: 2025-03-10 | Stop reason: HOSPADM

## 2025-03-10 RX ORDER — NALOXONE HYDROCHLORIDE 1 MG/ML
0.2 INJECTION INTRAMUSCULAR; INTRAVENOUS; SUBCUTANEOUS EVERY 5 MIN PRN
Status: DISCONTINUED | OUTPATIENT
Start: 2025-03-10 | End: 2025-03-14 | Stop reason: HOSPADM

## 2025-03-10 RX ORDER — HYDRALAZINE HYDROCHLORIDE 20 MG/ML
10 INJECTION INTRAMUSCULAR; INTRAVENOUS EVERY 30 MIN PRN
Status: DISCONTINUED | OUTPATIENT
Start: 2025-03-10 | End: 2025-03-10 | Stop reason: HOSPADM

## 2025-03-10 RX ORDER — HYDROMORPHONE HYDROCHLORIDE 1 MG/ML
1 INJECTION, SOLUTION INTRAMUSCULAR; INTRAVENOUS; SUBCUTANEOUS EVERY 5 MIN PRN
Status: DISCONTINUED | OUTPATIENT
Start: 2025-03-10 | End: 2025-03-10 | Stop reason: HOSPADM

## 2025-03-10 RX ORDER — GABAPENTIN 300 MG/1
300 CAPSULE ORAL ONCE
Status: DISCONTINUED | OUTPATIENT
Start: 2025-03-10 | End: 2025-03-10

## 2025-03-10 RX ORDER — ASPIRIN 81 MG/1
81 TABLET ORAL 2 TIMES DAILY
Status: DISCONTINUED | OUTPATIENT
Start: 2025-03-10 | End: 2025-03-14 | Stop reason: HOSPADM

## 2025-03-10 RX ORDER — FENTANYL CITRATE 50 UG/ML
INJECTION, SOLUTION INTRAMUSCULAR; INTRAVENOUS AS NEEDED
Status: DISCONTINUED | OUTPATIENT
Start: 2025-03-10 | End: 2025-03-10

## 2025-03-10 RX ORDER — CYCLOBENZAPRINE HCL 10 MG
10 TABLET ORAL 3 TIMES DAILY PRN
Status: DISCONTINUED | OUTPATIENT
Start: 2025-03-10 | End: 2025-03-14 | Stop reason: HOSPADM

## 2025-03-10 RX ADMIN — ONDANSETRON 4 MG: 2 INJECTION INTRAMUSCULAR; INTRAVENOUS at 07:54

## 2025-03-10 RX ADMIN — ACETAMINOPHEN 975 MG: 325 TABLET, FILM COATED ORAL at 23:42

## 2025-03-10 RX ADMIN — ACETAMINOPHEN 650 MG: 325 TABLET, FILM COATED ORAL at 07:05

## 2025-03-10 RX ADMIN — CEFAZOLIN SODIUM 2 G: 2 INJECTION, SOLUTION INTRAVENOUS at 23:42

## 2025-03-10 RX ADMIN — TRANEXAMIC ACID 1000 MG: 10 INJECTION, SOLUTION INTRAVENOUS at 09:35

## 2025-03-10 RX ADMIN — MIDAZOLAM 2 MG: 1 INJECTION INTRAMUSCULAR; INTRAVENOUS at 07:39

## 2025-03-10 RX ADMIN — PROPOFOL 100 MCG/KG/MIN: 10 INJECTION, EMULSION INTRAVENOUS at 07:45

## 2025-03-10 RX ADMIN — DEXAMETHASONE SODIUM PHOSPHATE 4 MG: 4 INJECTION, SOLUTION INTRAMUSCULAR; INTRAVENOUS at 07:54

## 2025-03-10 RX ADMIN — POVIDONE-IODINE 1 APPLICATION: 5 SOLUTION TOPICAL at 07:07

## 2025-03-10 RX ADMIN — IPRATROPIUM BROMIDE AND ALBUTEROL SULFATE 3 ML: .5; 3 SOLUTION RESPIRATORY (INHALATION) at 17:25

## 2025-03-10 RX ADMIN — ASPIRIN 81 MG: 81 TABLET, COATED ORAL at 17:20

## 2025-03-10 RX ADMIN — PROPOFOL 50 MG: 10 INJECTION, EMULSION INTRAVENOUS at 07:44

## 2025-03-10 RX ADMIN — ACETAMINOPHEN 975 MG: 325 TABLET, FILM COATED ORAL at 17:49

## 2025-03-10 RX ADMIN — TRAZODONE HYDROCHLORIDE 100 MG: 50 TABLET ORAL at 23:42

## 2025-03-10 RX ADMIN — CELECOXIB 100 MG: 100 CAPSULE ORAL at 07:05

## 2025-03-10 RX ADMIN — ASPIRIN 81 MG: 81 TABLET, COATED ORAL at 21:11

## 2025-03-10 RX ADMIN — SODIUM CHLORIDE, POTASSIUM CHLORIDE, SODIUM LACTATE AND CALCIUM CHLORIDE: 600; 310; 30; 20 INJECTION, SOLUTION INTRAVENOUS at 07:36

## 2025-03-10 RX ADMIN — SODIUM CHLORIDE, POTASSIUM CHLORIDE, SODIUM LACTATE AND CALCIUM CHLORIDE 100 ML/HR: 600; 310; 30; 20 INJECTION, SOLUTION INTRAVENOUS at 12:52

## 2025-03-10 RX ADMIN — DOCUSATE SODIUM 100 MG: 100 CAPSULE, LIQUID FILLED ORAL at 12:52

## 2025-03-10 RX ADMIN — IPRATROPIUM BROMIDE AND ALBUTEROL SULFATE 3 ML: .5; 3 SOLUTION RESPIRATORY (INHALATION) at 20:33

## 2025-03-10 RX ADMIN — CEFAZOLIN 2 G: 1 INJECTION, POWDER, FOR SOLUTION INTRAMUSCULAR; INTRAVENOUS at 07:45

## 2025-03-10 RX ADMIN — CEFAZOLIN SODIUM 2 G: 2 INJECTION, SOLUTION INTRAVENOUS at 15:01

## 2025-03-10 RX ADMIN — TRAMADOL HYDROCHLORIDE 50 MG: 50 TABLET, COATED ORAL at 07:05

## 2025-03-10 RX ADMIN — GABAPENTIN 300 MG: 300 CAPSULE ORAL at 21:11

## 2025-03-10 RX ADMIN — KETOROLAC TROMETHAMINE 15 MG: 30 INJECTION, SOLUTION INTRAMUSCULAR at 23:42

## 2025-03-10 RX ADMIN — IPRATROPIUM BROMIDE 500 MCG: 0.5 SOLUTION RESPIRATORY (INHALATION) at 10:30

## 2025-03-10 RX ADMIN — GABAPENTIN 300 MG: 300 CAPSULE ORAL at 07:05

## 2025-03-10 RX ADMIN — TRANEXAMIC ACID 1000 MG: 10 INJECTION, SOLUTION INTRAVENOUS at 07:56

## 2025-03-10 RX ADMIN — KETOROLAC TROMETHAMINE 15 MG: 30 INJECTION, SOLUTION INTRAMUSCULAR at 15:01

## 2025-03-10 RX ADMIN — POLYETHYLENE GLYCOL 3350 17 G: 17 POWDER, FOR SOLUTION ORAL at 12:47

## 2025-03-10 RX ADMIN — FORMOTEROL FUMARATE DIHYDRATE 20 MCG: 20 SOLUTION RESPIRATORY (INHALATION) at 20:37

## 2025-03-10 RX ADMIN — KETOROLAC TROMETHAMINE 15 MG: 30 INJECTION, SOLUTION INTRAMUSCULAR at 21:11

## 2025-03-10 RX ADMIN — LITHIUM CARBONATE 300 MG: 300 CAPSULE, GELATIN COATED ORAL at 21:11

## 2025-03-10 RX ADMIN — GABAPENTIN 300 MG: 300 CAPSULE ORAL at 15:01

## 2025-03-10 RX ADMIN — VANCOMYCIN HYDROCHLORIDE 1000 MG: 1 INJECTION, SOLUTION INTRAVENOUS at 07:05

## 2025-03-10 SDOH — SOCIAL STABILITY: SOCIAL INSECURITY: HAS ANYONE EVER THREATENED TO HURT YOUR FAMILY OR YOUR PETS?: NO

## 2025-03-10 SDOH — ECONOMIC STABILITY: FOOD INSECURITY: WITHIN THE PAST 12 MONTHS, YOU WORRIED THAT YOUR FOOD WOULD RUN OUT BEFORE YOU GOT THE MONEY TO BUY MORE.: NEVER TRUE

## 2025-03-10 SDOH — SOCIAL STABILITY: SOCIAL INSECURITY: ARE YOU OR HAVE YOU BEEN THREATENED OR ABUSED PHYSICALLY, EMOTIONALLY, OR SEXUALLY BY ANYONE?: NO

## 2025-03-10 SDOH — SOCIAL STABILITY: SOCIAL INSECURITY: HAVE YOU HAD THOUGHTS OF HARMING ANYONE ELSE?: NO

## 2025-03-10 SDOH — SOCIAL STABILITY: SOCIAL INSECURITY: DOES ANYONE TRY TO KEEP YOU FROM HAVING/CONTACTING OTHER FRIENDS OR DOING THINGS OUTSIDE YOUR HOME?: NO

## 2025-03-10 SDOH — ECONOMIC STABILITY: HOUSING INSECURITY: IN THE LAST 12 MONTHS, WAS THERE A TIME WHEN YOU WERE NOT ABLE TO PAY THE MORTGAGE OR RENT ON TIME?: NO

## 2025-03-10 SDOH — HEALTH STABILITY: MENTAL HEALTH: CURRENT SMOKER: 0

## 2025-03-10 SDOH — ECONOMIC STABILITY: HOUSING INSECURITY: IN THE PAST 12 MONTHS, HOW MANY TIMES HAVE YOU MOVED WHERE YOU WERE LIVING?: 0

## 2025-03-10 SDOH — ECONOMIC STABILITY: TRANSPORTATION INSECURITY: IN THE PAST 12 MONTHS, HAS LACK OF TRANSPORTATION KEPT YOU FROM MEDICAL APPOINTMENTS OR FROM GETTING MEDICATIONS?: NO

## 2025-03-10 SDOH — SOCIAL STABILITY: SOCIAL INSECURITY: WITHIN THE LAST YEAR, HAVE YOU BEEN HUMILIATED OR EMOTIONALLY ABUSED IN OTHER WAYS BY YOUR PARTNER OR EX-PARTNER?: NO

## 2025-03-10 SDOH — ECONOMIC STABILITY: INCOME INSECURITY: IN THE PAST 12 MONTHS HAS THE ELECTRIC, GAS, OIL, OR WATER COMPANY THREATENED TO SHUT OFF SERVICES IN YOUR HOME?: NO

## 2025-03-10 SDOH — SOCIAL STABILITY: SOCIAL INSECURITY
WITHIN THE LAST YEAR, HAVE YOU BEEN KICKED, HIT, SLAPPED, OR OTHERWISE PHYSICALLY HURT BY YOUR PARTNER OR EX-PARTNER?: NO

## 2025-03-10 SDOH — ECONOMIC STABILITY: HOUSING INSECURITY: AT ANY TIME IN THE PAST 12 MONTHS, WERE YOU HOMELESS OR LIVING IN A SHELTER (INCLUDING NOW)?: NO

## 2025-03-10 SDOH — SOCIAL STABILITY: SOCIAL INSECURITY
WITHIN THE LAST YEAR, HAVE YOU BEEN RAPED OR FORCED TO HAVE ANY KIND OF SEXUAL ACTIVITY BY YOUR PARTNER OR EX-PARTNER?: NO

## 2025-03-10 SDOH — SOCIAL STABILITY: SOCIAL INSECURITY: WERE YOU ABLE TO COMPLETE ALL THE BEHAVIORAL HEALTH SCREENINGS?: YES

## 2025-03-10 SDOH — SOCIAL STABILITY: SOCIAL INSECURITY: DO YOU FEEL ANYONE HAS EXPLOITED OR TAKEN ADVANTAGE OF YOU FINANCIALLY OR OF YOUR PERSONAL PROPERTY?: NO

## 2025-03-10 SDOH — SOCIAL STABILITY: SOCIAL INSECURITY: WITHIN THE LAST YEAR, HAVE YOU BEEN AFRAID OF YOUR PARTNER OR EX-PARTNER?: NO

## 2025-03-10 SDOH — ECONOMIC STABILITY: FOOD INSECURITY: WITHIN THE PAST 12 MONTHS, THE FOOD YOU BOUGHT JUST DIDN'T LAST AND YOU DIDN'T HAVE MONEY TO GET MORE.: NEVER TRUE

## 2025-03-10 SDOH — SOCIAL STABILITY: SOCIAL INSECURITY: DO YOU FEEL UNSAFE GOING BACK TO THE PLACE WHERE YOU ARE LIVING?: NO

## 2025-03-10 SDOH — SOCIAL STABILITY: SOCIAL INSECURITY: HAVE YOU HAD ANY THOUGHTS OF HARMING ANYONE ELSE?: NO

## 2025-03-10 SDOH — ECONOMIC STABILITY: FOOD INSECURITY: HOW HARD IS IT FOR YOU TO PAY FOR THE VERY BASICS LIKE FOOD, HOUSING, MEDICAL CARE, AND HEATING?: NOT HARD AT ALL

## 2025-03-10 SDOH — SOCIAL STABILITY: SOCIAL INSECURITY: ABUSE: ADULT

## 2025-03-10 SDOH — SOCIAL STABILITY: SOCIAL INSECURITY: ARE THERE ANY APPARENT SIGNS OF INJURIES/BEHAVIORS THAT COULD BE RELATED TO ABUSE/NEGLECT?: NO

## 2025-03-10 ASSESSMENT — COLUMBIA-SUICIDE SEVERITY RATING SCALE - C-SSRS
6. HAVE YOU EVER DONE ANYTHING, STARTED TO DO ANYTHING, OR PREPARED TO DO ANYTHING TO END YOUR LIFE?: NO
2. HAVE YOU ACTUALLY HAD ANY THOUGHTS OF KILLING YOURSELF?: NO
6. HAVE YOU EVER DONE ANYTHING, STARTED TO DO ANYTHING, OR PREPARED TO DO ANYTHING TO END YOUR LIFE?: NO
1. IN THE PAST MONTH, HAVE YOU WISHED YOU WERE DEAD OR WISHED YOU COULD GO TO SLEEP AND NOT WAKE UP?: NO
2. HAVE YOU ACTUALLY HAD ANY THOUGHTS OF KILLING YOURSELF?: NO
1. IN THE PAST MONTH, HAVE YOU WISHED YOU WERE DEAD OR WISHED YOU COULD GO TO SLEEP AND NOT WAKE UP?: NO

## 2025-03-10 ASSESSMENT — ACTIVITIES OF DAILY LIVING (ADL)
HEARING - LEFT EAR: FUNCTIONAL
HEARING - RIGHT EAR: FUNCTIONAL
WALKS IN HOME: NEEDS ASSISTANCE
JUDGMENT_ADEQUATE_SAFELY_COMPLETE_DAILY_ACTIVITIES: YES
FEEDING YOURSELF: INDEPENDENT
GROOMING: INDEPENDENT
ASSISTIVE_DEVICE: WALKER
DRESSING YOURSELF: NEEDS ASSISTANCE
BATHING: NEEDS ASSISTANCE
TOILETING: NEEDS ASSISTANCE
PATIENT'S MEMORY ADEQUATE TO SAFELY COMPLETE DAILY ACTIVITIES?: YES
ADEQUATE_TO_COMPLETE_ADL: YES
LACK_OF_TRANSPORTATION: NO

## 2025-03-10 ASSESSMENT — PAIN SCALES - GENERAL
PAINLEVEL_OUTOF10: 0 - NO PAIN
PAINLEVEL_OUTOF10: 3
PAINLEVEL_OUTOF10: 5 - MODERATE PAIN
PAINLEVEL_OUTOF10: 10 - WORST POSSIBLE PAIN
PAINLEVEL_OUTOF10: 0 - NO PAIN
PAIN_LEVEL: 0
PAINLEVEL_OUTOF10: 0 - NO PAIN
PAINLEVEL_OUTOF10: 10 - WORST POSSIBLE PAIN
PAINLEVEL_OUTOF10: 0 - NO PAIN

## 2025-03-10 ASSESSMENT — COGNITIVE AND FUNCTIONAL STATUS - GENERAL
DRESSING REGULAR UPPER BODY CLOTHING: A LITTLE
MOVING TO AND FROM BED TO CHAIR: A LITTLE
CLIMB 3 TO 5 STEPS WITH RAILING: A LITTLE
DAILY ACTIVITIY SCORE: 18
TURNING FROM BACK TO SIDE WHILE IN FLAT BAD: A LITTLE
MOVING FROM LYING ON BACK TO SITTING ON SIDE OF FLAT BED WITH BEDRAILS: A LITTLE
DRESSING REGULAR LOWER BODY CLOTHING: A LOT
TOILETING: A LOT
WALKING IN HOSPITAL ROOM: A LITTLE
HELP NEEDED FOR BATHING: A LOT
TOILETING: A LITTLE
DRESSING REGULAR LOWER BODY CLOTHING: A LOT
WALKING IN HOSPITAL ROOM: A LITTLE
CLIMB 3 TO 5 STEPS WITH RAILING: A LOT
STANDING UP FROM CHAIR USING ARMS: A LITTLE
MOBILITY SCORE: 18
MOVING TO AND FROM BED TO CHAIR: A LITTLE
DAILY ACTIVITIY SCORE: 18
HELP NEEDED FOR BATHING: A LITTLE
STANDING UP FROM CHAIR USING ARMS: A LITTLE
PERSONAL GROOMING: A LITTLE
MOVING FROM LYING ON BACK TO SITTING ON SIDE OF FLAT BED WITH BEDRAILS: A LITTLE
MOBILITY SCORE: 17
PATIENT BASELINE BEDBOUND: NO
TURNING FROM BACK TO SIDE WHILE IN FLAT BAD: A LITTLE

## 2025-03-10 ASSESSMENT — PATIENT HEALTH QUESTIONNAIRE - PHQ9
SUM OF ALL RESPONSES TO PHQ9 QUESTIONS 1 & 2: 0
1. LITTLE INTEREST OR PLEASURE IN DOING THINGS: NOT AT ALL
2. FEELING DOWN, DEPRESSED OR HOPELESS: NOT AT ALL

## 2025-03-10 ASSESSMENT — PAIN - FUNCTIONAL ASSESSMENT
PAIN_FUNCTIONAL_ASSESSMENT: 0-10

## 2025-03-10 ASSESSMENT — LIFESTYLE VARIABLES
AUDIT-C TOTAL SCORE: 0
SKIP TO QUESTIONS 9-10: 1
AUDIT-C TOTAL SCORE: 0
HOW OFTEN DO YOU HAVE A DRINK CONTAINING ALCOHOL: NEVER
HOW MANY STANDARD DRINKS CONTAINING ALCOHOL DO YOU HAVE ON A TYPICAL DAY: PATIENT DOES NOT DRINK
HOW OFTEN DO YOU HAVE 6 OR MORE DRINKS ON ONE OCCASION: NEVER

## 2025-03-10 NOTE — PROGRESS NOTES
Orthopaedic Surgery Progress Note     DOS: 3/10/2025  Right total knee arthroplasty     Subjective: Patient resting comfortably in PACU. No cp, sob. No complaints. Pain controlled.      Objective:   Awake and alert  Normal work of breathing     RLE:  Dressing dry  Complete motor sensory block s/p spinal anesthesia  Palpable DP pulse     A/P: 64-year-old male s/p right total knee arthroplasty, progressing well.   - WBAT RLE, walker at all times  - POD1 CBC/BMP  - DVT ppx: ASA 81 mg BID 4 weeks  - Discharge pending PT/OT, medical stability     Geoffrey Urrutia MD  Department of Orthopaedic Surgery  Division of Adult Reconstruction  , [facility]

## 2025-03-10 NOTE — ANESTHESIA PROCEDURE NOTES
Peripheral Block    Patient location during procedure: pre-op  Start time: 3/10/2025 7:09 AM  End time: 3/10/2025 7:19 AM  Reason for block: at surgeon's request and post-op pain management  Staffing  Performed: attending   Authorized by: Minor Chopra MD    Performed by: Minor Chopra MD  Preanesthetic Checklist  Completed: patient identified, IV checked, site marked, risks and benefits discussed, surgical consent, monitors and equipment checked, pre-op evaluation and timeout performed   Timeout performed at: 3/10/2025 7:09 AM  Peripheral Block  Patient position: laying flat  Prep: ChloraPrep  Patient monitoring: heart rate, cardiac monitor and continuous pulse ox  Block type: adductor canal  Laterality: right  Injection technique: single-shot  Guidance: ultrasound guided  Local infiltration: ropivacaine  Infiltration strength: 0.5 %  Dose: 20 mL  Needle  Needle gauge: 21 G  Needle length: 10 cm  Needle localization: ultrasound guidance  Test dose: negative  Assessment  Injection assessment: negative aspiration for heme, no paresthesia on injection, incremental injection and local visualized surrounding nerve on ultrasound  Paresthesia pain: none  Heart rate change: no  Slow fractionated injection: yes  Additional Notes  Versed 2mg and Fentanyl 100 mcg IV given prior to block

## 2025-03-10 NOTE — H&P
Department of Orthopaedic Surgery  Division of Adult Reconstruction     Chief Complaint: Right knee pain     HPI:  Patient presents to clinic for scheduled surgery. Denies changes to medical history,    Previous HPI:  Trip Brown is a pleasant 64 y.o. year-old male who is seen today for evaluation of right knee pain.  Patient complains of right knee pain for several years with increasing severity.  He reports considerable difficulty with traversing stairs and uneven surfaces.  He has tried extensive nonoperative management in the form of medication, activity modification, corticosteroid injection and hyaluronic acid injection.  At this juncture he is interested in surgery.     Trip lives in sober living.  He has been sober for the past 5 years.  He is on disability.  In his spare time he enjoys working on motorcycles.  He quit smoking several years ago.     Review of Symptoms:  The patient denies any fever, chills, chest pain, shortness of breath or difficulty breathing.  Patient denies any numbness, tingling, or radicular symptoms.       Adult patient history sheet was filled out by the patient today in clinic and will be scanned into the EMR.  I personally reviewed this form which will be scanned into the EMR.  This includes Past Medical History, Past Surgical History, Medications, Allergies, Social History, Family History and 12 point review of systems.     Past Medical History:     Medical History        Past Medical History:   Diagnosis Date    Asthma      Bipolar disorder      CKD (chronic kidney disease)      COPD (chronic obstructive pulmonary disease) (Multi)      Polysubstance abuse (Multi)      SVT (supraventricular tachycardia) (CMS-HCC) 04/2023     s/p ablation    Thrombocytopenia (CMS-HCC)              Past Surgical History:     Surgical History         Past Surgical History:   Procedure Laterality Date    HAND SURGERY Left      LUMBAR SPINE SURGERY                Allergies:     Allergies         Allergies   Allergen Reactions    Bupropion Hallucinations            Medications:     Medications Ordered Prior to Encounter          Current Outpatient Medications on File Prior to Visit   Medication Sig Dispense Refill    amantadine (Symmetrel) 100 mg capsule Take 1 capsule (100 mg) by mouth 2 times a day.        aspirin 81 mg EC tablet Take 1 tablet (81 mg) by mouth once daily.        atorvastatin (Lipitor) 40 mg tablet Take 1 tablet (40 mg) by mouth once daily.        cholecalciferol (Vitamin D-3) 25 MCG (1000 UT) tablet Take 1 tablet (25 mcg) by mouth once daily.        clindamycin (Cleocin T) 1 % external solution Apply topically twice a day.        cyclobenzaprine (Flexeril) 10 mg tablet Take 1 tablet (10 mg) by mouth 3 times a day as needed for muscle spasms for up to 10 days. 30 tablet 0    diclofenac sodium (Voltaren) 1 % gel gel Apply 4.5 inches (1 Application) topically 2 times a day as needed.        dilTIAZem CD (Cardizem CD) 240 mg 24 hr capsule Take 1 capsule (240 mg) by mouth once daily.        FLUoxetine (PROzac) 60 mg tablet Take 1 capsule by mouth once daily.        fluticasone (Flonase) 50 mcg/actuation nasal spray Administer 1 spray into affected nostril(s) once daily.        gabapentin (Neurontin) 300 mg capsule TAKE 1 CAPSULE (300 MG) BY MOUTH 3 TIMES A DAY. 90 capsule 2    hydrOXYzine HCL (Atarax) 25 mg tablet Take 1 tablet (25 mg) by mouth 2 times a day as needed.        ipratropium-albuteroL (Duo-Neb) 0.5-2.5 mg/3 mL nebulizer solution Inhale 3 mL every 6 hours if needed.        lidocaine (Lidoderm) 5 % patch Place 1 patch on the skin.        lithium 300 mg capsule Take 1 capsule (300 mg) by mouth twice a day.        metoprolol succinate XL (Toprol-XL) 25 mg 24 hr tablet Take 1 tablet (25 mg) by mouth once daily.        nicotine (Nicoderm CQ) 21 mg/24 hr patch Place 1 patch over 24 hours on the skin once every 24 hours. 30 patch 0    nicotine polacrilex (Commit) 4 mg lozenge Dissolve  "1 lozenge (4 mg) in the mouth every 2 hours if needed for smoking cessation. 100 lozenge 0    OLANZapine (ZyPREXA) 7.5 mg tablet Take 1 Tablet by mouth nightly at bedtime; Indications manic-depression        pantoprazole (ProtoNix) 40 mg EC tablet Take 1 tablet (40 mg) by mouth once daily in the morning. Take before meals.        tamsulosin (Flomax) 0.4 mg 24 hr capsule TAKE 1 CAPSULE (0.4 MG) BY MOUTH ONCE DAILY. 30 capsule 11    tiotropium (Spiriva) 18 mcg inhalation capsule Place 1 capsule (18 mcg) into inhaler and inhale once daily.        tiotropium-olodateroL (Stiolto Respimat) 2.5-2.5 mcg/actuation mist inhaler Inhale 2 Inhalations once daily. 1 g 3    traZODone (Desyrel) 100 mg tablet Take 0.5 tablets (50 mg) by mouth.        triamcinolone (Kenalog) 0.1 % cream Apply to affected area twice daily Monday-Friday. Take weekends off. Do not use on face, armpits, neck, or groin.        Ventolin HFA 90 mcg/actuation inhaler Inhale 2 puffs every 6 hours if needed for wheezing or shortness of breath.          No current facility-administered medications on file prior to visit.            Social History:     Social History            Occupational History    Not on file   Tobacco Use    Smoking status: Former       Current packs/day: 0.00       Average packs/day: 0.5 packs/day for 40.0 years (20.0 ttl pk-yrs)       Types: Cigarettes       Start date: 1984       Quit date: 2024       Years since quittin.9    Smokeless tobacco: Never   Vaping Use    Vaping status: Never Used   Substance and Sexual Activity    Alcohol use: Not Currently       Comment: sober since     Drug use: Yes       Types: \"Crack\" cocaine, Cocaine, Heroin       Comment: last used     Sexual activity: Not on file         Family History:  he   Family History          Family History   Problem Relation Name Age of Onset    Stroke Father                Exam:  General: Well-appearing male in no acute distress.  Awake, alert and " oriented.  Pleasant and cooperative.  Respiratory: Non-labored breathing  Mood: Euthymic   Gait: Antalgic  Assistive Device: None      Affected Right Knee  Limb Alignment: Valgus  ROM: 5-110  Stable to varus and valgus stress at full extension and 30 degrees of flexion  Skin: Intact, no abrasions or draining sinuses  Effusion: None  Quad Strength: 5/5  Hamstring Strength: 5/5  Patella Crepitus: None  Patella Grind: Positive  Tenderness: Lateral joint line  Sensation: Intact to light touch distally  Motor function: Able to fire TA, EHL, G/S  Pulses: Palpable DP pulse     Imaging interpretation:   AP/ lateral/ mid-flexion/sunrise views: Independent review of right knee x-rays was performed. The findings were reviewed with the patient. There are moderate degenerative changes of the right knee with valgus limb alignment. There is joint space narrowing, subchondral sclerosis, and osteophyte formation.     Assessment and Plan:  64-year-old male with debilitating right knee pain secondary to osteoarthritis with valgus deformity, refractory to multiple nonoperative measures.  We discussed his options which are to continue nonoperative treatment versus surgery.  He elected for total knee arthroplasty.     We had a long discussion about the risks, benefits, and expected recovery for total knee replacement.  We specifically talked about his valgus deformity and flexion contracture this predisposes him to a peroneal nerve palsy which may necessitate the use of an AFO.     Trip Brown for more than six months has had limited function as well as persistent and severe pain which has negatively impacted the quality of life and interfered with activities of daily living. Under my care or the care of other providers, for greater than the three months, conservative treatment including activity modification, over the counter pain medications, injections, physical therapy and/or recommended home exercise program, have provided  only minimal relief. The patient has not had an intra-articular injection in the past 3 months. The option to continue with conservative measures in lieu of arthroplasty was discussed and offered. However, given the failure of these conservative measures and the clinical and radiographic evidence of end-stage arthritis, the patient is a good candidate for an elective total knee arthroplasty. The stated potential benefits include pain relief and improved function were discussed but no guarantees were offered. Some patients may experience improved range of motion but pre-operative range of motion remains the strongest predictor of post-operative range of motion.      We discussed risks, limitations, benefits and alternatives to total knee arthroplasty today. I reviewed risks and concerns including but not limited to implant wear, loosening, implant failure, joint dislocation, infection, need for revision surgery, delayed wound healing, deep vein thrombosis, pulmonary embolism, stroke, other cardiopulmonary event, nerve or vascular injury, death and other medical and anesthetic complications of surgery. We talked about the potential for persistent pain following surgery since there are many possible causes for periarticular pain. The patient was advised that joint replacement will only relieve pain that is coming from the joint. We talked about leg length discrepancy that may necessitate the use of a shoe lift, neurovascular problems such as leg weakness or numbness after surgery. I reviewed activity restrictions in detail. We discussed the concerns about intraoperative fracture, ingrowth failure if applicable, anterior knee pain and numbness, and possible post-operative weight bearing/motionrestrictions.  We discussed the possible need for a blood transfusion. We discussed the fact that many of our patients are able to go home in 1 day or the same day depending on their health, mobility, pre-op preparation, individual  home situation and personal preference. The patient should take our pre-operative teaching class. All of the patients questions were answered. The patient can call my office to schedule surgery and the pre-op teaching class. I told the patient that they should contact their primary care physician to discuss fitness for surgery.     The patient acknowledged a clear understanding of these issues and expressed the desire to proceed with surgery once medical clearance has been obtained.     Geoffrey Urrutia MD  Department of Orthopaedic Surgery  Division of Adult Reconstruction  , [facility]

## 2025-03-10 NOTE — ANESTHESIA POSTPROCEDURE EVALUATION
Patient: Trip Brown    Procedure Summary       Date: 03/10/25 Room / Location: [facility] OR 06 / Virtual [facility] OR    Anesthesia Start: 0736 Anesthesia Stop: 1029    Procedure: RIGHT TOTAL KNEE ARTHROPLASTY (Right: Knee) Diagnosis:       Arthritis of right knee      (Arthritis of right knee [M17.11])    Surgeons: Geoffrey Urrutia MD Responsible Provider: Minor Chopra MD    Anesthesia Type: regional, spinal ASA Status: 3            Anesthesia Type: regional, spinal    Vitals Value Taken Time   /76 03/10/25 1026   Temp 36 03/10/25 1030   Pulse 83 03/10/25 1029   Resp 14 03/10/25 1029   SpO2 94 % 03/10/25 1029   Vitals shown include unfiled device data.    Anesthesia Post Evaluation    Patient location during evaluation: PACU  Patient participation: complete - patient cannot participate  Level of consciousness: sleepy but conscious  Pain score: 0  Pain management: adequate  Multimodal analgesia pain management approach  Airway patency: patent  Cardiovascular status: acceptable  Respiratory status: acceptable  Hydration status: acceptable  Postoperative Nausea and Vomiting: none        No notable events documented.

## 2025-03-10 NOTE — ANESTHESIA PROCEDURE NOTES
Peripheral Block    Patient location during procedure: pre-op  Start time: 3/10/2025 7:09 AM  End time: 3/10/2025 7:19 AM  Reason for block: at surgeon's request and post-op pain management  Staffing  Performed: attending   Authorized by: Minor Chopra MD    Performed by: Minor Chopra MD  Preanesthetic Checklist  Completed: patient identified, IV checked, site marked, risks and benefits discussed, surgical consent, monitors and equipment checked, pre-op evaluation and timeout performed   Timeout performed at: 3/10/2025 7:09 AM  Peripheral Block  Patient position: laying flat  Prep: ChloraPrep  Patient monitoring: heart rate, cardiac monitor and continuous pulse ox  Block type: IPACK  Laterality: right  Injection technique: single-shot  Guidance: ultrasound guided  Local infiltration: ropivacaine  Infiltration strength: 0.5 %  Dose: 15 mL  Needle  Needle gauge: 21 G  Needle length: 10 cm  Needle localization: ultrasound guidance  Test dose: negative  Assessment  Injection assessment: negative aspiration for heme, no paresthesia on injection, incremental injection and local visualized surrounding nerve on ultrasound  Paresthesia pain: none  Heart rate change: no  Slow fractionated injection: yes

## 2025-03-10 NOTE — ANESTHESIA PROCEDURE NOTES
Spinal Block    Patient location during procedure: OR  Start time: 3/10/2025 7:40 AM  End time: 3/10/2025 7:43 AM  Reason for block: primary anesthetic  Staffing  Performed: CRNA   Authorized by: Minor Chopra MD    Performed by: YUE Neal-CRNA    Preanesthetic Checklist  Completed: patient identified, IV checked, site marked, risks and benefits discussed, surgical consent, monitors and equipment checked, pre-op evaluation, timeout performed and sterile techniques followed  Block Timeout  RN/Licensed healthcare professional reads aloud to the Anesthesia provider and entire team: Patient identity, procedure with side and site, patient position, and as applicable the availability of implants/special equipment/special requirements.  Patient on coagulant treatment: no  Timeout performed at:   Spinal Block  Patient position: sitting  Prep: Betadine  Sterility prep: cap, drape, gloves, hand hygiene and mask  Sedation level: light sedation  Patient monitoring: blood pressure, continuous pulse oximetry, EKG, ETCO2 and heart rate  Approach: midline  Vertebral space: L4-5  Injection technique: single-shot  Needle  Needle type: Narendra   Needle gauge: 22 G  Needle length: 3.5 in  Free flowing CSF: yes    Assessment  Sensory level: T6  Block outcome: patient comfortable  Procedure assessment: patient sedated but conversant throughout procedure and patient tolerated procedure well with no immediate complications  Additional Notes  Bupivicaine 15mg

## 2025-03-10 NOTE — OP NOTE
Orthopaedic Surgery Operative Report    Name: Trip Brown, : 1960, Age: 64 y.o., male MRN: [id number]    Date of surgery: 3/10/2025     Preoperative diagnosis:   Right knee osteoarthritis   Acquired genu valgum     Postoperative diagnosis:   Right knee osteoarthritis   2.    Acquired genu valgum     Procedure: Right total knee arthroplasty     Implants:  Depuy Attune size 7 femur PS  Depuy Attune size 6 tibia  38 mm medialized patella  5 mm PS insert     Surgeons: Geoffrey Urrutia MD     Anesthesia: Spinal plus regional     Intravenous fluids: 1000 ml LR  Estimated blood loss: 50 cc  Urine output: No collecting device     Complications: None     Findings: End-stage osteoarthritis, as expected     Pathology: None     Indications:   Trip Brown is a 64-year-old male who presented with debilitating pain of the knee secondary to end-stage degenerative joint disease. Has failed nonoperative treatment and presented for total knee arthroplasty.  Risks, complications, and benefits of the procedure have been discussed and all questions have been answered preoperatively.  Risks discussed included but were not limited to bleeding, infection, injury to nerves or blood vessels, infection, fracture, loosening, pain, stiffness, instability, blood clots, medical complications, risks of anesthesia and, and death.    Trip has a history of drug abuse and smoking. He lives in a sober living house and has maintained his sobriety for several years. He has not smoked for at least a year. We made a plan pre-op to discharge to a facility for supervised pain medication admin. He will return to sober living after he is no longer taking narcotic pain medication.      Procedure in detail:  Patient was identified in preoperative holding and the operative extremity was agreed upon and marked in indelible ink. The patient was brought to the operating room and placed on the operating room table in the supine position. After  anesthesia was established, the patient was positioned supine and a tourniquet placed on the proximal thigh. Bony prominences and peripheral nerves were padded. The patient was given prophylactic antibiotics within one hour of skin incision. The involved lower extremity was prepped and draped in usual sterile fashion. Surgical timeout was taken to confirm the operative side and planned procedure.     A straight incision was used medial to the midline carried through the subcutaneous tissue to the underlying extensor mechanism. A midvastus approach was utilized. The distal femur and proximal tibia were shaped by a sequence of saw cuts measured to accept the optimal size component based on intraoperative assessment as well as the preoperative plan. Marginal osteophytes were removed and a lamina  was utilized with the knee in 90 of flexion to clear posterior osteophytes, loose bodies and meniscal remnants. The medial and lateral menisci were removed. The ACL and PCL were excised. The tibial trial baseplate was put in place and the keel punch introduced. Trial reduction was performed with the trial femur and the optimal trial insert. The knee was tested for full extension, stability to stress in extension, mid flexion, and 90 of flexion, as well as for full flexion and patellar tracking. The trial implants were removed. All bone was then prepared with lavage and drying for preparation prior to final component placement. The final implants were cemented. The knee was reduced and motion and stability was again tested and found to be appropriate. The tibial insert was exchanged to the final tibial insert which was impacted into position. Copious irrigation was performed and hemostasis was achieved. The arthrotomy was closed with #1 Vicryl and #1 Stratafix. The wound was closed in layers and dermabond was applied. A sterile dressing and a compressive wrap was applied. The patient tolerated the procedure well and was  taken to the recovery room in stable condition.     Postoperative plan:  Analgesia: Standard postop pain control, Zofran as needed  Anticoagulation: ASA 81 mg twice daily x 4 weeks  Activity: Weightbearing as tolerated operative extremity with walker at all times while ambulating, PT/OT   Antibiotics: Perioperative weigh-based Ancef  Dressing: Mepilex  Imaging: X-ray in PACU  Labs CBC/BMP POD1 AM  Disposition: Anticipate discharge POD1 pending PT/OT evalaution and medical stability     Attending attestation: I performed the procedure.     Geoffrey Urrutia MD  Department of Orthopaedic Surgery  Division of Adult Reconstruction  , [facility]

## 2025-03-10 NOTE — BRIEF OP NOTE
Date: 3/10/2025  OR Location: [facility] OR    Name: Trip Brown, : 1960, Age: 64 y.o., MRN: [id number], Sex: male    Diagnosis  Pre-op Diagnosis      * Arthritis of right knee [M17.11] Post-op Diagnosis     * Arthritis of right knee [M17.11]     Procedures  RIGHT TOTAL KNEE ARTHROPLASTY  08281 - UT ARTHRP KNE CONDYLE&PLATU MEDIAL&LAT COMPARTMENTS      Surgeons      * Geoffrey Urrutia - Primary    Resident/Fellow/Other Assistant:  Surgeons and Role:  * No surgeons found with a matching role *    Staff:   Scrub Person: Matti  Surgical Assistant: Kristina  Circulator: Libby  Surgical Assistant: Suri    Anesthesia Staff: Anesthesiologist: Minor Chopra MD  CRNA: YUE Neal-CRNA    Procedure Summary  Anesthesia: Regional, Spinal  ASA: III  Estimated Blood Loss: 50mL  Intra-op Medications:   Administrations occurring from 0730 to 1130 on 03/10/25:   Medication Name Total Dose   sodium chloride 0.9 % irrigation solution 3,000 mL   morphine 8 mg, ketorolac (Toradol) 30 mg, lidocaine-epinephrine (Xylocaine W/EPI) 1 %-1:100,000 20 mL, ropivacaine (Naropin) 5 mg/mL (0.5 %) 100 mg in sodium chloride 0.9% 49 mL injection Cannot be calculated   morphine 8 mg, ketorolac (Toradol) 30 mg, lidocaine-epinephrine (Xylocaine W/EPI) 1 %-1:100,000 20 mL, ropivacaine (Naropin) 5 mg/mL (0.5 %) 100 mg in sodium chloride 0.9% 49 mL injection Cannot be calculated   fentaNYL PF (Sublimaze) injection 50 mcg/mL  - Omnicell Override Pull Cannot be calculated   midazolam (Versed) injection 1 mg/mL  - Omnicell Override Pull Cannot be calculated   ceFAZolin (Ancef) vial 1 g 2 g   dexAMETHasone (Decadron) injection 4 mg/mL 4 mg   LR bolus Cannot be calculated   midazolam (Versed) 1 mg/1 mL 2 mg   ondansetron (Zofran) 2 mg/mL injection 4 mg   propofol (Diprivan) injection 10 mg/mL 1,000.36 mg   tranexamic acid IV 1,000 mg in 100 mL NaCl (iso) - premix 2,000 mg              Anesthesia Record               Intraprocedure I/O Totals           Intake    LR bolus 1000.00 mL    Tranexamic Acid 200.00 mL    The total shown is the total volume documented since Anesthesia Start was filed.    Total Intake 1200 mL       Output    Est. Blood Loss 50 mL    Total Output 50 mL       Net    Net Volume 1150 mL          Specimen:   ID Type Source Tests Collected by Time   1 : RIGHT KNEE BONE AND TISSUE Tissue KNEE ARTHROPLASTY RIGHT SURGICAL PATHOLOGY EXAM Geoffrey Urrutia MD 3/10/2025 0819                  Findings: End-stage arthritis     Complications:  None; patient tolerated the procedure well.     Disposition: PACU - hemodynamically stable.  Condition: stable  Specimens Collected:   ID Type Source Tests Collected by Time   1 : RIGHT KNEE BONE AND TISSUE Tissue KNEE ARTHROPLASTY RIGHT SURGICAL PATHOLOGY EXAM Geoffrey Urrutia MD 3/10/2025 0819     Attending Attestation: I performed the procedure.    Geoffrey Urrutia  Phone Number: [phone]

## 2025-03-10 NOTE — PROGRESS NOTES
Occupational Therapy    Evaluation    Patient Name: Trip Brown  MRN: [id number]  Today's Date: 3/10/2025  Time Calculation  Start Time: 1418  Stop Time: 1440  Time Calculation (min): 22 min  211/211-A    Assessment  IP OT Assessment  OT Assessment: Patient would benefit from further OT for ADL's and functional transfers/mobility while recovering from RIGHT TKR done 3/10/2025 due to osteoarthritis and acquired genu valgum  End of Session Communication: Bedside nurse, PCT/NA/CTA  End of Session Patient Position: Bed, 2 rail up, Alarm off, not on at start of session (attempted to activate alarm however not working properly-informed RN); call-light within reach    Plan:  Treatment Interventions: ADL retraining, Functional transfer training, Patient/family training, Equipment evaluation/education, Endurance training, Compensatory technique education (TKR precautions training)  OT Frequency: BID (as needed)  OT Discharge Recommendations: Low intensity level of continued care  OT - OK to Discharge: Yes to next level of care when medically cleared by physician/medical team    Subjective   Current Problem:  1. Arthritis of right knee  Surgical Pathology Exam    Surgical Pathology Exam        General:  General  Reason for Referral: recent surgery  Referred By: YUE Daly-CNS  Co-Treatment: PT co-eval  Reason: to maximize safety during mobility tasks  Prior to Session Communication: Bedside nurse who confirmed that patient is medically stable to participate in this OT session  Patient Position Received: Bed, 2 rail up, Alarm off, not on at start of session  General Comment: Patient seen in room; cooperative, motivated    Precautions:  LE Weight Bearing Status: Weight Bearing as Tolerated  Medical Precautions: Fall precautions, Oxygen therapy device and L/min (1L nasal cannula)  Post-Surgical Precautions: Right total knee precautions  Precautions Comment: UE IV    Pain:  Pain Assessment  Pain Assessment:  0-10  0-10 (Numeric) Pain Score: 10 - Worst possible pain  Pain Type: Surgical pain  Pain Location: Knee  Pain Interventions: Repositioned, Cold applied (informed PCA who informed RN)    Objective   Cognition:  Overall Cognitive Status: Within Functional Limits (garbled speech)     Home Living:  Type of Home: House  Lives With: Alone  Home Adaptive Equipment: Walker rolling or standard, Cane  Home Layout: Multi-level (resides primarily in basement with railing for stairs)  Home Access: Stairs to enter with rails (1+3 entry)  Bathroom Shower/Tub: Walk-in shower  Bathroom Toilet: Standard  Bathroom Equipment:  (borrowing raised toilet seat from sister however has not yet seen so unknown of type of seat)     Prior Function:  Level of [place]: Independent with homemaking with ambulation, Independent with ADLs and functional transfers (Weather permitting patient was able to use motorcycle to do groceries.  Upon return home, sister will assist with driviving, homemaking and as needed.)  Ambulatory Assistance: Independent  Hand Dominance: Right  Prior Function Comments: sleeps in regular bed    ADL:  LE Dressing Assistance: Maximal (estimated due to generalized weakness post-op and pain)  LE Dressing Deficit: Don/doff R sock, Don/doff L sock  ADL Comments: to further address lower body ADL's using assistive techniques/adaptive equipment as needed while recovering from TKR    Bed Mobility/Transfers:   Bed Mobility  Bed Mobility: Yes  Bed Mobility 1  Bed Mobility 1: Supine to sitting  Level of Assistance 1: Minimal verbal cues (SBA)  Bed Mobility Comments 1: HOB elevated  Bed Mobility 2  Bed Mobility  2: Sitting to supine  Level of Assistance 2: Minimum assistance, Minimal verbal cues  Transfers  Transfer: Yes  Transfer 1  Transfer From 1: Sit to, Stand to  Transfer to 1: Bed  Transfer Device 1: Walker  Transfer Level of Assistance 1: Minimum assistance, Moderate verbal cues    Ambulation/Gait Training:  Functional  Mobility  Functional Mobility Performed: Yes  Functional Mobility 1  Device 1: Rolling walker  Assistance 1: Minimum assistance, Moderate verbal cues  Comments 1: performed few sidesteps along hospital bed    Sitting Balance:  Static Sitting Balance  Static Sitting-Level of Assistance: Distant supervision    Standing Balance:  Static Standing Balance  Static Standing-Level of Assistance: Minimum assistance    Extremities: RUE   RUE : Within Functional Limits (grossly observed during functional tasks) and LUE   LUE: Within Functional Limits (grossly observed during functional tasks)    Outcome Measures: [facility] Daily Activity  Putting on and taking off regular lower body clothing: A lot  Bathing (including washing, rinsing, drying): A lot  Putting on and taking off regular upper body clothing: None  Toileting, which includes using toilet, bedpan or urinal: A lot  Taking care of personal grooming such as brushing teeth: None  Eating Meals: None  Daily Activity - Total Score: 18     EDUCATION:  Education Documentation  Precautions, taught by Rebecca Gallego OT at 3/10/2025  3:09 PM.  Learner: Patient  Readiness: Acceptance  Method: Explanation  Response: Demonstrated Understanding, Needs Reinforcement    Mobility Training, taught by Rebecca Gallego OT at 3/10/2025  3:09 PM.  Learner: Patient  Readiness: Acceptance  Method: Explanation  Response: Demonstrated Understanding, Needs Reinforcement    Goals:   Encounter Problems       Encounter Problems (Active)       OT Goals       Patient will complete lower body bathing/dressing; toileting with minimal assist using assistive techniques/adaptive equipment as needed  (Progressing)       Start:  03/10/25    Expected End:  03/11/25            Patient will perform functional transfers with supervision:  bed, chair, commode using DME as needed and simulated car transfer with minimal assist  (Progressing)       Start:  03/10/25    Expected End:  03/11/25            Patient  will adhere to TKR precautions to ADL's and functional transfers/mobility  (Progressing)       Start:  03/10/25    Expected End:  03/11/25            Patient will tolerate standing for 5 mins. and show overall good (-) standing balance during ADL's and functional transfers/mobility  (Progressing)       Start:  03/10/25    Expected End:  03/11/25

## 2025-03-10 NOTE — ANESTHESIA PREPROCEDURE EVALUATION
Patient: Trip Brown    Procedure Information       Date/Time: 03/10/25 0730    Procedure: RIGHT TOTAL KNEE ARTHROPLASTY (Right: Knee)    Location: [facility] 06 / [facility]    Surgeons: Geoffrey Urrutia MD            Relevant Problems   Anesthesia (within normal limits)      Cardiac   (+) Angina pectoris   (+) SVT (supraventricular tachycardia) (CMS-HCC)      Pulmonary   (+) Asthma   (+) Asthma with COPD with exacerbation   (+) Chronic obstructive pulmonary disease (Multi)   (+) PARRA (dyspnea on exertion)   (+) Mixed simple and mucopurulent chronic bronchitis (Multi)      Neuro   (+) Anxiety   (+) Bipolar 2 disorder, major depressive episode (Multi)   (+) Bipolar I disorder (Multi)   (+) Bipolar affective disorder, currently depressed, moderate (Multi)   (+) Chronic lumbar radiculopathy   (+) Lumbar radiculopathy   (+) Mild depressive disorder   (+) Opioid abuse   (+) Sciatic pain   (+) Severe episode of recurrent major depressive disorder, without psychotic features (Multi)      Liver   (+) Chronic hepatitis C without hepatic coma (Multi)      Hematology   (+) Thrombocytopenia (CMS-HCC)      Musculoskeletal   (+) Acquired scoliosis   (+) Localized, primary osteoarthritis   (+) Post-traumatic osteoarthritis of left knee   (+) Primary osteoarthritis of both knees      HEENT   (+) Chronic maxillary sinusitis   (+) Sinusitis      ID   (+) Chronic hepatitis C without hepatic coma (Multi)   (+) Mixed simple and mucopurulent chronic bronchitis (Multi)      Skin   (+) Rash and nonspecific skin eruption       Clinical information reviewed:   Tobacco  Allergies  Meds   Med Hx  Surg Hx   Fam Hx          NPO Detail:  NPO/Void Status  Date of Last Liquid: 03/10/25  Time of Last Liquid: 0530  Date of Last Solid: 03/09/25  Time of Last Solid: 2100         Physical Exam    Airway  Mallampati: II  TM distance: >3 FB  Neck ROM: full     Cardiovascular - normal exam     Dental   (+) upper dentures, lower dentures      Pulmonary - normal exam  Breath sounds clear to auscultation     Abdominal            Anesthesia Plan    History of general anesthesia?: yes  History of complications of general anesthesia?: no    ASA 3     regional and spinal     The patient is not a current smoker.    intravenous induction   Postoperative administration of opioids is intended.  Trial extubation is planned.  Anesthetic plan and risks discussed with patient.    Plan discussed with CRNA.

## 2025-03-10 NOTE — CARE PLAN
The patient's goals for the shift include      The clinical goals for the shift include  Problem: Pain - Adult  Goal: Verbalizes/displays adequate comfort level or baseline comfort level  Outcome: Progressing     Problem: Safety - Adult  Goal: Free from fall injury  Outcome: Progressing     Problem: Discharge Planning  Goal: Discharge to home or other facility with appropriate resources  Outcome: Progressing     Problem: Chronic Conditions and Co-morbidities  Goal: Patient's chronic conditions and co-morbidity symptoms are monitored and maintained or improved  Outcome: Progressing     Problem: Nutrition  Goal: Nutrient intake appropriate for maintaining nutritional needs  Outcome: Progressing     Problem: Fall/Injury  Goal: Not fall by end of shift  Outcome: Progressing  Goal: Be free from injury by end of the shift  Outcome: Progressing  Goal: Use assistive devices by end of the shift  Outcome: Progressing     Problem: Skin  Goal: Decreased wound size/increased tissue granulation at next dressing change  Outcome: Progressing  Goal: Promote skin healing  Outcome: Progressing

## 2025-03-10 NOTE — PROGRESS NOTES
Physical Therapy    Physical Therapy Evaluation    Patient Name: Trip Brown  MRN: [id number]  Today's Date: 3/10/2025   Time Calculation  Start Time: 1419  Stop Time: 1435  Time Calculation (min): 16 min  211/211-A    Assessment/Plan   PT Assessment  PT Assessment Results: Decreased strength, Decreased range of motion, Decreased endurance, Impaired balance, Decreased mobility, Pain  Rehab Prognosis: Good  Barriers to Discharge Home: No anticipated barriers  Evaluation/Treatment Tolerance: Patient limited by fatigue, Patient limited by pain  Strengths: Ability to acquire knowledge, Premorbid level of function  End of Session Communication: Bedside nurse  Assessment Comment: Pt is s/p R TKA on 3/10 by Dr. Urrutia and is now WBAT. Pt demos decreased strength, endurance and safety. Pt groggy post op and requires cues for safety. Anticipate improvement next session with cognition. Recommend low intensity therapy.  End of Session Patient Position: Bed, 2 rail up (attempted to activate bed alarm and not working properly. RN informed.)  IP OR SWING BED PT PLAN  Inpatient or Swing Bed: Inpatient  PT Plan  Treatment/Interventions: Bed mobility, Transfer training, Gait training, Stair training, Balance training, Strengthening, Endurance training, Range of motion, Therapeutic exercise, Therapeutic activity, Home exercise program, Positioning  PT Plan: Ongoing PT  PT Frequency: BID  PT Discharge Recommendations: Low intensity level of continued care  PT - OK to Discharge: Yes    Subjective     Current Problem:  1. Arthritis of right knee  Surgical Pathology Exam    Surgical Pathology Exam        Patient Active Problem List   Diagnosis    Acquired scoliosis    Acquired spondylolisthesis    Anxiety    Asthma    Asthma with COPD with exacerbation    Bipolar 2 disorder, major depressive episode (Multi)    Bipolar affective disorder, currently depressed, moderate (Multi)    Bipolar I disorder (Multi)    Cellulitis    Cervical  disc disease    Angina pectoris    Chronic lumbar radiculopathy    Lumbar radiculopathy    Chronic maxillary sinusitis    Chronic obstructive pulmonary disease (Multi)    Cocaine abuse in remission (Multi)    Cocaine use    Cocaine use disorder, severe, dependence (Multi)    COPD with chronic bronchitis (Multi)    Dental caries    Dependence on nicotine from cigarettes    Epidural abscess (HHS-HCC)    Epistaxis    Foot pain, bilateral    Hepatitis B carrier (Multi)    Chronic hepatitis C without hepatic coma (Multi)    History of substance dependence (Multi)    IV drug abuse (Multi)    Localized, primary osteoarthritis    Severe back pain    Mild depressive disorder    Mixed simple and mucopurulent chronic bronchitis (Multi)    Neck pain, acute    Opioid abuse    Opioid use disorder, severe, dependence (Multi)    Opioid withdrawal (Multi)    Other abnormalities of gait and mobility    Abscess    Inflamed seborrheic keratosis    Hemangioma of skin and subcutaneous tissue    Paraspinal abscess (Multi)    Periapical abscess without sinus    Polysubstance abuse (Multi)    Post-traumatic osteoarthritis of left knee    Primary osteoarthritis of both knees    Rash and nonspecific skin eruption    Renal insufficiency    Restless legs syndrome    Rupture of left proximal biceps tendon    Sciatic pain    Severe episode of recurrent major depressive disorder, without psychotic features (Multi)    Severe protein-calorie malnutrition (Multi)    Sinusitis    Smoker    Substance induced mood disorder (Multi)    SVT (supraventricular tachycardia) (CMS-HCC)    Tendinitis of left shoulder    Thrombocytopenia (CMS-HCC)    Palpitations    Bradycardia    PARRA (dyspnea on exertion)    NSVT (nonsustained ventricular tachycardia) (Multi)    Spondylolisthesis    S/P lumbar spinal fusion    Arthritis of right knee    S/P TKR (total knee replacement) not using cement, right     General Visit Information:  General  Reason for Referral: PT brandon  and treat; impaired mobility  Referred By: YUE Daly-CNS  Co-Treatment: OT  Co-Treatment Reason: to maximize safety during mobility tasks  Prior to Session Communication: Bedside nurse  Patient Position Received: Bed, 2 rail up  General Comment: Patient seen in room; cooperative, motivated    Home Living:  Home Living  Home Living Comments: Pt lives alone in a house with 1+3 DYLAN with HR and has 2 level home with bed/bath in basement with HR. Pt has standard toilet with a riser and owns cane and FWW. Does not drive. Sleeps in regular bed.    Prior Level of Function:  Prior Function Per Pt/Caregiver Report  Level of [place]: Independent with ADLs and functional transfers, Independent with homemaking with ambulation  Prior Function Comments: sister drives and completes grocery shopping    Precautions:  Precautions  LE Weight Bearing Status: Weight Bearing as Tolerated  Post-Surgical Precautions: Right total knee precautions     Objective     Pain:  Pain Assessment  Pain Assessment: 0-10  0-10 (Numeric) Pain Score: 10 - Worst possible pain  Pain Interventions: Cold applied, Repositioned (RN notified)  Response to Interventions: Resting quietly    Cognition:  Cognition  Overall Cognitive Status: Within Functional Limits (groggy post op with mumbled speech)  Orientation Level: Oriented X4    General Assessments:  General Observation  General Observation: R knee sx dressing clean, dry and intact   Activity Tolerance  Endurance: Endurance does not limit participation in activity  Sensation  Light Touch: No apparent deficits  Strength  Strength Comments: demos WFL BLE     Functional Assessments:     Bed Mobility  Bed Mobility:  (completed supine to sit with SBA and increased time and returned to supine with Reuben x1 for RLE management. Denies dizziness. Overall groggy throughout.)  Transfers  Transfer:  (completed from EOB to FWW with Reuben x1 and cues for correct hand placement. No buckling noted. Pt holding  RLE slightly flexed with decreased WB due to pain)  Ambulation/Gait Training  Ambulation/Gait Training Performed:  (completed a few lateral steps along EOB with use of FWW and Reuben x1 for safety. Denies dizziness. Short step length with use of UE support.)        Extremity/Trunk Assessments:  RLE   RLE : Exceptions to WFL  LLE   LLE : Within Functional Limits    Outcome Measures:     [facility] Basic Mobility  Turning from your back to your side while in a flat bed without using bedrails: A little  Moving from lying on your back to sitting on the side of a flat bed without using bedrails: A little  Moving to and from bed to chair (including a wheelchair): A little  Standing up from a chair using your arms (e.g. wheelchair or bedside chair): A little  To walk in hospital room: A little  Climbing 3-5 steps with railing: A lot  Basic Mobility - Total Score: 17     Goals:  Encounter Problems       Encounter Problems (Active)       PT Problem       PT Goal 1 STG - Pt will transition supine <> sitting with MOD I  (Progressing)       Start:  03/10/25    Expected End:  03/12/25            PT Goal 2 STG - Pt will perform a B LE ther ex program of 2-3 sets of 10  (Progressing)       Start:  03/10/25    Expected End:  03/12/25            PT Goal 3 STG - Pt will transfer STS with MOD I  (Progressing)       Start:  03/10/25    Expected End:  03/12/25            PT Goal 4 STG - Pt will amb 75' using FWW with MOD I  (Progressing)       Start:  03/10/25    Expected End:  03/12/25            PT Goal 5 STG -  Pt will navigate 10 stairs using HR with supervision  (Progressing)       Start:  03/10/25    Expected End:  03/12/25               Pain - Adult            Education Documentation  Handouts, taught by Sneha Du PT at 3/10/2025  3:22 PM.  Learner: Patient  Readiness: Acceptance  Method: Explanation  Response: Verbalizes Understanding, Needs Reinforcement    Body Mechanics, taught by Sneha Du PT at 3/10/2025  3:22  PM.  Learner: Patient  Readiness: Acceptance  Method: Explanation  Response: Verbalizes Understanding, Needs Reinforcement    Mobility Training, taught by Sneha Du PT at 3/10/2025  3:22 PM.  Learner: Patient  Readiness: Acceptance  Method: Explanation  Response: Verbalizes Understanding, Needs Reinforcement    Education Comments  No comments found.

## 2025-03-11 LAB
ANION GAP SERPL CALC-SCNC: 12 MMOL/L (ref 10–20)
ANION GAP SERPL CALC-SCNC: 12 MMOL/L (ref 10–20)
BUN SERPL-MCNC: 25 MG/DL (ref 6–23)
BUN SERPL-MCNC: 26 MG/DL (ref 6–23)
CALCIUM SERPL-MCNC: 8.9 MG/DL (ref 8.6–10.3)
CALCIUM SERPL-MCNC: 9.1 MG/DL (ref 8.6–10.3)
CHLORIDE SERPL-SCNC: 106 MMOL/L (ref 98–107)
CHLORIDE SERPL-SCNC: 107 MMOL/L (ref 98–107)
CO2 SERPL-SCNC: 21 MMOL/L (ref 21–32)
CO2 SERPL-SCNC: 23 MMOL/L (ref 21–32)
CREAT SERPL-MCNC: 1.62 MG/DL (ref 0.5–1.3)
CREAT SERPL-MCNC: 1.77 MG/DL (ref 0.5–1.3)
EGFRCR SERPLBLD CKD-EPI 2021: 42 ML/MIN/1.73M*2
EGFRCR SERPLBLD CKD-EPI 2021: 47 ML/MIN/1.73M*2
ERYTHROCYTE [DISTWIDTH] IN BLOOD BY AUTOMATED COUNT: 12.6 % (ref 11.5–14.5)
GLUCOSE SERPL-MCNC: 132 MG/DL (ref 74–99)
GLUCOSE SERPL-MCNC: 134 MG/DL (ref 74–99)
HCT VFR BLD AUTO: 42.9 % (ref 41–52)
HGB BLD-MCNC: 14 G/DL (ref 13.5–17.5)
MCH RBC QN AUTO: 33.2 PG (ref 26–34)
MCHC RBC AUTO-ENTMCNC: 32.6 G/DL (ref 32–36)
MCV RBC AUTO: 102 FL (ref 80–100)
NRBC BLD-RTO: 0 /100 WBCS (ref 0–0)
PLATELET # BLD AUTO: 155 X10*3/UL (ref 150–450)
POTASSIUM SERPL-SCNC: 4.2 MMOL/L (ref 3.5–5.3)
POTASSIUM SERPL-SCNC: 4.3 MMOL/L (ref 3.5–5.3)
RBC # BLD AUTO: 4.22 X10*6/UL (ref 4.5–5.9)
SODIUM SERPL-SCNC: 136 MMOL/L (ref 136–145)
SODIUM SERPL-SCNC: 137 MMOL/L (ref 136–145)
WBC # BLD AUTO: 12.6 X10*3/UL (ref 4.4–11.3)

## 2025-03-11 PROCEDURE — 97535 SELF CARE MNGMENT TRAINING: CPT | Mod: CQ,GP

## 2025-03-11 PROCEDURE — 85027 COMPLETE CBC AUTOMATED: CPT | Performed by: STUDENT IN AN ORGANIZED HEALTH CARE EDUCATION/TRAINING PROGRAM

## 2025-03-11 PROCEDURE — 83735 ASSAY OF MAGNESIUM: CPT

## 2025-03-11 PROCEDURE — 80048 BASIC METABOLIC PNL TOTAL CA: CPT | Performed by: STUDENT IN AN ORGANIZED HEALTH CARE EDUCATION/TRAINING PROGRAM

## 2025-03-11 PROCEDURE — 94640 AIRWAY INHALATION TREATMENT: CPT

## 2025-03-11 PROCEDURE — 36415 COLL VENOUS BLD VENIPUNCTURE: CPT | Performed by: STUDENT IN AN ORGANIZED HEALTH CARE EDUCATION/TRAINING PROGRAM

## 2025-03-11 PROCEDURE — 2500000001 HC RX 250 WO HCPCS SELF ADMINISTERED DRUGS (ALT 637 FOR MEDICARE OP): Performed by: STUDENT IN AN ORGANIZED HEALTH CARE EDUCATION/TRAINING PROGRAM

## 2025-03-11 PROCEDURE — 97116 GAIT TRAINING THERAPY: CPT | Mod: CQ,GP

## 2025-03-11 PROCEDURE — 2500000002 HC RX 250 W HCPCS SELF ADMINISTERED DRUGS (ALT 637 FOR MEDICARE OP, ALT 636 FOR OP/ED): Performed by: STUDENT IN AN ORGANIZED HEALTH CARE EDUCATION/TRAINING PROGRAM

## 2025-03-11 PROCEDURE — 2500000001 HC RX 250 WO HCPCS SELF ADMINISTERED DRUGS (ALT 637 FOR MEDICARE OP): Performed by: CLINICAL NURSE SPECIALIST

## 2025-03-11 PROCEDURE — 97110 THERAPEUTIC EXERCISES: CPT | Mod: CQ,GP

## 2025-03-11 PROCEDURE — 7100000011 HC EXTENDED STAY RECOVERY HOURLY - NURSING UNIT

## 2025-03-11 PROCEDURE — 2500000005 HC RX 250 GENERAL PHARMACY W/O HCPCS: Performed by: STUDENT IN AN ORGANIZED HEALTH CARE EDUCATION/TRAINING PROGRAM

## 2025-03-11 PROCEDURE — 97535 SELF CARE MNGMENT TRAINING: CPT | Mod: CO,GO

## 2025-03-11 PROCEDURE — 99024 POSTOP FOLLOW-UP VISIT: CPT | Performed by: STUDENT IN AN ORGANIZED HEALTH CARE EDUCATION/TRAINING PROGRAM

## 2025-03-11 PROCEDURE — 2500000004 HC RX 250 GENERAL PHARMACY W/ HCPCS (ALT 636 FOR OP/ED): Performed by: STUDENT IN AN ORGANIZED HEALTH CARE EDUCATION/TRAINING PROGRAM

## 2025-03-11 RX ORDER — PANTOPRAZOLE SODIUM 40 MG/1
40 TABLET, DELAYED RELEASE ORAL
Start: 2025-03-12 | End: 2025-04-01

## 2025-03-11 RX ORDER — DOCUSATE SODIUM 100 MG/1
100 CAPSULE, LIQUID FILLED ORAL 2 TIMES DAILY
Start: 2025-03-11

## 2025-03-11 RX ORDER — POLYETHYLENE GLYCOL 3350 17 G/17G
17 POWDER, FOR SOLUTION ORAL 2 TIMES DAILY PRN
Start: 2025-03-11

## 2025-03-11 RX ORDER — ASPIRIN 81 MG/1
81 TABLET ORAL 2 TIMES DAILY
Start: 2025-03-11 | End: 2025-04-10

## 2025-03-11 RX ORDER — ACETAMINOPHEN 325 MG/1
975 TABLET ORAL EVERY 6 HOURS PRN
Start: 2025-03-11

## 2025-03-11 RX ORDER — OXYCODONE HYDROCHLORIDE 5 MG/1
5-10 TABLET ORAL EVERY 6 HOURS PRN
Qty: 15 TABLET | Refills: 0 | Status: SHIPPED | OUTPATIENT
Start: 2025-03-11 | End: 2025-03-14

## 2025-03-11 RX ADMIN — FORMOTEROL FUMARATE DIHYDRATE 20 MCG: 20 SOLUTION RESPIRATORY (INHALATION) at 06:16

## 2025-03-11 RX ADMIN — FLUOXETINE HYDROCHLORIDE 60 MG: 20 CAPSULE ORAL at 08:08

## 2025-03-11 RX ADMIN — TRAZODONE HYDROCHLORIDE 100 MG: 50 TABLET ORAL at 21:57

## 2025-03-11 RX ADMIN — LITHIUM CARBONATE 300 MG: 300 CAPSULE, GELATIN COATED ORAL at 21:57

## 2025-03-11 RX ADMIN — DOCUSATE SODIUM 100 MG: 100 CAPSULE, LIQUID FILLED ORAL at 21:57

## 2025-03-11 RX ADMIN — CEFAZOLIN SODIUM 2 G: 2 INJECTION, SOLUTION INTRAVENOUS at 08:08

## 2025-03-11 RX ADMIN — TIOTROPIUM BROMIDE INHALATION SPRAY 2 PUFF: 3.12 SPRAY, METERED RESPIRATORY (INHALATION) at 06:18

## 2025-03-11 RX ADMIN — ASPIRIN 81 MG: 81 TABLET, COATED ORAL at 21:56

## 2025-03-11 RX ADMIN — OLANZAPINE 7.5 MG: 5 TABLET, FILM COATED ORAL at 21:57

## 2025-03-11 RX ADMIN — CYCLOBENZAPRINE 10 MG: 10 TABLET, FILM COATED ORAL at 19:36

## 2025-03-11 RX ADMIN — ACETAMINOPHEN 975 MG: 325 TABLET, FILM COATED ORAL at 17:54

## 2025-03-11 RX ADMIN — DOCUSATE SODIUM 100 MG: 100 CAPSULE, LIQUID FILLED ORAL at 08:08

## 2025-03-11 RX ADMIN — IPRATROPIUM BROMIDE AND ALBUTEROL SULFATE 3 ML: .5; 3 SOLUTION RESPIRATORY (INHALATION) at 10:43

## 2025-03-11 RX ADMIN — OXYCODONE HYDROCHLORIDE 10 MG: 5 TABLET ORAL at 04:41

## 2025-03-11 RX ADMIN — ALBUTEROL SULFATE 2.5 MG: 2.5 SOLUTION RESPIRATORY (INHALATION) at 06:13

## 2025-03-11 RX ADMIN — ASPIRIN 81 MG: 81 TABLET, COATED ORAL at 08:09

## 2025-03-11 RX ADMIN — OXYCODONE HYDROCHLORIDE 10 MG: 5 TABLET ORAL at 17:53

## 2025-03-11 RX ADMIN — OXYCODONE HYDROCHLORIDE 10 MG: 5 TABLET ORAL at 23:45

## 2025-03-11 RX ADMIN — Medication 21 PERCENT: at 06:18

## 2025-03-11 RX ADMIN — IPRATROPIUM BROMIDE AND ALBUTEROL SULFATE 3 ML: .5; 3 SOLUTION RESPIRATORY (INHALATION) at 20:55

## 2025-03-11 RX ADMIN — ALBUTEROL SULFATE 2.5 MG: 2.5 SOLUTION RESPIRATORY (INHALATION) at 13:39

## 2025-03-11 RX ADMIN — OXYCODONE HYDROCHLORIDE 10 MG: 5 TABLET ORAL at 10:40

## 2025-03-11 RX ADMIN — LITHIUM CARBONATE 300 MG: 300 CAPSULE, GELATIN COATED ORAL at 08:08

## 2025-03-11 RX ADMIN — SODIUM CHLORIDE 1000 ML: 9 INJECTION, SOLUTION INTRAVENOUS at 10:33

## 2025-03-11 RX ADMIN — FORMOTEROL FUMARATE DIHYDRATE 20 MCG: 20 SOLUTION RESPIRATORY (INHALATION) at 18:50

## 2025-03-11 RX ADMIN — ALBUTEROL SULFATE 2.5 MG: 2.5 SOLUTION RESPIRATORY (INHALATION) at 18:47

## 2025-03-11 RX ADMIN — ACETAMINOPHEN 975 MG: 325 TABLET, FILM COATED ORAL at 11:30

## 2025-03-11 RX ADMIN — CYCLOBENZAPRINE 10 MG: 10 TABLET, FILM COATED ORAL at 11:30

## 2025-03-11 RX ADMIN — ALBUTEROL SULFATE 2.5 MG: 2.5 SOLUTION RESPIRATORY (INHALATION) at 01:17

## 2025-03-11 RX ADMIN — KETOROLAC TROMETHAMINE 15 MG: 30 INJECTION, SOLUTION INTRAMUSCULAR at 06:10

## 2025-03-11 RX ADMIN — ACETAMINOPHEN 975 MG: 325 TABLET, FILM COATED ORAL at 23:45

## 2025-03-11 RX ADMIN — PANTOPRAZOLE SODIUM 40 MG: 40 TABLET, DELAYED RELEASE ORAL at 06:10

## 2025-03-11 RX ADMIN — ACETAMINOPHEN 975 MG: 325 TABLET, FILM COATED ORAL at 06:10

## 2025-03-11 RX ADMIN — POLYETHYLENE GLYCOL 3350 17 G: 17 POWDER, FOR SOLUTION ORAL at 08:08

## 2025-03-11 ASSESSMENT — COGNITIVE AND FUNCTIONAL STATUS - GENERAL
TURNING FROM BACK TO SIDE WHILE IN FLAT BAD: A LITTLE
MOBILITY SCORE: 16
MOVING FROM LYING ON BACK TO SITTING ON SIDE OF FLAT BED WITH BEDRAILS: A LITTLE
STANDING UP FROM CHAIR USING ARMS: A LITTLE
TURNING FROM BACK TO SIDE WHILE IN FLAT BAD: A LITTLE
CLIMB 3 TO 5 STEPS WITH RAILING: TOTAL
MOVING FROM LYING ON BACK TO SITTING ON SIDE OF FLAT BED WITH BEDRAILS: A LITTLE
MOVING TO AND FROM BED TO CHAIR: A LITTLE
STANDING UP FROM CHAIR USING ARMS: A LITTLE
TOILETING: A LITTLE
MOBILITY SCORE: 16
WALKING IN HOSPITAL ROOM: A LITTLE
HELP NEEDED FOR BATHING: A LITTLE
MOVING TO AND FROM BED TO CHAIR: A LITTLE
CLIMB 3 TO 5 STEPS WITH RAILING: TOTAL
WALKING IN HOSPITAL ROOM: A LITTLE
DAILY ACTIVITIY SCORE: 21
DRESSING REGULAR LOWER BODY CLOTHING: A LITTLE

## 2025-03-11 ASSESSMENT — PAIN - FUNCTIONAL ASSESSMENT
PAIN_FUNCTIONAL_ASSESSMENT: 0-10

## 2025-03-11 ASSESSMENT — PAIN DESCRIPTION - DESCRIPTORS
DESCRIPTORS: ACHING

## 2025-03-11 ASSESSMENT — PAIN SCALES - GENERAL
PAINLEVEL_OUTOF10: 5 - MODERATE PAIN
PAINLEVEL_OUTOF10: 0 - NO PAIN
PAINLEVEL_OUTOF10: 4
PAINLEVEL_OUTOF10: 10 - WORST POSSIBLE PAIN
PAINLEVEL_OUTOF10: 0 - NO PAIN
PAINLEVEL_OUTOF10: 10 - WORST POSSIBLE PAIN
PAINLEVEL_OUTOF10: 8
PAINLEVEL_OUTOF10: 7

## 2025-03-11 ASSESSMENT — PAIN SCALES - PAIN ASSESSMENT IN ADVANCED DEMENTIA (PAINAD): TOTALSCORE: MEDICATION (SEE MAR)

## 2025-03-11 ASSESSMENT — ACTIVITIES OF DAILY LIVING (ADL): HOME_MANAGEMENT_TIME_ENTRY: 40

## 2025-03-11 NOTE — PROGRESS NOTES
Orthopaedic Surgery Progress Note     DOS: 3/10/2025  Right total knee arthroplasty     Subjective: Patient resting comfortably in a chair. Ambulated last night. Pain is well controlled on oral regimen. Denies numbness/paresthesia. No complaints.      Objective:   Awake and alert  Normal work of breathing     RLE:  Dressing dry  Intact ehl/fhl/ta/gsc  SILT s/s/sp/dp/t  Palpable DP pulse     A/P: 64-year-old male s/p right total knee arthroplasty, progressing well. Mild acute on chronic kidney injury. Patient is unable to discharge to sober living with pain medication. Will need placement if requires discharge with narcotics.   - WBAT RLE, walker at all times  - Mild acute on chronic YASMINE, 1L bolus this AM, trend bmp, hold nephrotoxic medication  - DVT ppx: ASA 81 mg BID 4 weeks  - Discharge pending PT/OT, medical stability     Geoffrey Urrutia MD  Department of Orthopaedic Surgery  Division of Adult Reconstruction  , [facility]

## 2025-03-11 NOTE — PROGRESS NOTES
Physical Therapy    Physical Therapy Treatment    Patient Name: Trip Brown  MRN: [id number]  Department: [facility]  Room: [address] [place] [address]  Today's Date: 3/11/2025  Time Calculation  Start Time: 1331  Stop Time: 1416  Time Calculation (min): 45 min         Assessment/Plan         PT Plan  Treatment/Interventions: Bed mobility, Transfer training, Gait training, Stair training, Balance training, Strengthening, Endurance training, Range of motion, Therapeutic exercise, Therapeutic activity, Home exercise program, Positioning  PT Plan: Ongoing PT  PT Frequency: BID  PT Discharge Recommendations: Low intensity level of continued care  PT - OK to Discharge: Yes      General Visit Information:   PT  Visit  PT Received On: 03/11/25       Subjective                    Objective   Pain: r knee 6/10                              Treatments:  Therapeutic Exercise  Therapeutic Exercise Performed:  (supine rle ap's, qs,hs,saqs,laqs, sitting heelslides x 20 reps ea  r knee arom 0-80 degrees sitting cold pack to r  knee post rx)    Bed Mobility  Bed Mobility:  (supine to sit and sit to supine sba)    Ambulation/Gait Training  Ambulation/Gait Training Performed:  (ambulated 45 feet x 2 using fixed wheeled walker cga)  Transfers  Transfer:  (sit to stand cga)         Outcome Measures:  [facility] Basic Mobility  Turning from your back to your side while in a flat bed without using bedrails: A little  Moving from lying on your back to sitting on the side of a flat bed without using bedrails: A little  Moving to and from bed to chair (including a wheelchair): A little  Standing up from a chair using your arms (e.g. wheelchair or bedside chair): A little  To walk in hospital room: A little  Climbing 3-5 steps with railing: Total  Basic Mobility - Total Score: 16    Education Documentation  Home Exercise Program, taught by Jeniffer Boggs PTA at 3/11/2025  3:52 PM.  Learner: Patient  Readiness: Acceptance  Method: Demonstration  Response:  Demonstrated Understanding    Handouts, taught by Jeniffer Boggs PTA at 3/11/2025  3:52 PM.  Learner: Patient  Readiness: Acceptance  Method: Demonstration  Response: Demonstrated Understanding    Precautions, taught by Jeniffer Boggs PTA at 3/11/2025  3:52 PM.  Learner: Patient  Readiness: Acceptance  Method: Demonstration  Response: Demonstrated Understanding    Body Mechanics, taught by Jeniffer Boggs PTA at 3/11/2025  3:52 PM.  Learner: Patient  Readiness: Acceptance  Method: Demonstration  Response: Demonstrated Understanding    Mobility Training, taught by Jeniffer Boggs PTA at 3/11/2025  3:52 PM.  Learner: Patient  Readiness: Acceptance  Method: Demonstration  Response: Demonstrated Understanding    Home Exercise Program, taught by Jeniffer Boggs PTA at 3/11/2025  1:42 PM.  Learner: Patient  Readiness: Acceptance  Method: Demonstration  Response: Demonstrated Understanding    Handouts, taught by Jeniffer Boggs PTA at 3/11/2025  1:42 PM.  Learner: Patient  Readiness: Acceptance  Method: Demonstration  Response: Demonstrated Understanding    Precautions, taught by Jeniffer Boggs PTA at 3/11/2025  1:42 PM.  Learner: Patient  Readiness: Acceptance  Method: Demonstration  Response: Demonstrated Understanding    Body Mechanics, taught by Jeniffer Boggs PTA at 3/11/2025  1:42 PM.  Learner: Patient  Readiness: Acceptance  Method: Demonstration  Response: Demonstrated Understanding    Mobility Training, taught by Jeniffer Boggs PTA at 3/11/2025  1:42 PM.  Learner: Patient  Readiness: Acceptance  Method: Demonstration  Response: Demonstrated Understanding    Education Comments  No comments found.           Encounter Problems       Encounter Problems (Active)       PT Problem       PT Goal 1 STG - Pt will transition supine <> sitting with MOD I  (Progressing)       Start:  03/10/25    Expected End:  03/12/25            PT Goal 2 STG - Pt will perform a B LE ther ex program of 2-3 sets of 10   (Progressing)       Start:  03/10/25    Expected End:  03/12/25            PT Goal 3 STG - Pt will transfer STS with MOD I  (Progressing)       Start:  03/10/25    Expected End:  03/12/25            PT Goal 4 STG - Pt will amb 75' using FWW with MOD I  (Progressing)       Start:  03/10/25    Expected End:  03/12/25            PT Goal 5 STG -  Pt will navigate 10 stairs using HR with supervision  (Progressing)       Start:  03/10/25    Expected End:  03/12/25               Pain - Adult

## 2025-03-11 NOTE — PROGRESS NOTES
Physical Therapy    Physical Therapy Treatment    Patient Name: Trip Brown  MRN: [id number]  Department: [facility]  Room: [address] [place], [place] [address]  Today's Date: 3/11/2025  Time Calculation  Start Time: 1001  Stop Time: 1046  Time Calculation (min): 45 min         Assessment/Plan         PT Plan  Treatment/Interventions: Bed mobility, Transfer training, Gait training, Stair training, Balance training, Strengthening, Endurance training, Range of motion, Therapeutic exercise, Therapeutic activity, Home exercise program, Positioning  PT Plan: Ongoing PT  PT Frequency: BID  PT Discharge Recommendations: Low intensity level of continued care  PT - OK to Discharge: Yes      General Visit Information:   PT  Visit  PT Received On: 03/11/25       Subjective                    Objective   Pain:7/10 r knee                            Treatments:  Therapeutic Exercise  Therapeutic Exercise Performed:  (supine rle ap's, qs,hs,saqs,slrs with assist, laqs,sitting heelslides x 20 reps ea  r knee arom 0-70 degree supine  80 degrees sitting  cold pack to r knee post rx)    Bed Mobility  Bed Mobility:  (supine to sit and sit to supine sba)    Ambulation/Gait Training  Ambulation/Gait Training Performed:  (ambulated 45 feet x 2 using fixed wheeled walker cga   wbat rle   sob- weezing after gait)  Transfers  Transfer:  (sit to stand cga)         Outcome Measures:  [facility] Basic Mobility  Turning from your back to your side while in a flat bed without using bedrails: A little  Moving from lying on your back to sitting on the side of a flat bed without using bedrails: A little  Moving to and from bed to chair (including a wheelchair): A little  Standing up from a chair using your arms (e.g. wheelchair or bedside chair): A little  To walk in hospital room: A little  Climbing 3-5 steps with railing: Total  Basic Mobility - Total Score: 16    Education Documentation  Home Exercise Program, taught by Jeniffer Boggs PTA at 3/11/2025  1:42 PM.  Learner:  Patient  Readiness: Acceptance  Method: Demonstration  Response: Demonstrated Understanding    Handouts, taught by Jeniffer Boggs PTA at 3/11/2025  1:42 PM.  Learner: Patient  Readiness: Acceptance  Method: Demonstration  Response: Demonstrated Understanding    Precautions, taught by Jeniffer Boggs PTA at 3/11/2025  1:42 PM.  Learner: Patient  Readiness: Acceptance  Method: Demonstration  Response: Demonstrated Understanding    Body Mechanics, taught by Jeniffer Boggs PTA at 3/11/2025  1:42 PM.  Learner: Patient  Readiness: Acceptance  Method: Demonstration  Response: Demonstrated Understanding    Mobility Training, taught by Jeniffer Boggs PTA at 3/11/2025  1:42 PM.  Learner: Patient  Readiness: Acceptance  Method: Demonstration  Response: Demonstrated Understanding    Education Comments  No comments found.               Encounter Problems       Encounter Problems (Active)       PT Problem       PT Goal 1 STG - Pt will transition supine <> sitting with MOD I  (Progressing)       Start:  03/10/25    Expected End:  03/12/25            PT Goal 2 STG - Pt will perform a B LE ther ex program of 2-3 sets of 10  (Progressing)       Start:  03/10/25    Expected End:  03/12/25            PT Goal 3 STG - Pt will transfer STS with MOD I  (Progressing)       Start:  03/10/25    Expected End:  03/12/25            PT Goal 4 STG - Pt will amb 75' using FWW with MOD I  (Progressing)       Start:  03/10/25    Expected End:  03/12/25            PT Goal 5 STG -  Pt will navigate 10 stairs using HR with supervision  (Progressing)       Start:  03/10/25    Expected End:  03/12/25               Pain - Adult

## 2025-03-11 NOTE — PROGRESS NOTES
Occupational Therapy    OT Treatment    Patient Name: Trip Brown  MRN: [id number]  Department: [facility]  Room: [address] [place] [address]  Today's Date: 3/11/2025  Time Calculation  Start Time: 0900  Stop Time: 0940  Time Calculation (min): 40 min        Assessment:  End of Session Communication: Care Coordinator, Bedside nurse  End of Session Patient Position: Up in chair, Alarm off, not on at start of session     Plan:  Treatment Interventions: ADL retraining, Functional transfer training, Patient/family training, Equipment evaluation/education, Endurance training, Compensatory technique education (TKR precautions training)  OT Frequency: BID (as needed)  OT Discharge Recommendations: Low intensity level of continued care  OT - OK to Discharge: Yes  Treatment Interventions: ADL retraining, Functional transfer training, Patient/family training, Equipment evaluation/education, Endurance training, Compensatory technique education (TKR precautions training)    Subjective   Previous Visit Info:  OT Last Visit  OT Received On: 03/11/25  General:  General  Prior to Session Communication: Bedside nurse  Patient Position Received: Up in chair, Alarm off, not on at start of session  General Comment: pleasant and cooperative, wheezing noted with exertion and pulse ox WNL. nursing notifed  Precautions:  LE Weight Bearing Status: Weight Bearing as Tolerated  Medical Precautions: Fall precautions  Post-Surgical Precautions: Right total knee precautions            Pain:  Pain Assessment  Pain Assessment:  (moderate c/o pain in R knee. ice pack applied at end of therapy session)    Objective         Activities of Daily Living: LE Dressing  LE Dressing: Yes  Pants Level of Assistance: Close supervision  Sock Level of Assistance: Close supervision  Compression Hose Level of Assistance: Minimum assistance. Pt educated on use of compression stockings, importance of compression stockings, donning/doffing, and wear schedule.  Adult Briefs Level of  Assistance: Close supervision  LE Dressing Where Assessed: Chair  Pt educated on TKR precautions as applied to self care tasks and transfers. Pt verbalized and demonstrated understanding throughout tx session.     Functional Standing Tolerance:  Time: 5:00 standing at FWW to complete grooming tasks  Bed Mobility/Transfers: Transfers  Transfer: Yes  Transfer 1  Technique 1: Sit to stand, Stand to sit  Transfer Device 1: Walker  Transfer Level of Assistance 1: Close supervision    Tub Transfers  Tub Transfers Comments: .Pt educated on transferring in/out of tub adhering to precautions. This therapist suggested pt to  complete with home health therapist to complete in home set up for further recommendations and increased safety.       Car Transfers  Car Transfers Comments: .Pt educated on transferring in/out of car adhering to precautions. Following demonstration from this therapist pt verbalized understanding.         Functional Mobility:  Functional Mobility  Functional Mobility Performed: Yes  Functional Mobility 1  Device 1: Rolling walker  Assistance 1: Contact guard  Comments 1: pt ambulated in/out of bathroom with min vc for increased safety      Outcome Measures:[facility] Daily Activity  Putting on and taking off regular lower body clothing: A little  Bathing (including washing, rinsing, drying): A little  Putting on and taking off regular upper body clothing: None  Toileting, which includes using toilet, bedpan or urinal: A little  Taking care of personal grooming such as brushing teeth: None  Eating Meals: None  Daily Activity - Total Score: 21        Education Documentation  Body Mechanics, taught by NICK Ray at 3/11/2025 10:27 AM.  Learner: Patient  Readiness: Acceptance  Method: Explanation  Response: Verbalizes Understanding    Precautions, taught by NICK Ray at 3/11/2025 10:27 AM.  Learner: Patient  Readiness: Acceptance  Method: Explanation  Response: Verbalizes  Understanding    ADL Training, taught by NICK Ray at 3/11/2025 10:27 AM.  Learner: Patient  Readiness: Acceptance  Method: Explanation  Response: Verbalizes Understanding    Education Comments  No comments found.               Goals:  Encounter Problems       Encounter Problems (Active)       OT Goals       Patient will complete lower body bathing/dressing; toileting with minimal assist using assistive techniques/adaptive equipment as needed  (Progressing)       Start:  03/10/25    Expected End:  03/11/25            Patient will perform functional transfers with supervision:  bed, chair, commode using DME as needed and simulated car transfer with minimal assist  (Progressing)       Start:  03/10/25    Expected End:  03/11/25            Patient will adhere to TKR precautions to ADL's and functional transfers/mobility  (Progressing)       Start:  03/10/25    Expected End:  03/11/25            Patient will tolerate standing for 5 mins. and show overall good (-) standing balance during ADL's and functional transfers/mobility  (Progressing)       Start:  03/10/25    Expected End:  03/11/25

## 2025-03-11 NOTE — CARE PLAN
The clinical goals for the shift include safety/ pain relief      Problem: Pain - Adult  Goal: Verbalizes/displays adequate comfort level or baseline comfort level  Outcome: Progressing     Problem: Safety - Adult  Goal: Free from fall injury  Outcome: Progressing

## 2025-03-12 ENCOUNTER — APPOINTMENT (OUTPATIENT)
Dept: RADIOLOGY | Facility: HOSPITAL | Age: 65
End: 2025-03-12
Payer: MEDICARE

## 2025-03-12 ENCOUNTER — APPOINTMENT (OUTPATIENT)
Dept: CARDIOLOGY | Facility: HOSPITAL | Age: 65
End: 2025-03-12
Payer: MEDICARE

## 2025-03-12 LAB
ANION GAP SERPL CALC-SCNC: 16 MMOL/L (ref 10–20)
BUN SERPL-MCNC: 19 MG/DL (ref 6–23)
CALCIUM SERPL-MCNC: 9.1 MG/DL (ref 8.6–10.3)
CHLORIDE SERPL-SCNC: 108 MMOL/L (ref 98–107)
CO2 SERPL-SCNC: 16 MMOL/L (ref 21–32)
CREAT SERPL-MCNC: 1.32 MG/DL (ref 0.5–1.3)
EGFRCR SERPLBLD CKD-EPI 2021: 60 ML/MIN/1.73M*2
GLUCOSE BLD MANUAL STRIP-MCNC: 177 MG/DL (ref 74–99)
GLUCOSE SERPL-MCNC: 131 MG/DL (ref 74–99)
HOLD SPECIMEN: NORMAL
MAGNESIUM SERPL-MCNC: 1.62 MG/DL (ref 1.6–2.4)
MAGNESIUM SERPL-MCNC: 1.77 MG/DL (ref 1.6–2.4)
POTASSIUM SERPL-SCNC: 4.7 MMOL/L (ref 3.5–5.3)
SODIUM SERPL-SCNC: 135 MMOL/L (ref 136–145)

## 2025-03-12 PROCEDURE — 99222 1ST HOSP IP/OBS MODERATE 55: CPT | Performed by: STUDENT IN AN ORGANIZED HEALTH CARE EDUCATION/TRAINING PROGRAM

## 2025-03-12 PROCEDURE — 2500000001 HC RX 250 WO HCPCS SELF ADMINISTERED DRUGS (ALT 637 FOR MEDICARE OP): Performed by: CLINICAL NURSE SPECIALIST

## 2025-03-12 PROCEDURE — 2500000001 HC RX 250 WO HCPCS SELF ADMINISTERED DRUGS (ALT 637 FOR MEDICARE OP)

## 2025-03-12 PROCEDURE — 93010 ELECTROCARDIOGRAM REPORT: CPT | Performed by: STUDENT IN AN ORGANIZED HEALTH CARE EDUCATION/TRAINING PROGRAM

## 2025-03-12 PROCEDURE — 71275 CT ANGIOGRAPHY CHEST: CPT | Performed by: RADIOLOGY

## 2025-03-12 PROCEDURE — 94640 AIRWAY INHALATION TREATMENT: CPT

## 2025-03-12 PROCEDURE — 2500000002 HC RX 250 W HCPCS SELF ADMINISTERED DRUGS (ALT 637 FOR MEDICARE OP, ALT 636 FOR OP/ED): Performed by: STUDENT IN AN ORGANIZED HEALTH CARE EDUCATION/TRAINING PROGRAM

## 2025-03-12 PROCEDURE — 2500000004 HC RX 250 GENERAL PHARMACY W/ HCPCS (ALT 636 FOR OP/ED)

## 2025-03-12 PROCEDURE — 82947 ASSAY GLUCOSE BLOOD QUANT: CPT

## 2025-03-12 PROCEDURE — 71275 CT ANGIOGRAPHY CHEST: CPT

## 2025-03-12 PROCEDURE — 2550000001 HC RX 255 CONTRASTS: Performed by: STUDENT IN AN ORGANIZED HEALTH CARE EDUCATION/TRAINING PROGRAM

## 2025-03-12 PROCEDURE — 93005 ELECTROCARDIOGRAM TRACING: CPT

## 2025-03-12 PROCEDURE — 2500000004 HC RX 250 GENERAL PHARMACY W/ HCPCS (ALT 636 FOR OP/ED): Performed by: STUDENT IN AN ORGANIZED HEALTH CARE EDUCATION/TRAINING PROGRAM

## 2025-03-12 PROCEDURE — 2500000001 HC RX 250 WO HCPCS SELF ADMINISTERED DRUGS (ALT 637 FOR MEDICARE OP): Performed by: STUDENT IN AN ORGANIZED HEALTH CARE EDUCATION/TRAINING PROGRAM

## 2025-03-12 PROCEDURE — 99222 1ST HOSP IP/OBS MODERATE 55: CPT | Performed by: INTERNAL MEDICINE

## 2025-03-12 PROCEDURE — 36415 COLL VENOUS BLD VENIPUNCTURE: CPT | Performed by: STUDENT IN AN ORGANIZED HEALTH CARE EDUCATION/TRAINING PROGRAM

## 2025-03-12 PROCEDURE — 99231 SBSQ HOSP IP/OBS SF/LOW 25: CPT | Performed by: CLINICAL NURSE SPECIALIST

## 2025-03-12 PROCEDURE — 1200000002 HC GENERAL ROOM WITH TELEMETRY DAILY

## 2025-03-12 PROCEDURE — 83735 ASSAY OF MAGNESIUM: CPT

## 2025-03-12 PROCEDURE — 80048 BASIC METABOLIC PNL TOTAL CA: CPT | Performed by: STUDENT IN AN ORGANIZED HEALTH CARE EDUCATION/TRAINING PROGRAM

## 2025-03-12 RX ORDER — METOPROLOL TARTRATE 1 MG/ML
INJECTION, SOLUTION INTRAVENOUS
Status: COMPLETED
Start: 2025-03-12 | End: 2025-03-12

## 2025-03-12 RX ORDER — SODIUM CHLORIDE 9 MG/ML
100 INJECTION, SOLUTION INTRAVENOUS CONTINUOUS
Status: ACTIVE | OUTPATIENT
Start: 2025-03-12 | End: 2025-03-12

## 2025-03-12 RX ORDER — LANOLIN ALCOHOL/MO/W.PET/CERES
400 CREAM (GRAM) TOPICAL ONCE
Status: COMPLETED | OUTPATIENT
Start: 2025-03-12 | End: 2025-03-12

## 2025-03-12 RX ORDER — METOPROLOL TARTRATE 1 MG/ML
5 INJECTION, SOLUTION INTRAVENOUS ONCE
Status: COMPLETED | OUTPATIENT
Start: 2025-03-12 | End: 2025-03-12

## 2025-03-12 RX ORDER — OXYCODONE HYDROCHLORIDE 5 MG/1
5 TABLET ORAL EVERY 6 HOURS PRN
Status: DISCONTINUED | OUTPATIENT
Start: 2025-03-12 | End: 2025-03-14 | Stop reason: HOSPADM

## 2025-03-12 RX ORDER — DILTIAZEM HYDROCHLORIDE 240 MG/1
240 CAPSULE, COATED, EXTENDED RELEASE ORAL DAILY
Status: DISCONTINUED | OUTPATIENT
Start: 2025-03-12 | End: 2025-03-14 | Stop reason: HOSPADM

## 2025-03-12 RX ADMIN — ALBUTEROL SULFATE 2.5 MG: 2.5 SOLUTION RESPIRATORY (INHALATION) at 00:01

## 2025-03-12 RX ADMIN — FORMOTEROL FUMARATE DIHYDRATE 20 MCG: 20 SOLUTION RESPIRATORY (INHALATION) at 20:03

## 2025-03-12 RX ADMIN — DOCUSATE SODIUM 100 MG: 100 CAPSULE, LIQUID FILLED ORAL at 21:14

## 2025-03-12 RX ADMIN — FORMOTEROL FUMARATE DIHYDRATE 20 MCG: 20 SOLUTION RESPIRATORY (INHALATION) at 06:55

## 2025-03-12 RX ADMIN — TIOTROPIUM BROMIDE INHALATION SPRAY 2 PUFF: 3.12 SPRAY, METERED RESPIRATORY (INHALATION) at 06:53

## 2025-03-12 RX ADMIN — OLANZAPINE 7.5 MG: 5 TABLET, FILM COATED ORAL at 21:14

## 2025-03-12 RX ADMIN — DOCUSATE SODIUM 100 MG: 100 CAPSULE, LIQUID FILLED ORAL at 10:25

## 2025-03-12 RX ADMIN — OXYCODONE HYDROCHLORIDE 5 MG: 5 TABLET ORAL at 18:21

## 2025-03-12 RX ADMIN — TAMSULOSIN HYDROCHLORIDE 0.4 MG: 0.4 CAPSULE ORAL at 10:24

## 2025-03-12 RX ADMIN — TRAZODONE HYDROCHLORIDE 100 MG: 50 TABLET ORAL at 21:14

## 2025-03-12 RX ADMIN — METOPROLOL TARTRATE 5 MG: 5 INJECTION INTRAVENOUS at 07:38

## 2025-03-12 RX ADMIN — ASPIRIN 81 MG: 81 TABLET, COATED ORAL at 10:23

## 2025-03-12 RX ADMIN — IOHEXOL 75 ML: 350 INJECTION, SOLUTION INTRAVENOUS at 09:35

## 2025-03-12 RX ADMIN — CYCLOBENZAPRINE 10 MG: 10 TABLET, FILM COATED ORAL at 10:24

## 2025-03-12 RX ADMIN — ASPIRIN 81 MG: 81 TABLET, COATED ORAL at 21:14

## 2025-03-12 RX ADMIN — METOPROLOL TARTRATE 5 MG: 1 INJECTION, SOLUTION INTRAVENOUS at 07:38

## 2025-03-12 RX ADMIN — LITHIUM CARBONATE 300 MG: 300 CAPSULE, GELATIN COATED ORAL at 10:25

## 2025-03-12 RX ADMIN — ACETAMINOPHEN 975 MG: 325 TABLET, FILM COATED ORAL at 06:18

## 2025-03-12 RX ADMIN — OXYCODONE HYDROCHLORIDE 10 MG: 5 TABLET ORAL at 04:48

## 2025-03-12 RX ADMIN — DILTIAZEM HYDROCHLORIDE 240 MG: 240 CAPSULE, COATED, EXTENDED RELEASE ORAL at 07:42

## 2025-03-12 RX ADMIN — MAGNESIUM OXIDE TAB 400 MG (241.3 MG ELEMENTAL MG) 400 MG: 400 (241.3 MG) TAB at 10:25

## 2025-03-12 RX ADMIN — PANTOPRAZOLE SODIUM 40 MG: 40 TABLET, DELAYED RELEASE ORAL at 06:18

## 2025-03-12 RX ADMIN — SODIUM CHLORIDE, POTASSIUM CHLORIDE, SODIUM LACTATE AND CALCIUM CHLORIDE 1000 ML: 600; 310; 30; 20 INJECTION, SOLUTION INTRAVENOUS at 07:40

## 2025-03-12 RX ADMIN — ALBUTEROL SULFATE 2.5 MG: 2.5 SOLUTION RESPIRATORY (INHALATION) at 06:55

## 2025-03-12 RX ADMIN — LITHIUM CARBONATE 300 MG: 300 CAPSULE, GELATIN COATED ORAL at 21:14

## 2025-03-12 RX ADMIN — SODIUM CHLORIDE 100 ML/HR: 9 INJECTION, SOLUTION INTRAVENOUS at 10:19

## 2025-03-12 RX ADMIN — FLUOXETINE HYDROCHLORIDE 60 MG: 20 CAPSULE ORAL at 10:24

## 2025-03-12 SDOH — ECONOMIC STABILITY: FOOD INSECURITY: WITHIN THE PAST 12 MONTHS, YOU WORRIED THAT YOUR FOOD WOULD RUN OUT BEFORE YOU GOT THE MONEY TO BUY MORE.: NEVER TRUE

## 2025-03-12 SDOH — ECONOMIC STABILITY: INCOME INSECURITY: IN THE PAST 12 MONTHS HAS THE ELECTRIC, GAS, OIL, OR WATER COMPANY THREATENED TO SHUT OFF SERVICES IN YOUR HOME?: NO

## 2025-03-12 SDOH — SOCIAL STABILITY: SOCIAL INSECURITY: HAVE YOU HAD ANY THOUGHTS OF HARMING ANYONE ELSE?: NO

## 2025-03-12 SDOH — ECONOMIC STABILITY: HOUSING INSECURITY: IN THE PAST 12 MONTHS, HOW MANY TIMES HAVE YOU MOVED WHERE YOU WERE LIVING?: 0

## 2025-03-12 SDOH — SOCIAL STABILITY: SOCIAL INSECURITY: HAS ANYONE EVER THREATENED TO HURT YOUR FAMILY OR YOUR PETS?: NO

## 2025-03-12 SDOH — ECONOMIC STABILITY: FOOD INSECURITY: WITHIN THE PAST 12 MONTHS, THE FOOD YOU BOUGHT JUST DIDN'T LAST AND YOU DIDN'T HAVE MONEY TO GET MORE.: NEVER TRUE

## 2025-03-12 SDOH — SOCIAL STABILITY: SOCIAL INSECURITY: ARE YOU OR HAVE YOU BEEN THREATENED OR ABUSED PHYSICALLY, EMOTIONALLY, OR SEXUALLY BY ANYONE?: NO

## 2025-03-12 SDOH — SOCIAL STABILITY: SOCIAL INSECURITY: WITHIN THE LAST YEAR, HAVE YOU BEEN AFRAID OF YOUR PARTNER OR EX-PARTNER?: NO

## 2025-03-12 SDOH — SOCIAL STABILITY: SOCIAL INSECURITY: DOES ANYONE TRY TO KEEP YOU FROM HAVING/CONTACTING OTHER FRIENDS OR DOING THINGS OUTSIDE YOUR HOME?: NO

## 2025-03-12 SDOH — SOCIAL STABILITY: SOCIAL INSECURITY: HAVE YOU HAD THOUGHTS OF HARMING ANYONE ELSE?: NO

## 2025-03-12 SDOH — SOCIAL STABILITY: SOCIAL INSECURITY: ARE THERE ANY APPARENT SIGNS OF INJURIES/BEHAVIORS THAT COULD BE RELATED TO ABUSE/NEGLECT?: NO

## 2025-03-12 SDOH — ECONOMIC STABILITY: FOOD INSECURITY: HOW HARD IS IT FOR YOU TO PAY FOR THE VERY BASICS LIKE FOOD, HOUSING, MEDICAL CARE, AND HEATING?: NOT VERY HARD

## 2025-03-12 SDOH — ECONOMIC STABILITY: HOUSING INSECURITY: IN THE LAST 12 MONTHS, WAS THERE A TIME WHEN YOU WERE NOT ABLE TO PAY THE MORTGAGE OR RENT ON TIME?: NO

## 2025-03-12 SDOH — ECONOMIC STABILITY: TRANSPORTATION INSECURITY: IN THE PAST 12 MONTHS, HAS LACK OF TRANSPORTATION KEPT YOU FROM MEDICAL APPOINTMENTS OR FROM GETTING MEDICATIONS?: NO

## 2025-03-12 SDOH — SOCIAL STABILITY: SOCIAL INSECURITY: WITHIN THE LAST YEAR, HAVE YOU BEEN HUMILIATED OR EMOTIONALLY ABUSED IN OTHER WAYS BY YOUR PARTNER OR EX-PARTNER?: NO

## 2025-03-12 SDOH — ECONOMIC STABILITY: HOUSING INSECURITY: AT ANY TIME IN THE PAST 12 MONTHS, WERE YOU HOMELESS OR LIVING IN A SHELTER (INCLUDING NOW)?: NO

## 2025-03-12 SDOH — SOCIAL STABILITY: SOCIAL INSECURITY: DO YOU FEEL UNSAFE GOING BACK TO THE PLACE WHERE YOU ARE LIVING?: NO

## 2025-03-12 SDOH — SOCIAL STABILITY: SOCIAL INSECURITY
ASK PARENT OR GUARDIAN: ARE THERE TIMES WHEN YOU, YOUR CHILD(REN), OR ANY MEMBER OF YOUR HOUSEHOLD FEEL UNSAFE, HARMED, OR THREATENED AROUND PERSONS WITH WHOM YOU KNOW OR LIVE?: NO

## 2025-03-12 SDOH — SOCIAL STABILITY: SOCIAL INSECURITY: WERE YOU ABLE TO COMPLETE ALL THE BEHAVIORAL HEALTH SCREENINGS?: YES

## 2025-03-12 SDOH — SOCIAL STABILITY: SOCIAL INSECURITY: ABUSE: ADULT

## 2025-03-12 SDOH — SOCIAL STABILITY: SOCIAL INSECURITY: DO YOU FEEL ANYONE HAS EXPLOITED OR TAKEN ADVANTAGE OF YOU FINANCIALLY OR OF YOUR PERSONAL PROPERTY?: NO

## 2025-03-12 ASSESSMENT — COGNITIVE AND FUNCTIONAL STATUS - GENERAL
TOILETING: A LITTLE
PATIENT BASELINE BEDBOUND: NO
DAILY ACTIVITIY SCORE: 18
PERSONAL GROOMING: A LITTLE
DRESSING REGULAR LOWER BODY CLOTHING: A LOT
HELP NEEDED FOR BATHING: A LITTLE
MOVING FROM LYING ON BACK TO SITTING ON SIDE OF FLAT BED WITH BEDRAILS: A LITTLE
DAILY ACTIVITIY SCORE: 18
MOVING FROM LYING ON BACK TO SITTING ON SIDE OF FLAT BED WITH BEDRAILS: A LITTLE
PERSONAL GROOMING: A LITTLE
MOBILITY SCORE: 18
HELP NEEDED FOR BATHING: A LITTLE
TURNING FROM BACK TO SIDE WHILE IN FLAT BAD: A LITTLE
CLIMB 3 TO 5 STEPS WITH RAILING: A LITTLE
TURNING FROM BACK TO SIDE WHILE IN FLAT BAD: A LITTLE
PERSONAL GROOMING: A LITTLE
HELP NEEDED FOR BATHING: A LITTLE
DRESSING REGULAR UPPER BODY CLOTHING: A LITTLE
STANDING UP FROM CHAIR USING ARMS: A LITTLE
TURNING FROM BACK TO SIDE WHILE IN FLAT BAD: A LITTLE
DRESSING REGULAR UPPER BODY CLOTHING: A LITTLE
MOBILITY SCORE: 18
CLIMB 3 TO 5 STEPS WITH RAILING: A LITTLE
CLIMB 3 TO 5 STEPS WITH RAILING: A LITTLE
STANDING UP FROM CHAIR USING ARMS: A LITTLE
DAILY ACTIVITIY SCORE: 18
DRESSING REGULAR LOWER BODY CLOTHING: A LOT
TOILETING: A LITTLE
DRESSING REGULAR LOWER BODY CLOTHING: A LOT
WALKING IN HOSPITAL ROOM: A LITTLE
MOVING FROM LYING ON BACK TO SITTING ON SIDE OF FLAT BED WITH BEDRAILS: A LITTLE
MOVING TO AND FROM BED TO CHAIR: A LITTLE
MOVING TO AND FROM BED TO CHAIR: A LITTLE
DRESSING REGULAR UPPER BODY CLOTHING: A LITTLE
MOVING TO AND FROM BED TO CHAIR: A LITTLE
WALKING IN HOSPITAL ROOM: A LITTLE
WALKING IN HOSPITAL ROOM: A LITTLE
MOBILITY SCORE: 18
STANDING UP FROM CHAIR USING ARMS: A LITTLE
TOILETING: A LITTLE

## 2025-03-12 ASSESSMENT — PATIENT HEALTH QUESTIONNAIRE - PHQ9
SUM OF ALL RESPONSES TO PHQ9 QUESTIONS 1 & 2: 0
2. FEELING DOWN, DEPRESSED OR HOPELESS: NOT AT ALL
1. LITTLE INTEREST OR PLEASURE IN DOING THINGS: NOT AT ALL

## 2025-03-12 ASSESSMENT — PAIN - FUNCTIONAL ASSESSMENT
PAIN_FUNCTIONAL_ASSESSMENT: 0-10

## 2025-03-12 ASSESSMENT — LIFESTYLE VARIABLES
SKIP TO QUESTIONS 9-10: 1
AUDIT-C TOTAL SCORE: 0
AUDIT-C TOTAL SCORE: 0
HOW OFTEN DO YOU HAVE A DRINK CONTAINING ALCOHOL: NEVER
HOW MANY STANDARD DRINKS CONTAINING ALCOHOL DO YOU HAVE ON A TYPICAL DAY: PATIENT DOES NOT DRINK
HOW OFTEN DO YOU HAVE 6 OR MORE DRINKS ON ONE OCCASION: NEVER

## 2025-03-12 ASSESSMENT — ACTIVITIES OF DAILY LIVING (ADL)
HEARING - RIGHT EAR: FUNCTIONAL
FEEDING YOURSELF: INDEPENDENT
BATHING: INDEPENDENT
DRESSING YOURSELF: NEEDS ASSISTANCE
HEARING - LEFT EAR: FUNCTIONAL
LACK_OF_TRANSPORTATION: NO
LACK_OF_TRANSPORTATION: NO
JUDGMENT_ADEQUATE_SAFELY_COMPLETE_DAILY_ACTIVITIES: YES
ADEQUATE_TO_COMPLETE_ADL: YES
PATIENT'S MEMORY ADEQUATE TO SAFELY COMPLETE DAILY ACTIVITIES?: YES
WALKS IN HOME: NEEDS ASSISTANCE
TOILETING: NEEDS ASSISTANCE
GROOMING: INDEPENDENT
ASSISTIVE_DEVICE: WALKER

## 2025-03-12 ASSESSMENT — PAIN DESCRIPTION - LOCATION: LOCATION: KNEE

## 2025-03-12 ASSESSMENT — PAIN SCALES - GENERAL
PAINLEVEL_OUTOF10: 0 - NO PAIN
PAINLEVEL_OUTOF10: 0 - NO PAIN
PAINLEVEL_OUTOF10: 7
PAINLEVEL_OUTOF10: 9
PAINLEVEL_OUTOF10: 3
PAINLEVEL_OUTOF10: 8

## 2025-03-12 ASSESSMENT — PAIN DESCRIPTION - ORIENTATION: ORIENTATION: RIGHT

## 2025-03-12 ASSESSMENT — COLUMBIA-SUICIDE SEVERITY RATING SCALE - C-SSRS
6. HAVE YOU EVER DONE ANYTHING, STARTED TO DO ANYTHING, OR PREPARED TO DO ANYTHING TO END YOUR LIFE?: NO
1. IN THE PAST MONTH, HAVE YOU WISHED YOU WERE DEAD OR WISHED YOU COULD GO TO SLEEP AND NOT WAKE UP?: NO
2. HAVE YOU ACTUALLY HAD ANY THOUGHTS OF KILLING YOURSELF?: NO

## 2025-03-12 ASSESSMENT — PAIN SCALES - PAIN ASSESSMENT IN ADVANCED DEMENTIA (PAINAD): TOTALSCORE: MEDICATION (SEE MAR)

## 2025-03-12 ASSESSMENT — PAIN DESCRIPTION - DESCRIPTORS: DESCRIPTORS: SORE

## 2025-03-12 NOTE — CARE PLAN
Problem: Pain - Adult  Goal: Verbalizes/displays adequate comfort level or baseline comfort level  Outcome: Progressing     Problem: Safety - Adult  Goal: Free from fall injury  Outcome: Progressing     Problem: Discharge Planning  Goal: Discharge to home or other facility with appropriate resources  Outcome: Progressing     Problem: Chronic Conditions and Co-morbidities  Goal: Patient's chronic conditions and co-morbidity symptoms are monitored and maintained or improved  Outcome: Progressing     Problem: Nutrition  Goal: Nutrient intake appropriate for maintaining nutritional needs  Outcome: Progressing     Problem: Fall/Injury  Goal: Not fall by end of shift  Outcome: Progressing  Goal: Be free from injury by end of the shift  Outcome: Progressing  Goal: Use assistive devices by end of the shift  Outcome: Progressing     Problem: Skin  Goal: Decreased wound size/increased tissue granulation at next dressing change  Outcome: Progressing  Goal: Promote skin healing  Outcome: Progressing

## 2025-03-12 NOTE — SIGNIFICANT EVENT
[facility] Medicine Rapid Response Note    Name: Trip Brown  Age: 64 y.o.  Location: 211/211-A  Code Status: Full Code      Assessment/Plan:  Sinus Tachycardia, concern for possible SVT    Patient was given 5mg IVP lopressor with improvement in HR during response  Will give scheduled 240mg cardizem now  Discontinue albuterol/duonebs as possibly triggering episodes  Will transfer patient to telemetry floor for cardiac monitoring  Cardiology consulted  Wells Score for PE 3; given patient's recent orthopedic surgery and subjective dyspnea will check CTPE. Will hydrate with IVF prior to study. Patient given crystalloid bolus and continued on  ml/hr.    Disposition: Acute Care    Narrative Summary:  Trip Brown is a 63 yo man with PMH of supraventricular tachycardia status post ablation 4/2023 [facility], frequent PVCs, HTN, COPD/asthma, lung nodule, bilateral SVC, coronary calcifications per OSH CT ([facility] 6/2022), possible TIA, hepatitis C status post treatment, BPAD/MDD, prior smoking/cocaine/heroin use who is post-op right total knee arthroplasty 3/10.    Rapid Response was called at 7:42 for tachycardia. Per nursing, the patient had been tachycardic in the 200s prior to arrival for response team. EKGs done showed sinus tachycardia to 150s. Patient was given 5mg IVP lopressor with improvement in his HR to 60-80s. Patient denied any chest pain, but endorsed feeling light headed during the episode. Patient was bolused crystalloid. Patient was also given scheduled cardizem dose early as well. Albuterol breathing treatments were held.    Physical Exam:    /71   Pulse 75   Temp 36.9 °C (98.4 °F) (Temporal)   Resp 18   Ht 1.829 m (6')   Wt 92 kg (202 lb 13.2 oz)   SpO2 96%   BMI 27.51 kg/m²     Physical Exam:  GEN: conversant, NAD  HEENT: PERRL, EOMI, MMM OP clear, TMs clear bilaterally  NECK: supple, no cervical LAD, no carotid bruits  CV: tachycardic  PULM: CTAB  ABD: soft, NT, ND, BS+  EXT: no LE  edema  NEURO: no gross focal deficits  PSYCH: flat affect     Adam Mehta MD  [facility]

## 2025-03-12 NOTE — CONSULTS
"Inpatient consult to Cardiology  Consult performed by: James C Ramicone, DO  Consult ordered by: Kalyan Loja DO  Reason for consult: Supraventricular tachycardia          Reason for consultation:    Supraventricular tachycardia    History Of Present Illness:      This is a 64-year-old male with a history of SVT.  He underwent knee replacement surgery, then had episodes of SVT last night and this morning.  An episode occurred during the night following an albuterol treatment.  He had another episode of SVT this morning which required IV metoprolol.  The patient reports having a known history of SVT and states that he had a catheter ablation procedure in the past.  He cannot remember who his cardiologist is or when he had a catheter ablation.  He has been otherwise asymptomatic from a cardiac perspective.    Past medical history:    Asthma  Bipolar disorder  CKD  COPD  SVT    Past surgical history: Lumbar spine surgery  Knee replacement surgery March 10, 2025    Social history: Former smoker    Family history: Negative for premature CAD    Review of Systems    Other review of systems negative    Social History:  He reports that he quit smoking about 12 months ago. His smoking use included cigarettes. He started smoking about 41 years ago. He has a 20 pack-year smoking history. He has never used smokeless tobacco. He reports that he does not currently use alcohol. He reports that he does not currently use drugs after having used the following drugs: \"Crack\" cocaine, Cocaine, and Heroin.    Family History:  Family History   Adopted: Yes   Problem Relation Name Age of Onset    Stroke Father          Allergies:  Bupropion    Medications:  Current Facility-Administered Medications   Medication Dose Route Frequency Provider Last Rate Last Admin    acetaminophen (Tylenol) tablet 975 mg  975 mg oral q6h India Dickerson APRN-CNS   975 mg at 03/12/25 0618    aspirin EC tablet 81 mg  81 mg oral BID Geoffrey Urrutia MD   81 mg at " 03/11/25 2156    benzocaine-menthol (Cepastat Sore Throat) lozenge 1 lozenge  1 lozenge Mouth/Throat q4h PRN Geoffrey Urrutia MD        bisacodyl (Dulcolax) suppository 10 mg  10 mg rectal Daily PRN Geoffrey Urrutia MD        cyclobenzaprine (Flexeril) tablet 10 mg  10 mg oral TID PRN Geoffrey Urrutia MD   10 mg at 03/11/25 1936    dilTIAZem CD (Cardizem CD) 24 hr capsule 240 mg  240 mg oral Daily Bao Raymond MD   240 mg at 03/12/25 0742    docusate sodium (Colace) capsule 100 mg  100 mg oral BID Geoffery Urrutia MD   100 mg at 03/11/25 2157    FLUoxetine (PROzac) capsule 60 mg  60 mg oral Daily Geoffrey Urrutia MD   60 mg at 03/11/25 0808    fluticasone (Flonase) nasal spray 1 spray  1 spray Each Nostril Daily PRN Geoffrey Urrutia MD        tiotropium (Spiriva Respimat) 2.5 mcg/actuation inhaler 2 puff  2 puff inhalation Daily Geoffrey Urrutia MD   2 puff at 03/12/25 0653    And    formoterol (Perforomist) 20 mcg/2 mL nebulizer solution 20 mcg  20 mcg nebulization q12h Geoffrey Urrutia MD   20 mcg at 03/12/25 0655    ipratropium-albuteroL (Duo-Neb) 0.5-2.5 mg/3 mL nebulizer solution 3 mL  3 mL nebulization q2h PRN Geoffrey Urrutia MD   3 mL at 03/11/25 2055    lithium capsule 300 mg  300 mg oral BID Geoffrey Urrutia MD   300 mg at 03/11/25 2157    naloxone (Narcan) injection 0.2 mg  0.2 mg intravenous q5 min PRN Geoffrey Urrutia MD        OLANZapine (ZyPREXA) tablet 7.5 mg  7.5 mg oral Nightly Geoffrey Urrutia MD   7.5 mg at 03/11/25 2157    ondansetron ODT (Zofran-ODT) disintegrating tablet 4 mg  4 mg oral q8h PRN Geoffrey Urrutia MD        Or    ondansetron (Zofran) injection 4 mg  4 mg intravenous q8h PRN Geoffrey Urrutia MD        [Held by provider] oxyCODONE (Roxicodone) immediate release tablet 10 mg  10 mg oral q4h PRN Geoffrey Urrutia MD   10 mg at 03/12/25 0448    [Held by provider] oxyCODONE (Roxicodone) immediate release tablet 5 mg  5 mg oral q6h PRN Geoffrey Urrutia MD        oxygen (O2) therapy  2 L/min inhalation Continuous Geoffrey Urrutia MD   21 percent at 03/11/25  0618    pantoprazole (ProtoNix) EC tablet 40 mg  40 mg oral Daily before breakfast Geoffrey Urrutia MD   40 mg at 03/12/25 0618    polyethylene glycol (Glycolax, Miralax) packet 17 g  17 g oral Daily Geoffrey Urrutia MD   17 g at 03/11/25 0808    sodium chloride 0.9% infusion  100 mL/hr intravenous Continuous Bao Raymond MD        tamsulosin (Flomax) 24 hr capsule 0.4 mg  0.4 mg oral Daily Geoffrey Urrutia MD        traZODone (Desyrel) tablet 100 mg  100 mg oral Nightly Geoffrey Urrutia MD   100 mg at 03/11/25 2157         Last Recorded Vitals:  Vitals:    03/12/25 0001 03/12/25 0446 03/12/25 0653 03/12/25 0805   BP:  177/85  137/71   BP Location:  Left arm     Patient Position:  Lying     Pulse:  75     Resp:  18     Temp:  36.9 °C (98.4 °F)     TempSrc:  Temporal     SpO2: 97% 96% 96%    Weight:       Height:           Physical Exam:    General Appearance:  Alert, oriented, no distress  Skin:  Warm and dry  Head and Neck:  No elevation of JVP, no carotid bruits  Cardiac Exam:  Rhythm is regular, S1 and S2 are normal, no murmur S3 or S4  Lungs:  Clear to auscultation  Extremities:  no edema  Neurologic:  No focal deficits  Psychiatric:  Appropriate mood and behavior    Laboratory data:    CMP:  Recent Labs     03/12/25  0555 03/11/25  1441 03/11/25  0648 03/10/25  0701 03/03/25  1209 02/26/25  1142 11/25/24  1837 04/18/24  0608   * 136 137 140 140 137 138 136   K 4.7 4.2 4.3 4.2 4.2 6.4* 4.5 4.6   * 107 106 109* 109* 109* 108* 108*   CO2 16* 21 23 22 22 20* 23 18*   ANIONGAP 16 12 12 13 13 14 12 15   BUN 19 25* 26* 19 12 12 12 16   CREATININE 1.32* 1.62* 1.77* 1.46* 1.42* 1.34* 1.42* 1.25   EGFR 60* 47* 42* 53* 55* 59* 55* 64   MG 1.77 1.62  --   --   --   --  1.70  --      Recent Labs     11/25/24  1837   ALBUMIN 4.2   ALKPHOS 70   ALT 12   AST 16   BILITOT 0.4     CBC:  Recent Labs     03/11/25  0648 11/25/24  1837 04/18/24  0608 04/02/24  1451   WBC 12.6* 9.5 12.5* 7.2   HGB 14.0 15.8 14.5 16.8   HCT 42.9 46.2 41.3  "49.9    206 229 159   * 99 96 100     COAG:   Recent Labs     03/10/25  0709 03/03/25  1209 04/02/24  1451   INR 1.0 1.1 1.0     HEME/ENDO:  Recent Labs     04/02/24  1451   HGBA1C 4.9      CARDIAC:   Recent Labs     11/25/24  1837   TROPHS 7   No results for input(s): \"CHOL\", \"LDLF\", \"HDL\", \"TRIG\" in the last 85042 hours.    Test Review:  I have personally review the diagnostic testing:  ECG: Normal sinus rhythm, no ventricular preexcitation  SVT is a regular narrow QRS complex tachycardia, possibly atrial tachycardia  Holter monitor August 2023: Sinus rhythm with a minimum heart rate of 46 bpm, maximum heart rate 200 bpm, and average heart rate 67 bpm.  Episodes of SVT up to 1 minute and 21 seconds in duration.    Assessment/Plan:    1.  Supraventricular tachycardia: This patient had an episode of SVT which was treated with IV metoprolol.  He can remain on diltiazem once daily.  He states that these episodes are very infrequent.  If the episodes continue to occur, we could consider an EP study/catheter ablation in the future for more definitive therapy.  Continue medical management for now.                James C Ramicone, DO    "

## 2025-03-12 NOTE — PROGRESS NOTES
Physical Therapy                 Therapy Communication Note    Patient Name: Trip Brown  MRN: [id number]  Department: [facility]  Room: [address] [place] [address]-A  Today's Date: 3/12/2025     Discipline: Physical Therapy      Missed Visit Reason:  Updated patient plan of care this date due to complications with stay and transferred to tele floor. Pt short term goals remain appropriate. Due to social barriers and living environment updated discharge recommendation to moderate intensity.    Missed Time: Attempt    Comment:

## 2025-03-12 NOTE — ASSESSMENT & PLAN NOTE
CT scan of chest negative for PE.    Appreciate recommendations from cardiology as well as medical service.  Patient's BUN as normalized this am at 19.  Creatinine level decreased to 1.32.

## 2025-03-12 NOTE — NURSING NOTE
Report called to Joni MORALES on 3rd floor. This RN transferring patient to room 336 at this time.

## 2025-03-12 NOTE — CARE PLAN
Problem: Pain - Adult  Goal: Verbalizes/displays adequate comfort level or baseline comfort level  Outcome: Progressing     Problem: Safety - Adult  Goal: Free from fall injury  Outcome: Progressing     Problem: Discharge Planning  Goal: Discharge to home or other facility with appropriate resources  Outcome: Progressing     Problem: Chronic Conditions and Co-morbidities  Goal: Patient's chronic conditions and co-morbidity symptoms are monitored and maintained or improved  Outcome: Progressing     Problem: Nutrition  Goal: Nutrient intake appropriate for maintaining nutritional needs  Outcome: Progressing     Problem: Fall/Injury  Goal: Not fall by end of shift  Outcome: Progressing  Goal: Be free from injury by end of the shift  Outcome: Progressing  Goal: Use assistive devices by end of the shift  Outcome: Progressing     Problem: Skin  Goal: Decreased wound size/increased tissue granulation at next dressing change  Outcome: Progressing  Goal: Promote skin healing  Outcome: Progressing   The patient's goals for the shift include      The clinical goals for the shift include pain management

## 2025-03-12 NOTE — PROGRESS NOTES
Trip Brown is a 64 y.o. male on day 0 of admission presenting with Arthritis of right knee.    Subjective   Rapid response called this am for elevated heart rate. Cardiology consult obtained.    Patient with some confusion today.  Pain meds held by medical service        Objective     Physical Exam  Vitals reviewed.   Musculoskeletal:      Comments: Neurovascular status WNL RLE   Skin:     Comments: Mepilex dressing D&I to right knee incision         Last Recorded Vitals  Blood pressure 137/71, pulse 75, temperature 36.9 °C (98.4 °F), temperature source Temporal, resp. rate 18, height 1.829 m (6'), weight 92 kg (202 lb 13.2 oz), SpO2 96%.  Intake/Output last 3 Shifts:  I/O last 3 completed shifts:  In: 3515 (38.2 mL/kg) [P.O.:720; I.V.:1695 (18.4 mL/kg); IV Piggyback:1100]  Out: 500 (5.4 mL/kg) [Urine:500 (0.2 mL/kg/hr)]  Weight: 92 kg     Relevant Results      Scheduled medications  acetaminophen, 975 mg, oral, q6h  aspirin, 81 mg, oral, BID  dilTIAZem CD, 240 mg, oral, Daily  docusate sodium, 100 mg, oral, BID  FLUoxetine, 60 mg, oral, Daily  tiotropium, 2 puff, inhalation, Daily   And  formoterol, 20 mcg, nebulization, q12h  lithium, 300 mg, oral, BID  OLANZapine, 7.5 mg, oral, Nightly  pantoprazole, 40 mg, oral, Daily before breakfast  polyethylene glycol, 17 g, oral, Daily  tamsulosin, 0.4 mg, oral, Daily  traZODone, 100 mg, oral, Nightly      Continuous medications  oxygen, 2 L/min  sodium chloride 0.9%, 100 mL/hr, Last Rate: 100 mL/hr (03/12/25 1019)      PRN medications  PRN medications: benzocaine-menthol, bisacodyl, cyclobenzaprine, fluticasone, ipratropium-albuteroL, naloxone, ondansetron ODT **OR** ondansetron, [Held by provider] oxyCODONE, [Held by provider] oxyCODONE  Results for orders placed or performed during the hospital encounter of 03/10/25 (from the past 24 hours)   Basic metabolic panel   Result Value Ref Range    Glucose 132 (H) 74 - 99 mg/dL    Sodium 136 136 - 145 mmol/L     Potassium 4.2 3.5 - 5.3 mmol/L    Chloride 107 98 - 107 mmol/L    Bicarbonate 21 21 - 32 mmol/L    Anion Gap 12 10 - 20 mmol/L    Urea Nitrogen 25 (H) 6 - 23 mg/dL    Creatinine 1.62 (H) 0.50 - 1.30 mg/dL    eGFR 47 (L) >60 mL/min/1.73m*2    Calcium 8.9 8.6 - 10.3 mg/dL   Magnesium   Result Value Ref Range    Magnesium 1.62 1.60 - 2.40 mg/dL   Basic metabolic panel   Result Value Ref Range    Glucose 131 (H) 74 - 99 mg/dL    Sodium 135 (L) 136 - 145 mmol/L    Potassium 4.7 3.5 - 5.3 mmol/L    Chloride 108 (H) 98 - 107 mmol/L    Bicarbonate 16 (L) 21 - 32 mmol/L    Anion Gap 16 10 - 20 mmol/L    Urea Nitrogen 19 6 - 23 mg/dL    Creatinine 1.32 (H) 0.50 - 1.30 mg/dL    eGFR 60 (L) >60 mL/min/1.73m*2    Calcium 9.1 8.6 - 10.3 mg/dL   Lavender Top   Result Value Ref Range    Extra Tube Hold for add-ons.    Magnesium   Result Value Ref Range    Magnesium 1.77 1.60 - 2.40 mg/dL   POCT GLUCOSE   Result Value Ref Range    POCT Glucose 177 (H) 74 - 99 mg/dL                            Assessment/Plan   Assessment & Plan  Arthritis of right knee    S/P TKR (total knee replacement) not using cement, right    CT scan of chest negative for PE.    Appreciate recommendations from cardiology as well as medical service.  Patient's BUN as normalized this am at 19.  Creatinine level decreased to 1.32.     Continue therapy PT/OT with 100% WBS.  Plan discharge to SNF pending insurance approval.  Continue aspirin 81mg twice a day.  Bilateral SCDs to be worn while patient in bed.  Increase patient's activity as tolerated.  Await transfer to telemetry unit for further monitoring.             India Dickerson, APRN-CNS

## 2025-03-12 NOTE — PROGRESS NOTES
TCC Note: Pt received on the floor and notes reviewed for Transition of Care Coordination. Previous TCC notes stated, Pt will need SNF. First choice [place] [facility]. [place] [facility]  [place] placed.  [facility].  [facility] [place] approval. Have asked discharge team to initiate pre-cert. Will follow for insurance precert and update pt and family regarding that. Will also go meet with pt at bedside, introduce myself and ensure pt's comfortability on this floor and throughout the discharge process. Will continue to follow. Luz Sheppard, MSN, RN, TCC.

## 2025-03-12 NOTE — CONSULTS
Consults    Reason For Consult  Post operative medical management    History Of Present Illness  65 yo M with PMHx of polysubstance abuse (cocaine, EtOH, opioids), bipolar disorder, RLS, hepatitis C, HTN, HLD, SVT s/p catheter ablation, asthma/COPD overlap, and memory impairment presents s/p elective R total knee replacement. He is now POD#2. This AM pt went into SVT for which a rapid response was called and hospitalist service consulted to help aid in post operative medical management. SVT aborted with IVF Bolus and one dose of IV lopressor 5mg IV. He was then given his usual long acting cardizem which he had not received for the past 2 days. He did complain of increased sob and given recent orthopedic surgery, tachycardia, and sob a CTA was performed which was grossly unremarkable.     Surgical History  LHC, catheter ablation, colonoscopy, lumbar spine sx, L hand sx     Social History  History of polysubstance abuse (cocaine, EtOH, opioids) now sober for 4-5 years, lives in sober house    Family History  HTN, HLD, CVA    Allergies  Bupropion    Review of Systems  Tachycardia, post operative pain     Physical Exam  G: aox3, NAD, cooperative  HENT: neck supple, no JVD  Eyes: clear sclera  CV: tachy, reg rhythm s1 s2  L: clear  Abd: soft, NT, non distended  Ext: no c/c/e, R knee dressing  N: no appreciable acute focal deficits  Psych: appropriate mood and behavior    Last Recorded Vitals  /87 (BP Location: Left arm, Patient Position: Lying)   Pulse 70   Temp 36.9 °C (98.4 °F) (Temporal)   Resp 18   Wt 92 kg (202 lb 13.2 oz)   SpO2 97%      Assessment/Plan     SVT, h/o SVT s/p catheter ablation  R elective total knee replacement, POD#2    Polysubstance abuse (cocaine, EtOH, opioids)  Bipolar disorder  RLS  Hepatitis C  HTN, HLD  CKD III  Asthma/COPD overlap  Memory impairment   DVT ppx  Full code    Plan:  - SVT responded to one dose of IV lopressor, then given his long acting cardizem, CTA unremarkable  (performed due to sudden tachycardia, sob, post op) grossly unremarkable, continue IVF for short period of time after CTA to help prevent DORA, AM BMP, monitor on tele, was seen by EP since does have h/o SVT s/p ablation  - Stop scheduled albuterol, this can contribute to tachycardia, lungs clear and on RA, conitnue spiriva/performist  - Continue home psych meds  - Pain control, DVT ppx, PT/OT per primary orthopedic service    Kalyan Loja, DO

## 2025-03-12 NOTE — PROGRESS NOTES
Physical Therapy                 Therapy Communication Note    Patient Name: Trip Brown  MRN: [id number]  Department: [facility] 3  Room: [address] [place], [place] [address]  Today's Date: 3/12/2025     Discipline: Physical Therapy    PT Missed Visit:  (attempted to treat patient for r knee rom ex. patient was too agitated. rn aware.)     Missed Visit Reason:      Missed Time: Cancel    Comment:

## 2025-03-12 NOTE — NURSING NOTE
At 0730 [facility] alerted this RN & Christi RN that patient's HR was in the 160's  Upon entering room, patient's HR sustaining in 160 and patient complaining of SOB. Patient's /97  Rapid response initiated  EKG performed  New orders given for: 5mg IV Lopressor given at 0738, 1L LR bolus initiated at 0740, and for long acting cardizem to be given at 0742.   HR came down into 80's with blood pressure 146/92  Further new orders given for STAT CTA and transfer to telemetry floor  Nursing will continue to monitor

## 2025-03-12 NOTE — PROGRESS NOTES
Patient is POD #1 s/p TKR.  He resides in a sober home with a roommate who is not home often.  Has limited resources at home.  Patient is not allowed to be discharged home to the sober home with any narcotics.  Discussed discharge options with patient & pt's sister.  Both are hopeful for skilled nursing facility placement.  Based on patient's initial PT AM PAC 6 score, approval from his insurance is doubtful.  Provided patient & pt's sister with list of facilities within patient's insurance network.  First choice is [facility].  Back up choice is [facility].  Referrals placed.  [facility] unable to accept patient.  [facility] able to pending insurance approval.    Have asked discharge team to initiate pre-cert.

## 2025-03-13 DIAGNOSIS — M17.11 ARTHRITIS OF RIGHT KNEE: ICD-10-CM

## 2025-03-13 DIAGNOSIS — M17.12 ARTHRITIS OF LEFT KNEE: ICD-10-CM

## 2025-03-13 PROBLEM — R41.0 ACUTE CONFUSION: Status: ACTIVE | Noted: 2025-03-13

## 2025-03-13 LAB
ANION GAP SERPL CALC-SCNC: 14 MMOL/L (ref 10–20)
BUN SERPL-MCNC: 18 MG/DL (ref 6–23)
CALCIUM SERPL-MCNC: 9.1 MG/DL (ref 8.6–10.3)
CHLORIDE SERPL-SCNC: 108 MMOL/L (ref 98–107)
CO2 SERPL-SCNC: 19 MMOL/L (ref 21–32)
CREAT SERPL-MCNC: 1.14 MG/DL (ref 0.5–1.3)
EGFRCR SERPLBLD CKD-EPI 2021: 71 ML/MIN/1.73M*2
FOLATE SERPL-MCNC: 10.1 NG/ML
GLUCOSE SERPL-MCNC: 123 MG/DL (ref 74–99)
LITHIUM SERPL-SCNC: 0.88 MMOL/L (ref 0.6–1.2)
POTASSIUM SERPL-SCNC: 4.3 MMOL/L (ref 3.5–5.3)
SODIUM SERPL-SCNC: 137 MMOL/L (ref 136–145)
TREPONEMA PALLIDUM IGG+IGM AB [PRESENCE] IN SERUM OR PLASMA BY IMMUNOASSAY: NONREACTIVE
VIT B12 SERPL-MCNC: 264 PG/ML (ref 211–911)

## 2025-03-13 PROCEDURE — 83921 ORGANIC ACID SINGLE QUANT: CPT | Performed by: NURSE PRACTITIONER

## 2025-03-13 PROCEDURE — 86780 TREPONEMA PALLIDUM: CPT | Mod: PARLAB | Performed by: NURSE PRACTITIONER

## 2025-03-13 PROCEDURE — 2500000001 HC RX 250 WO HCPCS SELF ADMINISTERED DRUGS (ALT 637 FOR MEDICARE OP): Performed by: STUDENT IN AN ORGANIZED HEALTH CARE EDUCATION/TRAINING PROGRAM

## 2025-03-13 PROCEDURE — 99232 SBSQ HOSP IP/OBS MODERATE 35: CPT | Performed by: STUDENT IN AN ORGANIZED HEALTH CARE EDUCATION/TRAINING PROGRAM

## 2025-03-13 PROCEDURE — 94640 AIRWAY INHALATION TREATMENT: CPT

## 2025-03-13 PROCEDURE — 36415 COLL VENOUS BLD VENIPUNCTURE: CPT | Performed by: NURSE PRACTITIONER

## 2025-03-13 PROCEDURE — 80178 ASSAY OF LITHIUM: CPT | Mod: PARLAB | Performed by: NURSE PRACTITIONER

## 2025-03-13 PROCEDURE — 80048 BASIC METABOLIC PNL TOTAL CA: CPT | Performed by: STUDENT IN AN ORGANIZED HEALTH CARE EDUCATION/TRAINING PROGRAM

## 2025-03-13 PROCEDURE — 99231 SBSQ HOSP IP/OBS SF/LOW 25: CPT | Performed by: CLINICAL NURSE SPECIALIST

## 2025-03-13 PROCEDURE — 36415 COLL VENOUS BLD VENIPUNCTURE: CPT | Performed by: STUDENT IN AN ORGANIZED HEALTH CARE EDUCATION/TRAINING PROGRAM

## 2025-03-13 PROCEDURE — 99223 1ST HOSP IP/OBS HIGH 75: CPT | Performed by: NURSE PRACTITIONER

## 2025-03-13 PROCEDURE — 2500000001 HC RX 250 WO HCPCS SELF ADMINISTERED DRUGS (ALT 637 FOR MEDICARE OP)

## 2025-03-13 PROCEDURE — 82746 ASSAY OF FOLIC ACID SERUM: CPT | Mod: PARLAB | Performed by: NURSE PRACTITIONER

## 2025-03-13 PROCEDURE — 1200000002 HC GENERAL ROOM WITH TELEMETRY DAILY

## 2025-03-13 PROCEDURE — 2500000002 HC RX 250 W HCPCS SELF ADMINISTERED DRUGS (ALT 637 FOR MEDICARE OP, ALT 636 FOR OP/ED): Performed by: STUDENT IN AN ORGANIZED HEALTH CARE EDUCATION/TRAINING PROGRAM

## 2025-03-13 PROCEDURE — 97535 SELF CARE MNGMENT TRAINING: CPT | Mod: CO,GO

## 2025-03-13 PROCEDURE — 2500000001 HC RX 250 WO HCPCS SELF ADMINISTERED DRUGS (ALT 637 FOR MEDICARE OP): Performed by: NURSE PRACTITIONER

## 2025-03-13 PROCEDURE — 97116 GAIT TRAINING THERAPY: CPT | Mod: CQ,GP

## 2025-03-13 PROCEDURE — 82607 VITAMIN B-12: CPT | Mod: PARLAB | Performed by: NURSE PRACTITIONER

## 2025-03-13 RX ORDER — FLUOXETINE HYDROCHLORIDE 40 MG/1
40 CAPSULE ORAL DAILY
Start: 2025-03-14

## 2025-03-13 RX ORDER — HALOPERIDOL LACTATE 5 MG/ML
1 INJECTION, SOLUTION INTRAMUSCULAR EVERY 8 HOURS PRN
Status: DISCONTINUED | OUTPATIENT
Start: 2025-03-13 | End: 2025-03-14 | Stop reason: HOSPADM

## 2025-03-13 RX ORDER — LORAZEPAM 0.5 MG/1
1 TABLET ORAL ONCE
Status: COMPLETED | OUTPATIENT
Start: 2025-03-13 | End: 2025-03-13

## 2025-03-13 RX ORDER — FLUOXETINE HYDROCHLORIDE 20 MG/1
40 CAPSULE ORAL DAILY
Status: DISCONTINUED | OUTPATIENT
Start: 2025-03-14 | End: 2025-03-14 | Stop reason: HOSPADM

## 2025-03-13 RX ORDER — HALOPERIDOL LACTATE 5 MG/ML
2 INJECTION, SOLUTION INTRAMUSCULAR EVERY 8 HOURS PRN
Status: DISCONTINUED | OUTPATIENT
Start: 2025-03-13 | End: 2025-03-14 | Stop reason: HOSPADM

## 2025-03-13 RX ADMIN — DOCUSATE SODIUM 100 MG: 100 CAPSULE, LIQUID FILLED ORAL at 20:24

## 2025-03-13 RX ADMIN — DILTIAZEM HYDROCHLORIDE 240 MG: 240 CAPSULE, COATED, EXTENDED RELEASE ORAL at 11:26

## 2025-03-13 RX ADMIN — OLANZAPINE 7.5 MG: 5 TABLET, FILM COATED ORAL at 20:25

## 2025-03-13 RX ADMIN — TAMSULOSIN HYDROCHLORIDE 0.4 MG: 0.4 CAPSULE ORAL at 11:25

## 2025-03-13 RX ADMIN — OXYCODONE HYDROCHLORIDE 5 MG: 5 TABLET ORAL at 00:07

## 2025-03-13 RX ADMIN — OXYCODONE HYDROCHLORIDE 5 MG: 5 TABLET ORAL at 06:04

## 2025-03-13 RX ADMIN — PANTOPRAZOLE SODIUM 40 MG: 40 TABLET, DELAYED RELEASE ORAL at 06:04

## 2025-03-13 RX ADMIN — OXYCODONE HYDROCHLORIDE 5 MG: 5 TABLET ORAL at 20:24

## 2025-03-13 RX ADMIN — LORAZEPAM 1 MG: 0.5 TABLET ORAL at 13:16

## 2025-03-13 RX ADMIN — ASPIRIN 81 MG: 81 TABLET, COATED ORAL at 11:26

## 2025-03-13 RX ADMIN — LITHIUM CARBONATE 300 MG: 300 CAPSULE, GELATIN COATED ORAL at 20:24

## 2025-03-13 RX ADMIN — LITHIUM CARBONATE 300 MG: 300 CAPSULE, GELATIN COATED ORAL at 11:24

## 2025-03-13 RX ADMIN — FORMOTEROL FUMARATE DIHYDRATE 20 MCG: 20 SOLUTION RESPIRATORY (INHALATION) at 06:40

## 2025-03-13 RX ADMIN — FLUOXETINE HYDROCHLORIDE 60 MG: 20 CAPSULE ORAL at 11:26

## 2025-03-13 RX ADMIN — ASPIRIN 81 MG: 81 TABLET, COATED ORAL at 20:25

## 2025-03-13 RX ADMIN — TIOTROPIUM BROMIDE INHALATION SPRAY 2 PUFF: 3.12 SPRAY, METERED RESPIRATORY (INHALATION) at 06:38

## 2025-03-13 RX ADMIN — DOCUSATE SODIUM 100 MG: 100 CAPSULE, LIQUID FILLED ORAL at 11:27

## 2025-03-13 RX ADMIN — FORMOTEROL FUMARATE DIHYDRATE 20 MCG: 20 SOLUTION RESPIRATORY (INHALATION) at 19:56

## 2025-03-13 RX ADMIN — TRAZODONE HYDROCHLORIDE 100 MG: 50 TABLET ORAL at 20:25

## 2025-03-13 RX ADMIN — OXYCODONE HYDROCHLORIDE 5 MG: 5 TABLET ORAL at 12:22

## 2025-03-13 ASSESSMENT — COGNITIVE AND FUNCTIONAL STATUS - GENERAL
MOVING TO AND FROM BED TO CHAIR: A LITTLE
DAILY ACTIVITIY SCORE: 18
HELP NEEDED FOR BATHING: A LOT
TURNING FROM BACK TO SIDE WHILE IN FLAT BAD: A LITTLE
MOBILITY SCORE: 15
TOILETING: A LOT
DRESSING REGULAR LOWER BODY CLOTHING: A LOT
MOVING TO AND FROM BED TO CHAIR: A LOT
PERSONAL GROOMING: A LITTLE
DAILY ACTIVITIY SCORE: 16
DRESSING REGULAR UPPER BODY CLOTHING: A LITTLE
STANDING UP FROM CHAIR USING ARMS: A LOT
MOVING FROM LYING ON BACK TO SITTING ON SIDE OF FLAT BED WITH BEDRAILS: A LITTLE
STANDING UP FROM CHAIR USING ARMS: A LOT
MOVING FROM LYING ON BACK TO SITTING ON SIDE OF FLAT BED WITH BEDRAILS: A LITTLE
WALKING IN HOSPITAL ROOM: A LOT
PERSONAL GROOMING: A LITTLE
MOBILITY SCORE: 13
TOILETING: A LITTLE
HELP NEEDED FOR BATHING: A LITTLE
DRESSING REGULAR LOWER BODY CLOTHING: A LOT
CLIMB 3 TO 5 STEPS WITH RAILING: A LOT
DRESSING REGULAR UPPER BODY CLOTHING: A LITTLE
CLIMB 3 TO 5 STEPS WITH RAILING: TOTAL
WALKING IN HOSPITAL ROOM: A LOT
TURNING FROM BACK TO SIDE WHILE IN FLAT BAD: A LITTLE

## 2025-03-13 ASSESSMENT — PAIN DESCRIPTION - ORIENTATION
ORIENTATION: RIGHT

## 2025-03-13 ASSESSMENT — PAIN DESCRIPTION - LOCATION
LOCATION: KNEE

## 2025-03-13 ASSESSMENT — PAIN SCALES - GENERAL
PAINLEVEL_OUTOF10: 10 - WORST POSSIBLE PAIN
PAINLEVEL_OUTOF10: 0 - NO PAIN
PAINLEVEL_OUTOF10: 7
PAINLEVEL_OUTOF10: 10 - WORST POSSIBLE PAIN
PAINLEVEL_OUTOF10: 10 - WORST POSSIBLE PAIN
PAINLEVEL_OUTOF10: 7
PAINLEVEL_OUTOF10: 10 - WORST POSSIBLE PAIN

## 2025-03-13 ASSESSMENT — ACTIVITIES OF DAILY LIVING (ADL): HOME_MANAGEMENT_TIME_ENTRY: 14

## 2025-03-13 NOTE — NURSING NOTE
"Patient removed IV, refused to allow medical resident Abhishek Garibay place an ultrasound guided IV, Attending Geoffrey Urrutia notified via secure chat. Patient very agitated at this time. He called MD \"a elliott garber' ,RN attempted to educate patient on the purpose of the IV but patient refusing at this time.   "

## 2025-03-13 NOTE — PROGRESS NOTES
Occupational Therapy                 Therapy Communication Note    Patient Name: Trip Brown  MRN: [id number]  Department: [facility] 3  Room: 336/336-A  Today's Date: 3/13/2025       Case conference with NICK this am.  Updated/extended POC as patient would benefit from further OT to address goals/treatment plan.

## 2025-03-13 NOTE — PROGRESS NOTES
"Trip rBown is a 64 y.o. male on day 1 of admission presenting with Arthritis of right knee.    Subjective   Patient sitting on edge of bed with minimal c/o pain    Tolerating regular diet well        Objective     Physical Exam  Vitals reviewed.   Constitutional:       Comments: Patient has had episodes of combativeness with nursing staff and inappropriate over past 24 hours   Neurological:      Mental Status: He is alert.         Last Recorded Vitals  Blood pressure 142/85, pulse 79, temperature 36.7 °C (98.1 °F), temperature source Temporal, resp. rate 16, height 1.829 m (6' 0.01\"), weight 92 kg (202 lb 13.2 oz), SpO2 96%.  Intake/Output last 3 Shifts:  I/O last 3 completed shifts:  In: 999 (10.9 mL/kg) [IV Piggyback:999]  Out: 500 (5.4 mL/kg) [Urine:500 (0.2 mL/kg/hr)]  Weight: 92 kg     Relevant Results      Scheduled medications  acetaminophen, 975 mg, oral, q6h  aspirin, 81 mg, oral, BID  dilTIAZem CD, 240 mg, oral, Daily  docusate sodium, 100 mg, oral, BID  FLUoxetine, 60 mg, oral, Daily  tiotropium, 2 puff, inhalation, Daily   And  formoterol, 20 mcg, nebulization, q12h  lithium, 300 mg, oral, BID  OLANZapine, 7.5 mg, oral, Nightly  pantoprazole, 40 mg, oral, Daily before breakfast  polyethylene glycol, 17 g, oral, Daily  tamsulosin, 0.4 mg, oral, Daily  traZODone, 100 mg, oral, Nightly      Continuous medications  oxygen, 2 L/min      PRN medications  PRN medications: benzocaine-menthol, bisacodyl, cyclobenzaprine, fluticasone, naloxone, ondansetron ODT **OR** ondansetron, oxyCODONE  Results for orders placed or performed during the hospital encounter of 03/10/25 (from the past 24 hours)   Basic Metabolic Panel   Result Value Ref Range    Glucose 123 (H) 74 - 99 mg/dL    Sodium 137 136 - 145 mmol/L    Potassium 4.3 3.5 - 5.3 mmol/L    Chloride 108 (H) 98 - 107 mmol/L    Bicarbonate 19 (L) 21 - 32 mmol/L    Anion Gap 14 10 - 20 mmol/L    Urea Nitrogen 18 6 - 23 mg/dL    Creatinine 1.14 0.50 - 1.30 " "mg/dL    eGFR 71 >60 mL/min/1.73m*2    Calcium 9.1 8.6 - 10.3 mg/dL                            Assessment/Plan   Assessment & Plan  Arthritis of right knee    S/P TKR (total knee replacement) not using cement, right      Appreciate recommendations from cardiology as well as medical service.  Creatinine level normalized this am at 1.14     Continue therapy as patient is able to participate.  Encouraged patient to participate.  Await insurance approval for SNF placement.  Continue aspirin 81mg twice daily.  Increase patients' activity as tolerated.         ADDENDUM:  Spoke with patient's sister via telephone and updated her on treatment/discharge plan.  She will speak with patient and reiterate importance of participating in therapy.  She also stated patient's current mental status is \"not him\".      Consults written for both Neurology and Psychiatry to evaluate patient.    India Dickerson, YUE-CNS      "

## 2025-03-13 NOTE — CARE PLAN
The patient's goals for the shift include  Rest    The clinical goals for the shift include pain management      Problem: Pain - Adult  Goal: Verbalizes/displays adequate comfort level or baseline comfort level  Outcome: Progressing     Problem: Safety - Adult  Goal: Free from fall injury  Outcome: Progressing     Problem: Discharge Planning  Goal: Discharge to home or other facility with appropriate resources  Outcome: Progressing     Problem: Chronic Conditions and Co-morbidities  Goal: Patient's chronic conditions and co-morbidity symptoms are monitored and maintained or improved  Outcome: Progressing     Problem: Nutrition  Goal: Nutrient intake appropriate for maintaining nutritional needs  Outcome: Progressing

## 2025-03-13 NOTE — PROGRESS NOTES
Peer to Peer appeal completed and insurance has approved SNF placement for admission 3/13-3/17.  Await neuro & pysch consults prior to discharge.    Patient medically cleared for discharge from cardiology, psychiatry, neurology and hosptalist team.  Plan discharge to SNF tomorrow.  Spoke with patient's sister Peace via telephone to update her on discharge plan.

## 2025-03-13 NOTE — CONSULTS
"Psych Consult    Inpatient consult to Psychiatry  Consult performed by: YUE Gupta-CNP  Consult ordered by: YUE Daly-CNS          Reason for Consultation:  AMS    HISTORY OF PRESENT ILLNESS    Per Medicine - 63 yo M with PMHx of polysubstance abuse (cocaine, EtOH, opioids), bipolar disorder, RLS, hepatitis C, HTN, HLD, SVT s/p catheter ablation, asthma/COPD overlap, and memory impairment presents s/p elective R total knee replacement. He is now POD#2. This AM pt went into SVT for which a rapid response was called and hospitalist service consulted to help aid in post operative medical management. SVT aborted with IVF Bolus and one dose of IV lopressor 5mg IV. He was then given his usual long acting cardizem which he had not received for the past 2 days. He did complain of increased sob and given recent orthopedic surgery, tachycardia, and sob a CTA was performed which was grossly unremarkable.         HPI:  Pt is a 64 yr old CM being seen for AMS  PPHX cocaine abuse, opiate abuse, Bipolar Disorder   Currently Rx  Olanzapine 7.5 mg BID  Lithium 300 mg BID  Prozac 40 mg     Pt is AxO x2-3, some confusion noted  Initially states that he is not seeing a psychiatric provider and that he does not have any psychiatric HX  Several minutes into interview pt states that he has a Hx of Bipolar Disorder and is linked with Dr. Isreal Rosen at [facility].  Pt lives at a sober living home and has been sober for 5 years  Currently on disability  Last admission was \"a few months ago\" cannot recall details    7/26/2024 [facility]  Patient is a 63-year-old male presenting for psychiatric evaluation.  Has a history of bipolar disorder.  Presented to the ED yesterday evening for suicidal thoughts.  No active plan yesterday evening.  Workup by the previous provider and medically clear.  Mild elevated creatinine but has a history of previous elevated creatinine.  Does not appear to be intoxicated on any drugs or " "alcohol.  Case has been presented to the intake department.  They were recommending admission.  Currently no beds available at our facility.  Recommending being transferred to an outside facility.  [facility]  Patient informed of the acceptance.  Seems to be understanding.  Will start him back on his medications.  Does appear that he is on Cardizem, aspirin, Prozac, and lithium.  Also has some chronic knee discomfort.  Requesting his gabapentin but requesting it now as he missed his evening dose.  Otherwise no other acute outburst overnight.  Has been calm and cooperative with staff.  Will continue to monitor while he is boarding here in the ER.    Pt currently denies any SI  Denies any sleep disturbance, admits he has not been eating much due to not liking food in the hospital.    Per staff pt has frequently been agitated and difficult to direct, will not allow a PIV to be placed despite mult staff members attempting to explain the reason IV is needed.  Pt states \"I can't explain I just get real SOB and then I don't want to have it done\"  Pt agrees to allow IV to be placed if he were to get something that may help with anxiety prior to IV placement.    Spoke with sister Peace who is concerned about pt behavior since his surgery. \"He has not been himself at all. He is swearing and just flat out being a suly. He will not cooperate with staff. He has been paranoid and agitated. At times he couldn't even feed himself\"  Sister endorses that pt is not at his baseline due to behavior and increased confusion.   She is aware that pt has some cognitive deficits and has been helping manage his affairs and finances.      PSYCHIATRIC REVIEW OF SYSTEMS  SI: Denies    Depression: Denies    Renetta: Denies    Panic: Denies    Generalized Anxiety: Denies    PTSD: Denies    OCD: Denies    Psychosis: Denies    Eating Disorder: Denies    Current Outpatient Medications   Medication " Instructions    acetaminophen (TYLENOL) 975 mg, oral, Every 6 hours PRN    aspirin 81 mg, oral, 2 times daily, For 30 days    chlorhexidine (Peridex) 0.12 % solution 15 mL, Mouth/Throat, As needed, Swish in mouth and spit out    cholecalciferol (VITAMIN D-3) 25 mcg, oral, Daily RT    cyclobenzaprine (FLEXERIL) 10 mg, oral, 3 times daily PRN    dilTIAZem CD (CARDIZEM CD) 240 mg, oral, Daily RT    docusate sodium (COLACE) 100 mg, oral, 2 times daily    FLUoxetine (PROzac) 60 mg tablet 1 capsule, Daily RT    fluticasone (Flonase) 50 mcg/actuation nasal spray 1 spray, Daily RT    gabapentin (NEURONTIN) 300 mg, oral, 3 times daily    ipratropium-albuteroL (Duo-Neb) 0.5-2.5 mg/3 mL nebulizer solution 3 mL, inhalation, Every 6 hours PRN    lithium 300 mg, 2 times daily    nicotine (Nicoderm CQ) 21 mg/24 hr patch 1 patch, transdermal, Every 24 hours    nicotine polacrilex (COMMIT) 4 mg, Mouth/Throat, Every 2 hour PRN    OLANZapine (ZyPREXA) 7.5 mg tablet Take 1 Tablet by mouth nightly at bedtime; Indications manic-depression    oxyCODONE (ROXICODONE) 5-10 mg, oral, Every 6 hours PRN    pantoprazole (PROTONIX) 40 mg, oral, Daily before breakfast, For 21 days.  Do not crush, chew, or split.    polyethylene glycol (GLYCOLAX, MIRALAX) 17 g, oral, 2 times daily PRN    tamsulosin (FLOMAX) 0.4 mg, oral, Daily    tiotropium (SPIRIVA) 18 mcg, Daily RT    tiotropium-olodateroL (Stiolto Respimat) 2.5-2.5 mcg/actuation mist inhaler 2 Inhalations, inhalation, Daily    traZODone (DESYREL) 50 mg    Ventolin HFA 90 mcg/actuation inhaler Inhale 2 puffs every 6 hours if needed for wheezing or shortness of breath.        OARRS:   220    Past Medical History:   Diagnosis Date    Anxiety     Arthritis     Asthma     Bipolar disorder     CKD (chronic kidney disease)     COPD (chronic obstructive pulmonary disease) (Multi)     Hepatitis B carrier (Multi)     Hepatitis C     History of cocaine use     HL (hearing loss)     Hyperlipidemia      "Hypertension     Polysubstance abuse (Multi)     RLS (restless legs syndrome)     Seasonal allergies     SVT (supraventricular tachycardia) (CMS-HCC) 2023    s/p ablation    Thrombocytopenia (CMS-HCC)         Past Surgical History:   Procedure Laterality Date    BACK SURGERY      CARDIAC CATHETERIZATION      COLONOSCOPY      HAND SURGERY Left     LUMBAR SPINE SURGERY      TONSILLECTOMY          Family History   Adopted: Yes   Problem Relation Name Age of Onset    Stroke Father          Social History     Substance and Sexual Activity   Alcohol Use Not Currently    Comment: sober since         Social History     Substance and Sexual Activity   Drug Use Not Currently    Types: \"Crack\" cocaine, Cocaine, Heroin    Comment: last used         Social History     Tobacco Use   Smoking Status Former    Current packs/day: 0.00    Average packs/day: 0.5 packs/day for 40.0 years (20.0 ttl pk-yrs)    Types: Cigarettes    Start date: 1984    Quit date: 2024    Years since quittin.0   Smokeless Tobacco Never        MENTAL STATUS EXAM  Physical Exam  Psychiatric:         Attention and Perception: Attention and perception normal.         Mood and Affect: Affect is flat.         Speech: Speech normal.         Behavior: Behavior is cooperative.         Thought Content: Thought content normal.         Cognition and Memory: Cognition is impaired. Memory is impaired.         Judgment: Judgment normal.         Vitals:    25 0127 25 0533 25 0638 25 1022   BP: 144/74 142/85  153/87   BP Location: Left arm Left arm  Left arm   Patient Position: Lying Lying  Sitting   Pulse: 70 79  64   Resp: 18 16  15   Temp: 36.9 °C (98.4 °F) 36.7 °C (98.1 °F)  36.3 °C (97.3 °F)   TempSrc: Temporal Temporal  Temporal   SpO2: 94% 94% 96% 95%   Weight:       Height:            Recent Results (from the past 72 hours)   CBC    Collection Time: 25  6:48 AM   Result Value Ref Range    WBC 12.6 (H) 4.4 - 11.3 " x10*3/uL    nRBC 0.0 0.0 - 0.0 /100 WBCs    RBC 4.22 (L) 4.50 - 5.90 x10*6/uL    Hemoglobin 14.0 13.5 - 17.5 g/dL    Hematocrit 42.9 41.0 - 52.0 %     (H) 80 - 100 fL    MCH 33.2 26.0 - 34.0 pg    MCHC 32.6 32.0 - 36.0 g/dL    RDW 12.6 11.5 - 14.5 %    Platelets 155 150 - 450 x10*3/uL   Basic metabolic panel    Collection Time: 03/11/25  6:48 AM   Result Value Ref Range    Glucose 134 (H) 74 - 99 mg/dL    Sodium 137 136 - 145 mmol/L    Potassium 4.3 3.5 - 5.3 mmol/L    Chloride 106 98 - 107 mmol/L    Bicarbonate 23 21 - 32 mmol/L    Anion Gap 12 10 - 20 mmol/L    Urea Nitrogen 26 (H) 6 - 23 mg/dL    Creatinine 1.77 (H) 0.50 - 1.30 mg/dL    eGFR 42 (L) >60 mL/min/1.73m*2    Calcium 9.1 8.6 - 10.3 mg/dL   Basic metabolic panel    Collection Time: 03/11/25  2:41 PM   Result Value Ref Range    Glucose 132 (H) 74 - 99 mg/dL    Sodium 136 136 - 145 mmol/L    Potassium 4.2 3.5 - 5.3 mmol/L    Chloride 107 98 - 107 mmol/L    Bicarbonate 21 21 - 32 mmol/L    Anion Gap 12 10 - 20 mmol/L    Urea Nitrogen 25 (H) 6 - 23 mg/dL    Creatinine 1.62 (H) 0.50 - 1.30 mg/dL    eGFR 47 (L) >60 mL/min/1.73m*2    Calcium 8.9 8.6 - 10.3 mg/dL   Magnesium    Collection Time: 03/11/25  2:41 PM   Result Value Ref Range    Magnesium 1.62 1.60 - 2.40 mg/dL   Basic metabolic panel    Collection Time: 03/12/25  5:55 AM   Result Value Ref Range    Glucose 131 (H) 74 - 99 mg/dL    Sodium 135 (L) 136 - 145 mmol/L    Potassium 4.7 3.5 - 5.3 mmol/L    Chloride 108 (H) 98 - 107 mmol/L    Bicarbonate 16 (L) 21 - 32 mmol/L    Anion Gap 16 10 - 20 mmol/L    Urea Nitrogen 19 6 - 23 mg/dL    Creatinine 1.32 (H) 0.50 - 1.30 mg/dL    eGFR 60 (L) >60 mL/min/1.73m*2    Calcium 9.1 8.6 - 10.3 mg/dL   Lavender Top    Collection Time: 03/12/25  5:55 AM   Result Value Ref Range    Extra Tube Hold for add-ons.    Magnesium    Collection Time: 03/12/25  5:55 AM   Result Value Ref Range    Magnesium 1.77 1.60 - 2.40 mg/dL   POCT GLUCOSE    Collection Time:  03/12/25  7:43 AM   Result Value Ref Range    POCT Glucose 177 (H) 74 - 99 mg/dL   ECG 12 lead    Collection Time: 03/12/25  8:04 AM   Result Value Ref Range    Ventricular Rate 86 BPM    Atrial Rate 86 BPM    MD Interval 174 ms    QRS Duration 86 ms    QT Interval 354 ms    QTC Calculation(Bazett) 423 ms    P Axis 49 degrees    R Axis -2 degrees    T Axis 74 degrees    QRS Count 13 beats    Q Onset 225 ms    P Onset 138 ms    P Offset 191 ms    T Offset 402 ms    QTC Fredericia 399 ms   Basic Metabolic Panel    Collection Time: 03/13/25  5:15 AM   Result Value Ref Range    Glucose 123 (H) 74 - 99 mg/dL    Sodium 137 136 - 145 mmol/L    Potassium 4.3 3.5 - 5.3 mmol/L    Chloride 108 (H) 98 - 107 mmol/L    Bicarbonate 19 (L) 21 - 32 mmol/L    Anion Gap 14 10 - 20 mmol/L    Urea Nitrogen 18 6 - 23 mg/dL    Creatinine 1.14 0.50 - 1.30 mg/dL    eGFR 71 >60 mL/min/1.73m*2    Calcium 9.1 8.6 - 10.3 mg/dL        ECG 12 lead    Result Date: 3/13/2025  Sinus rhythm with frequent Premature ventricular complexes Otherwise normal ECG When compared with ECG of 12-MAR-2025 07:41, (unconfirmed) Fusion complexes are no longer Present Vent. rate has decreased BY  73 BPM ST no longer depressed in Anterior leads Nonspecific T wave abnormality no longer evident in Inferior leads T wave inversion no longer evident in Anterolateral leads    CT angio chest for pulmonary embolism    Result Date: 3/12/2025  Interpreted By:  Gaurav Keller, STUDY: CT ANGIO CHEST FOR PULMONARY EMBOLISM;  3/12/2025 9:34 am   INDICATION: Signs/Symptoms:PE?.     COMPARISON: None.   ACCESSION NUMBER(S): [id number]   ORDERING CLINICIAN: JAMES CAZARES   TECHNIQUE: Helical data acquisition of the chest was obtained following the intravenous administration of  75 ml of contrast/Omnipaque 350. Images were reformatted in axial, coronal, and sagittal planes. 3D post processing was performed on an independent workstation and volume rendered images of the pulmonary arteries  were created and utilized in the interpretation.   FINDINGS: POTENTIAL LIMITATIONS OF THE STUDY:   Respiratory motion artifact.   HEART AND VESSELS: No filling defects are identified within the pulmonary arteries to indicate the presence of a pulmonary embolism. Please note that some of the segmental and subsegmental pulmonary arterial branches could not be adequately evaluated secondary to respiratory motion artifact, especially through the lower lobes. Mild prominence of the central pulmonary arteries may indicate underlying pulmonary hypertension.   Mild atherosclerotic disease. Aorta is normal in course and caliber.   Heart appears mildly enlarged.   No pericardial effusion is seen.   MEDIASTINUM AND OLU, LOWER NECK AND AXILLA: The visualized thyroid gland is within normal limits.   No evidence of thoracic lymphadenopathy by CT criteria.   Esophagus appears within normal limits as seen.   LUNGS AND AIRWAYS: The trachea and central airways are patent. No endobronchial lesion.   Scattered areas of atelectasis/scarring in the lungs. This is most conspicuous in the lung bases. No definite consolidation. Trace bilateral pleural effusions. No evidence of a pneumothorax.   UPPER ABDOMEN: The visualized subdiaphragmatic structures demonstrate no acute abnormality. Subcentimeter hypoattenuating lesion in the right hepatic dome is too small to characterize but statistically most likely represents a cyst. There are numerous cysts within the visible portions of both kidneys. These measure up to 7.2 cm on the left.   CHEST WALL AND OSSEOUS STRUCTURES: Degenerative changes. No acute process.       No evidence of a pulmonary embolism. Some of the segmental and subsegmental pulmonary arterial branches could not be adequately evaluated secondary to respiratory motion artifact. Scarring/atelectasis in the lungs, predominantly at the lung bases. Trace effusions.   MACRO: None.   Signed by: Gaurav Keller 3/12/2025 9:42 AM Dictation  workstation:   [id number]      ASSESSMENT AND PLAN  DX:   Delirium  YASMINE  Opiate abuse  Cocaine abuse  Bipolar Disorder    PLAN:   Delirium - likely multifactorial due to YASMINE, pain medication, anesthesia and possibly sleep disturbance  YASMINE resolved   Haldol PRN for agitation  Please cluster care and allow pt to rest as much as possible overnight  Will order 1 mg Ativan PO to be given 20-30 min prior to PIV placement due to anxiety/unwilling to have PIV placed in past. Pt is agreeable with plan      Thank you for allowing us to participate in the care of this patient. We will continue to follow-up with you. .    I spent 90 minutes in the professional and overall care of this patient.      Wild Ortiz, APRN-CNP

## 2025-03-13 NOTE — PROGRESS NOTES
Occupational Therapy    OT Treatment    Patient Name: Trip Brown  MRN: [id number]  Department: [facility]  Room: [address] [place] [address]  Today's Date: 3/13/2025  Time Calculation  Start Time: 1025  Stop Time: 1039  Time Calculation (min): 14 min        Assessment:  End of Session Communication: Bedside nurse  End of Session Patient Position: Up in chair, Alarm on     Plan:  Treatment Interventions: ADL retraining, Functional transfer training, Patient/family training, Equipment evaluation/education, Endurance training, Compensatory technique education (TKR precautions training)  OT Frequency: BID (as needed)  OT Discharge Recommendations: Low intensity level of continued care  OT - OK to Discharge: Yes  Treatment Interventions: ADL retraining, Functional transfer training, Patient/family training, Equipment evaluation/education, Endurance training, Compensatory technique education (TKR precautions training)    Subjective   Previous Visit Info:  OT Last Visit  OT Received On: 03/13/25  General:  General  Co-Treatment: PT  Co-Treatment Reason: to maximize safety during mobility tasks  Prior to Session Communication: Bedside nurse, Care Coordinator  Patient Position Received: Up in chair, Alarm on  General Comment: on first attempt pt refusing to get OOB despite max encouragement, on second trial pt in agreement following max education and encourgement on importance of therapy  Precautions:  LE Weight Bearing Status: Weight Bearing as Tolerated  Medical Precautions: Fall precautions  Post-Surgical Precautions: Right total knee precautions        Pain:  Pain Assessment  Pain Assessment:  (pt did not c/o pain during session)    Objective         Activities of Daily Living: Grooming  Grooming Comments: set up to brush teeth at end of session per pt request seated in chair    LE Dressing  LE Dressing Comments: attempted to don compression stocking but pt declined  Functional Standing Tolerance:  Time: 2:30 standing at [facility]  Bed  Mobility/Transfers: Transfers  Transfer: Yes  Transfer 1  Technique 1: Sit to stand, Stand to sit  Transfer Device 1: Walker  Transfer Level of Assistance 1: Minimum assistance (x2)      Functional Mobility:  Functional Mobility  Functional Mobility Performed: Yes  Functional Mobility 1  Device 1: Rolling walker  Assistance 1: Minimum assistance (x2)  Comments 1: pt ambulated in room to door and back to chair with arms      Outcome Measures:[facility] Daily Activity  Putting on and taking off regular lower body clothing: A lot  Bathing (including washing, rinsing, drying): A lot  Putting on and taking off regular upper body clothing: A little  Toileting, which includes using toilet, bedpan or urinal: A lot  Taking care of personal grooming such as brushing teeth: A little  Eating Meals: None  Daily Activity - Total Score: 16        Education Documentation  Body Mechanics, taught by NICK Ray at 3/13/2025 10:47 AM.  Learner: Patient  Readiness: Acceptance  Method: Explanation  Response: Verbalizes Understanding, Needs Reinforcement    Precautions, taught by NICK Ray at 3/13/2025 10:47 AM.  Learner: Patient  Readiness: Acceptance  Method: Explanation  Response: Verbalizes Understanding, Needs Reinforcement    ADL Training, taught by NICK Ray at 3/13/2025 10:47 AM.  Learner: Patient  Readiness: Acceptance  Method: Explanation  Response: Verbalizes Understanding, Needs Reinforcement    Education Comments  No comments found.             Goals:  Encounter Problems       Encounter Problems (Active)       OT Goals       Patient will complete lower body bathing/dressing; toileting with minimal assist using assistive techniques/adaptive equipment as needed  (Progressing)       Start:  03/10/25    Expected End:  03/20/25            Patient will perform functional transfers with supervision:  bed, chair, commode using DME as needed and simulated car transfer with minimal assist  (Progressing)        Start:  03/10/25    Expected End:  03/20/25            Patient will adhere to TKR precautions to ADL's and functional transfers/mobility  (Progressing)       Start:  03/10/25    Expected End:  03/20/25            Patient will tolerate standing for 5 mins. and show overall good (-) standing balance during ADL's and functional transfers/mobility  (Progressing)       Start:  03/10/25    Expected End:  03/20/25

## 2025-03-13 NOTE — PROGRESS NOTES
Physical Therapy    Physical Therapy Treatment    Patient Name: Trip Brown  MRN: [id number]  Department: [facility]  Room: [address] [place] [address]  Today's Date: 3/13/2025  Time Calculation  Start Time: 1014  Stop Time: 1044  Time Calculation (min): 30 min         Assessment/Plan         PT Plan  Treatment/Interventions: Bed mobility, Gait training, Transfer training, Balance training, Strengthening, Endurance training, Range of motion, Therapeutic exercise, Therapeutic activity, Home exercise program, Positioning  PT Plan: Ongoing PT  PT Frequency: BID  PT Discharge Recommendations: Moderate intensity level of continued care  PT - OK to Discharge: Yes (once medically cleared for next level of care)      General Visit Information:   PT  Visit  PT Received On: 03/13/25  General  PT Missed Visit:  (attempted to treat patient for r knee rom ex. patient was too agitated. rn aware.)    Subjective                  Objective   Pain: r knee -no rating                        Treatments:        Patient was sitting up in chair.    Ambulation/Gait Training  Ambulation/Gait Training Performed:  (ambulated 20 feet using fixed wheeled walker min a x 2    decreased stride length  slow antalgic pace)  Transfers  Transfer:  (sit to stand min a x 2)         Outcome Measures:  [facility] Basic Mobility  Turning from your back to your side while in a flat bed without using bedrails: A little  Moving from lying on your back to sitting on the side of a flat bed without using bedrails: A little  Moving to and from bed to chair (including a wheelchair): A lot  Standing up from a chair using your arms (e.g. wheelchair or bedside chair): A lot  To walk in hospital room: A lot  Climbing 3-5 steps with railing: Total  Basic Mobility - Total Score: 13    Education Documentation  Home Exercise Program, taught by Jeniffer Boggs PTA at 3/13/2025 10:46 AM.  Learner: Patient  Readiness: Acceptance  Method: Demonstration  Response: Demonstrated  Understanding    Handouts, taught by Jeniffer Boggs PTA at 3/13/2025 10:46 AM.  Learner: Patient  Readiness: Acceptance  Method: Demonstration  Response: Demonstrated Understanding    Precautions, taught by Jeniffer Boggs PTA at 3/13/2025 10:46 AM.  Learner: Patient  Readiness: Acceptance  Method: Demonstration  Response: Demonstrated Understanding    Body Mechanics, taught by Jeniffer Boggs PTA at 3/13/2025 10:46 AM.  Learner: Patient  Readiness: Acceptance  Method: Demonstration  Response: Demonstrated Understanding    Mobility Training, taught by Jeniffer Boggs PTA at 3/13/2025 10:46 AM.  Learner: Patient  Readiness: Acceptance  Method: Demonstration  Response: Demonstrated Understanding    Education Comments  No comments found.             Encounter Problems       Encounter Problems (Active)       PT Problem       PT Goal 1 STG - Pt will transition supine <> sitting with MOD I  (Progressing)       Start:  03/10/25    Expected End:  03/19/25            PT Goal 2 STG - Pt will perform a B LE ther ex program of 2-3 sets of 10  (Progressing)       Start:  03/10/25    Expected End:  03/19/25            PT Goal 3 STG - Pt will transfer STS with MOD I  (Not Progressing)       Start:  03/10/25    Expected End:  03/19/25            PT Goal 4 STG - Pt will amb 75' using FWW with MOD I  (Not Progressing)       Start:  03/10/25    Expected End:  03/19/25            PT Goal 5 STG -  Pt will navigate 10 stairs using HR with supervision  (Not Progressing)       Start:  03/10/25    Expected End:  03/19/25               Pain - Adult

## 2025-03-13 NOTE — PROGRESS NOTES
"     The aide asked if I could help him put the patient back in bed. Patient is at the edge of the bed falling asleep. Asked the patient is we could assist him back into bed to lay down, but patient refused. Suggested to make the bed into a chair for the patient so he won't fall, but patient refused. Tried to put on the bed alarm, but patient is at the bottom of the bed. Asked the patient to stand up and move up towards the head of the bed, but patient refused. Patient called us \"Retarded\" for trying to compromise with him. Notifying the team. Will try again later.  "

## 2025-03-13 NOTE — PROGRESS NOTES
Trip Brown is a 64 y.o. male on day 1 of admission presenting with Arthritis of right knee.      Subjective   Doing well this AM, no complaints     Objective     Last Recorded Vitals  /78   Pulse 83   Temp 36.2 °C (97.2 °F)   Resp 16   Wt 92 kg (202 lb 13.2 oz)   SpO2 94%     Physical Exam    G: aox3, NAD, cooperative  HENT: neck supple, no JVD  Eyes: clear sclera  CV: RRR s1 s2  L: clear  Abd: soft, NT, non distended  Ext: no c/c/e, R knee dressing  N: no appreciable acute focal deficits  Psych: appropriate mood and behavior    Assessment/Plan      SVT, h/o SVT s/p catheter ablation  R elective total knee replacement, POD#3     Polysubstance abuse (cocaine, EtOH, opioids)  Bipolar disorder  RLS  Hepatitis C  HTN, HLD  CKD III  Asthma/COPD overlap  Memory impairment   DVT ppx  Full code     Plan:  - Reviewed tele, no significant events since yesterday, continue home cardizem, seen by EP  - Stopped scheduled albuterol, this can contribute to tachycardia, lungs clear and on RA, conitnue spiriva/performist  - Continue home psych meds  - Pain control, DVT ppx, PT/OT per primary orthopedic service  - Has had cognitive/memory issues for sometime now, primary service has consulted both psychiatry and neurology, he already has outpatient neuro eval, from my standpoint patient is ok for discharge     Kalyan Loja DO

## 2025-03-13 NOTE — CARE PLAN
Problem: Pain - Adult  Goal: Verbalizes/displays adequate comfort level or baseline comfort level  Outcome: Progressing     Problem: Safety - Adult  Goal: Free from fall injury  Outcome: Progressing     Problem: Chronic Conditions and Co-morbidities  Goal: Patient's chronic conditions and co-morbidity symptoms are monitored and maintained or improved  Outcome: Progressing     Problem: Nutrition  Goal: Nutrient intake appropriate for maintaining nutritional needs  Outcome: Progressing     Problem: Fall/Injury  Goal: Not fall by end of shift  Outcome: Progressing  Goal: Be free from injury by end of the shift  Outcome: Progressing  Goal: Use assistive devices by end of the shift  Outcome: Progressing     Problem: Skin  Goal: Decreased wound size/increased tissue granulation at next dressing change  Outcome: Progressing  Goal: Promote skin healing  Outcome: Progressing     Problem: Discharge Planning  Goal: Discharge to home or other facility with appropriate resources  Outcome: Progressing

## 2025-03-13 NOTE — CONSULTS
Inpatient consult to Neurology  Consult performed by: YUE Pugh-CNP  Consult ordered by: YUE Daly-CNS        History Of Present Illness  Trip Brown is a 64 y.o. male presenting with  PMHx of polysubstance abuse (cocaine, EtOH, opioids), bipolar disorder, RLS, hepatitis C, HTN, HLD, SVT s/p catheter ablation, asthma/COPD overlap, and memory impairment (already has an appointment scheduled with Dr. Geoffrey Chau for 8/12/2025) presents to [facility] for s/p elective R total knee replacement. He is now POD#2. This AM pt went into SVT for which a rapid response was called and hospitalist service consulted to help aid in post operative medical management. SVT aborted with IVF Bolus and one dose of IV lopressor 5mg IV. He was then given his usual long acting cardizem which he had not received for the past 2 days. He did complain of increased sob and given recent orthopedic surgery, tachycardia, and sob a CTA was performed which was grossly unremarkable.  After resolution of cardiac dysfunction, patient was confused and agitated, refusing placement of peripheral IV.  He is no longer agitated but still appears to be confused.  Both psychiatry and neurology were consulted for these findings.    Conversation with patients sister via telephone states that the patient has been suffering from some memory impairment for quite some time, to which she has been managing all of his finances/daily affairs. He actually has been going to [facility] in [place], [place] for said cognitive issues and was even in the process of him completing neuro-cognitive testing but became unhappy in how they were handling his care and decided to switch everything to . This is why he currently has an appointment scheduled with Dr. Chau.     10 point review of systems was completed and negative otherwise noted above in HPI.    Past Medical History  Past Medical History:   Diagnosis Date    Anxiety   "   Arthritis     Asthma     Bipolar disorder     CKD (chronic kidney disease)     COPD (chronic obstructive pulmonary disease) (Multi)     Hepatitis B carrier (Multi)     Hepatitis C     History of cocaine use     HL (hearing loss)     Hyperlipidemia     Hypertension     Polysubstance abuse (Multi)     RLS (restless legs syndrome)     Seasonal allergies     SVT (supraventricular tachycardia) (CMS-HCC) 2023    s/p ablation    Thrombocytopenia (CMS-HCC)      Family History   Adopted: Yes   Problem Relation Name Age of Onset    Stroke Father         Surgical History  Past Surgical History:   Procedure Laterality Date    BACK SURGERY      CARDIAC CATHETERIZATION      COLONOSCOPY      HAND SURGERY Left     LUMBAR SPINE SURGERY      TONSILLECTOMY       Social History  Social History     Tobacco Use    Smoking status: Former     Current packs/day: 0.00     Average packs/day: 0.5 packs/day for 40.0 years (20.0 ttl pk-yrs)     Types: Cigarettes     Start date: 1984     Quit date: 2024     Years since quittin.0    Smokeless tobacco: Never   Vaping Use    Vaping status: Never Used   Substance Use Topics    Alcohol use: Not Currently     Comment: sober since     Drug use: Not Currently     Types: \"Crack\" cocaine, Cocaine, Heroin     Comment: last used      Allergies  Bupropion  Medications Prior to Admission   Medication Sig Dispense Refill Last Dose/Taking    FLUoxetine (PROzac) 60 mg tablet Take 1 capsule by mouth once daily.   3/10/2025 Morning    fluticasone (Flonase) 50 mcg/actuation nasal spray Administer 1 spray into affected nostril(s) once daily.   3/9/2025    gabapentin (Neurontin) 300 mg capsule TAKE 1 CAPSULE (300 MG) BY MOUTH 3 TIMES A DAY. 90 capsule 2 3/10/2025 Morning    lithium 300 mg capsule Take 1 capsule (300 mg) by mouth twice a day.   3/10/2025 Morning    OLANZapine (ZyPREXA) 7.5 mg tablet Take 1 Tablet by mouth nightly at bedtime; Indications manic-depression   3/10/2025 Morning "    tamsulosin (Flomax) 0.4 mg 24 hr capsule TAKE 1 CAPSULE (0.4 MG) BY MOUTH ONCE DAILY. 30 capsule 11 Unknown    tiotropium (Spiriva) 18 mcg inhalation capsule Place 1 capsule (18 mcg) into inhaler and inhale once daily.   Past Month    tiotropium-olodateroL (Stiolto Respimat) 2.5-2.5 mcg/actuation mist inhaler Inhale 2 Inhalations once daily. 1 g 3 3/9/2025    traZODone (Desyrel) 100 mg tablet Take 0.5 tablets (50 mg) by mouth.   3/9/2025    Ventolin HFA 90 mcg/actuation inhaler Inhale 2 puffs every 6 hours if needed for wheezing or shortness of breath.   3/9/2025    [] chlorhexidine (Hibiclens) 4 % external liquid Apply topically once daily for 5 days. Begin using CHG for a total of 5 days before surgery Day 5 is the day of surgery See directions from the hospital 118 mL 0     chlorhexidine (Peridex) 0.12 % solution Use 15 mL in the mouth or throat if needed for wound care (oral rinse the night before surgery and the morning on the day of surgery) for up to 2 doses. Swish in mouth and spit out 120 mL 0     cholecalciferol (Vitamin D-3) 25 MCG (1000 UT) tablet Take 1 tablet (25 mcg) by mouth once daily.       cyclobenzaprine (Flexeril) 10 mg tablet Take 1 tablet (10 mg) by mouth 3 times a day as needed for muscle spasms for up to 10 days. 30 tablet 0     dilTIAZem CD (Cardizem CD) 240 mg 24 hr capsule Take 1 capsule (240 mg) by mouth once daily.       ipratropium-albuteroL (Duo-Neb) 0.5-2.5 mg/3 mL nebulizer solution Inhale 3 mL every 6 hours if needed.       nicotine (Nicoderm CQ) 21 mg/24 hr patch Place 1 patch over 24 hours on the skin once every 24 hours. 30 patch 0     nicotine polacrilex (Commit) 4 mg lozenge Dissolve 1 lozenge (4 mg) in the mouth every 2 hours if needed for smoking cessation. 100 lozenge 0      Physical exam/neurological exam  Patient seen and examined at this time; upon entering room he is awake and resting quietly in bed side chair. Appears fully developed and well nourished.  It  is noted the patient did recently receive as needed Ativan.  Mental status: A&Ox2. Memory testing, fund of knowledge and concentration appears intact as patient is able to provide writer of this note detailed information about his psychiatric care and who assist him with managing his day to day life (further conversation with patient's sister does note that she is actually the one that manages all of his affairs). Speech is mildly slurred but overall fluent and negative for any paraphrasic errors.     Cranial Nerves:  Optic II/ Oculomotor III: Fundoscopic exam was technically difficult. PERRL +2. Visual fields are full. Convergence and accomodation noted without difficulty. Negative for deficits to visual acuity confrontation via static-finger wiggle test. Eyes appear aligned and free of exophthalmos and ptosis. Sclera are white bilaterally and lens are free from clouding.   Oculomotor III/ Trochlear IV/ Abducens VI: Extraocular movements are full, with no evidence of nystagmus. Negative for diplopia.   Trigeminal V: Facial sensation is intact to light touch. Corneal reflex responsive when threatened bilaterally.  Facial VII: intact; nose is midline, with no evidence of flattening to nasolabial folds noted and mouth is negative for evidence of droop. Patient is successfully able to follow commands to raise eyebrows, squeeze eyes shut, smile and show teeth, frown, and puff out cheeks.   Acoustic VIII: Hearing is intact bilaterally.  Glossopharngyeal IX/ Vagus X: Palate elevates symmetrically to phonation. Findings are negative for uvula deviation or dysphagia.   Spinal accessory XI: Sternocleidomastoid/ upper trapezius is 5/5 to strength testing. No asymmetry noted to strength, bulk, or tone.   Hypoglossal XII: Tongue is midline and without deviations. Phonation is articulate and is negative for findings of dysarthria or aphasia.     Motor exam: negative for evidence of involuntary movements or fasiculations. RICHA  "flexion of biceps and brachioradialis graded 5/5, in addition to extension of triceps at elbow and wrists. BUE  strength 5/5, along with finger abduction and thumb opposition. BLE hip flexion, extension, adduction, and abduction mildly asymmetric 2/2 right knee arthroscopy, with RLE 4-/5 versus LLE 5/5. Knee extension & flexion LLE 5/5 with right knee untested given above findings. Ankle dorsiflexion and plantarflexion 5/5. Normal bulk and normal tone.     Sensory exam: Sensation is intact to light touch throughout.    Reflexes: Reflexes are 1+ and symmetric. Bilateral plantar responses are flexor.    Coordination: finger-to-nose testing is negative for signs of dysmetria. Pronator drift testing to BUE negative. Rapid alternating hand movements WNL.    Gait exam: Not tested as patient is a high fall risk    Last Recorded Vitals  Blood pressure 153/87, pulse 64, temperature 36.3 °C (97.3 °F), temperature source Temporal, resp. rate 15, height 1.829 m (6' 0.01\"), weight 92 kg (202 lb 13.2 oz), SpO2 95%.    Relevant Results  Scheduled medications  acetaminophen, 975 mg, oral, q6h  aspirin, 81 mg, oral, BID  dilTIAZem CD, 240 mg, oral, Daily  docusate sodium, 100 mg, oral, BID  FLUoxetine, 60 mg, oral, Daily  tiotropium, 2 puff, inhalation, Daily   And  formoterol, 20 mcg, nebulization, q12h  lithium, 300 mg, oral, BID  OLANZapine, 7.5 mg, oral, Nightly  pantoprazole, 40 mg, oral, Daily before breakfast  polyethylene glycol, 17 g, oral, Daily  tamsulosin, 0.4 mg, oral, Daily  traZODone, 100 mg, oral, Nightly      Continuous medications  oxygen, 2 L/min      PRN medications  PRN medications: benzocaine-menthol, bisacodyl, cyclobenzaprine, fluticasone, naloxone, ondansetron ODT **OR** ondansetron, oxyCODONE  ECG 12 lead    Result Date: 3/13/2025  Sinus rhythm with frequent Premature ventricular complexes Otherwise normal ECG When compared with ECG of 12-MAR-2025 07:41, (unconfirmed) Fusion complexes are no longer " Present Vent. rate has decreased BY  73 BPM ST no longer depressed in Anterior leads Nonspecific T wave abnormality no longer evident in Inferior leads T wave inversion no longer evident in Anterolateral leads    CT angio chest for pulmonary embolism    Result Date: 3/12/2025  Interpreted By:  Gaurav Keller, STUDY: CT ANGIO CHEST FOR PULMONARY EMBOLISM;  3/12/2025 9:34 am   INDICATION: Signs/Symptoms:PE?.     COMPARISON: None.   ACCESSION NUMBER(S): [id number]   ORDERING CLINICIAN: JAMES CAZARES   TECHNIQUE: Helical data acquisition of the chest was obtained following the intravenous administration of  75 ml of contrast/Omnipaque 350. Images were reformatted in axial, coronal, and sagittal planes. 3D post processing was performed on an independent workstation and volume rendered images of the pulmonary arteries were created and utilized in the interpretation.   FINDINGS: POTENTIAL LIMITATIONS OF THE STUDY:   Respiratory motion artifact.   HEART AND VESSELS: No filling defects are identified within the pulmonary arteries to indicate the presence of a pulmonary embolism. Please note that some of the segmental and subsegmental pulmonary arterial branches could not be adequately evaluated secondary to respiratory motion artifact, especially through the lower lobes. Mild prominence of the central pulmonary arteries may indicate underlying pulmonary hypertension.   Mild atherosclerotic disease. Aorta is normal in course and caliber.   Heart appears mildly enlarged.   No pericardial effusion is seen.   MEDIASTINUM AND OLU, LOWER NECK AND AXILLA: The visualized thyroid gland is within normal limits.   No evidence of thoracic lymphadenopathy by CT criteria.   Esophagus appears within normal limits as seen.   LUNGS AND AIRWAYS: The trachea and central airways are patent. No endobronchial lesion.   Scattered areas of atelectasis/scarring in the lungs. This is most conspicuous in the lung bases. No definite consolidation. Trace  bilateral pleural effusions. No evidence of a pneumothorax.   UPPER ABDOMEN: The visualized subdiaphragmatic structures demonstrate no acute abnormality. Subcentimeter hypoattenuating lesion in the right hepatic dome is too small to characterize but statistically most likely represents a cyst. There are numerous cysts within the visible portions of both kidneys. These measure up to 7.2 cm on the left.   CHEST WALL AND OSSEOUS STRUCTURES: Degenerative changes. No acute process.       No evidence of a pulmonary embolism. Some of the segmental and subsegmental pulmonary arterial branches could not be adequately evaluated secondary to respiratory motion artifact. Scarring/atelectasis in the lungs, predominantly at the lung bases. Trace effusions.   MACRO: None.   Signed by: Gaurav Keller 3/12/2025 9:42 AM Dictation workstation:   [id number]    XR knee right 1-2 views    Result Date: 3/10/2025  Interpreted By:  Antwon Doshi, STUDY: XR KNEE RIGHT 1-2 VIEWS; ;  3/10/2025 11:18 am   INDICATION: Signs/Symptoms:s/pTKA.   COMPARISON: 01/23/2025.   ACCESSION NUMBER(S): [id number]   ORDERING CLINICIAN: JAMES CAZARES   FINDINGS: There is interval placement of cemented total right knee arthroplasty. The hardware is intact. No acute fracture is seen. There is no lucency around the hardware to suggest loosening. The alignment is anatomic. Postsurgical gas overlies the soft tissues.       Intact total right knee arthroplasty without acute fracture.   Signed by: Antwon Doshi 3/10/2025 12:13 PM Dictation workstation:   [id number]   Results for orders placed or performed during the hospital encounter of 03/10/25 (from the past 24 hours)   Basic Metabolic Panel   Result Value Ref Range    Glucose 123 (H) 74 - 99 mg/dL    Sodium 137 136 - 145 mmol/L    Potassium 4.3 3.5 - 5.3 mmol/L    Chloride 108 (H) 98 - 107 mmol/L    Bicarbonate 19 (L) 21 - 32 mmol/L    Anion Gap 14 10 - 20 mmol/L    Urea Nitrogen 18 6 - 23 mg/dL    Creatinine  1.14 0.50 - 1.30 mg/dL    eGFR 71 >60 mL/min/1.73m*2    Calcium 9.1 8.6 - 10.3 mg/dL                                           I have personally reviewed the following imaging results ECG 12 lead    Result Date: 3/13/2025  Sinus rhythm with frequent Premature ventricular complexes Otherwise normal ECG When compared with ECG of 12-MAR-2025 07:41, (unconfirmed) Fusion complexes are no longer Present Vent. rate has decreased BY  73 BPM ST no longer depressed in Anterior leads Nonspecific T wave abnormality no longer evident in Inferior leads T wave inversion no longer evident in Anterolateral leads    CT angio chest for pulmonary embolism    Result Date: 3/12/2025  Interpreted By:  Gaurav Keller, STUDY: CT ANGIO CHEST FOR PULMONARY EMBOLISM;  3/12/2025 9:34 am   INDICATION: Signs/Symptoms:PE?.     COMPARISON: None.   ACCESSION NUMBER(S): [id number]   ORDERING CLINICIAN: JAMES CAZARES   TECHNIQUE: Helical data acquisition of the chest was obtained following the intravenous administration of  75 ml of contrast/Omnipaque 350. Images were reformatted in axial, coronal, and sagittal planes. 3D post processing was performed on an independent workstation and volume rendered images of the pulmonary arteries were created and utilized in the interpretation.   FINDINGS: POTENTIAL LIMITATIONS OF THE STUDY:   Respiratory motion artifact.   HEART AND VESSELS: No filling defects are identified within the pulmonary arteries to indicate the presence of a pulmonary embolism. Please note that some of the segmental and subsegmental pulmonary arterial branches could not be adequately evaluated secondary to respiratory motion artifact, especially through the lower lobes. Mild prominence of the central pulmonary arteries may indicate underlying pulmonary hypertension.   Mild atherosclerotic disease. Aorta is normal in course and caliber.   Heart appears mildly enlarged.   No pericardial effusion is seen.   MEDIASTINUM AND OLU, LOWER NECK AND  AXILLA: The visualized thyroid gland is within normal limits.   No evidence of thoracic lymphadenopathy by CT criteria.   Esophagus appears within normal limits as seen.   LUNGS AND AIRWAYS: The trachea and central airways are patent. No endobronchial lesion.   Scattered areas of atelectasis/scarring in the lungs. This is most conspicuous in the lung bases. No definite consolidation. Trace bilateral pleural effusions. No evidence of a pneumothorax.   UPPER ABDOMEN: The visualized subdiaphragmatic structures demonstrate no acute abnormality. Subcentimeter hypoattenuating lesion in the right hepatic dome is too small to characterize but statistically most likely represents a cyst. There are numerous cysts within the visible portions of both kidneys. These measure up to 7.2 cm on the left.   CHEST WALL AND OSSEOUS STRUCTURES: Degenerative changes. No acute process.       No evidence of a pulmonary embolism. Some of the segmental and subsegmental pulmonary arterial branches could not be adequately evaluated secondary to respiratory motion artifact. Scarring/atelectasis in the lungs, predominantly at the lung bases. Trace effusions.   MACRO: None.   Signed by: Gaurav Keller 3/12/2025 9:42 AM Dictation workstation:   [id number]    XR knee right 1-2 views    Result Date: 3/10/2025  Interpreted By:  Antwon Doshi, STUDY: XR KNEE RIGHT 1-2 VIEWS; ;  3/10/2025 11:18 am   INDICATION: Signs/Symptoms:s/pTKA.   COMPARISON: 01/23/2025.   ACCESSION NUMBER(S): [id number]   ORDERING CLINICIAN: JAMES CAZARES   FINDINGS: There is interval placement of cemented total right knee arthroplasty. The hardware is intact. No acute fracture is seen. There is no lucency around the hardware to suggest loosening. The alignment is anatomic. Postsurgical gas overlies the soft tissues.       Intact total right knee arthroplasty without acute fracture.   Signed by: Antwon Doshi 3/10/2025 12:13 PM Dictation workstation:   [id number]        Assessment/Plan   Assessment & Plan  Arthritis of right knee    S/P TKR (total knee replacement) not using cement, right    Acute confusion    Patient is a 64-year-old male who presented to [facility] to undergo right knee arthroscopy.  Surgical procedure was successful, but on postop day #2 patient was observed in SVT to which a rapid response was called.  He was provided with IV fluids and 1 dose of Lopressor 5 mg IV.  Patient had not received his long-acting Cardizem for the past 2 days, so this medication was provided as well.  After successful treatment of cardiac dysfunction patient was observed to be confused and agitated.  He refused to have IV placed.  Over the past 24 hours patient has improved in terms of agitation but per his sister his confusion is not at his neurologic baseline.  Likely cause for this issue is hospital induced delirium, secondary to poor sleep, receiving general anesthesia, being in a state of YASMINE, and having underlying neurocognitive impairment.     -No need for any neurologic testing at this time.  -Check B12, folate, MMA, and syphilis for reversible causes of confusion.  Continue to evaluate for any metabolic abnormalities or sources of infection that could be attributing.  Currently labs are all unremarkable.  -Psychiatry consulting; they do agree with altered mental status likely being secondary to hospital induced delirium.  They plan to add as needed psychiatric medications to assist patient with agitation if it recurs and highly recommends for patient to receive a full night sleep.  -Recommendations for needs during hospitalization and at discharge via PT, OT status post knee replacement  -Continue promotion of lifestyle modifications, such as: Strict BP and glycemic control, dietary habit changes, incorporation of daily exercise regimen, adherence to all prescription/OTC medication schedules, attendance to all follow-up appointments, cessation from smoking if applicable,  abstinence from alcohol and illicit drug use if applicable  -Patient to follow-up with orthopedic surgery as recommended  -Writer of this note has spoken with patient's sister regarding both neurologic and psychiatric plan of care.  It has been recommended for her to cancel appointment with Dr. Geoffrey Chau and instead schedule an appointment with Dr. Pankaj Hamlin-neuropsychiatry, for patient to continue neurocognitive testing.     Neurology to sign off at this time. Thank you for the opportunity to be a part of this patient's multidisciplinary treatment team.  If any additional questions or concerns arise, please do not hesitate to contact me or the on-call neurologist via [employer] Secure Chat.       I spent 80 minutes in the professional and overall care of this patient.      Ligia Rodríguez, YUE-CNP

## 2025-03-13 NOTE — ASSESSMENT & PLAN NOTE
Appreciate recommendations from cardiology as well as medical service.  Creatinine level normalized this am at 1.14

## 2025-03-14 VITALS
RESPIRATION RATE: 16 BRPM | BODY MASS INDEX: 27.47 KG/M2 | WEIGHT: 202.82 LBS | TEMPERATURE: 96.3 F | DIASTOLIC BLOOD PRESSURE: 93 MMHG | OXYGEN SATURATION: 97 % | SYSTOLIC BLOOD PRESSURE: 155 MMHG | HEART RATE: 72 BPM | HEIGHT: 72 IN

## 2025-03-14 PROCEDURE — 2500000001 HC RX 250 WO HCPCS SELF ADMINISTERED DRUGS (ALT 637 FOR MEDICARE OP): Performed by: STUDENT IN AN ORGANIZED HEALTH CARE EDUCATION/TRAINING PROGRAM

## 2025-03-14 PROCEDURE — 2500000002 HC RX 250 W HCPCS SELF ADMINISTERED DRUGS (ALT 637 FOR MEDICARE OP, ALT 636 FOR OP/ED): Performed by: STUDENT IN AN ORGANIZED HEALTH CARE EDUCATION/TRAINING PROGRAM

## 2025-03-14 PROCEDURE — 94640 AIRWAY INHALATION TREATMENT: CPT

## 2025-03-14 PROCEDURE — 2500000001 HC RX 250 WO HCPCS SELF ADMINISTERED DRUGS (ALT 637 FOR MEDICARE OP): Performed by: CLINICAL NURSE SPECIALIST

## 2025-03-14 PROCEDURE — 99232 SBSQ HOSP IP/OBS MODERATE 35: CPT | Performed by: STUDENT IN AN ORGANIZED HEALTH CARE EDUCATION/TRAINING PROGRAM

## 2025-03-14 PROCEDURE — 2500000001 HC RX 250 WO HCPCS SELF ADMINISTERED DRUGS (ALT 637 FOR MEDICARE OP): Performed by: NURSE PRACTITIONER

## 2025-03-14 PROCEDURE — 99222 1ST HOSP IP/OBS MODERATE 55: CPT | Performed by: NURSE PRACTITIONER

## 2025-03-14 PROCEDURE — 2500000001 HC RX 250 WO HCPCS SELF ADMINISTERED DRUGS (ALT 637 FOR MEDICARE OP)

## 2025-03-14 RX ADMIN — TAMSULOSIN HYDROCHLORIDE 0.4 MG: 0.4 CAPSULE ORAL at 08:45

## 2025-03-14 RX ADMIN — ACETAMINOPHEN 975 MG: 325 TABLET, FILM COATED ORAL at 12:39

## 2025-03-14 RX ADMIN — FORMOTEROL FUMARATE DIHYDRATE 20 MCG: 20 SOLUTION RESPIRATORY (INHALATION) at 06:54

## 2025-03-14 RX ADMIN — FLUOXETINE HYDROCHLORIDE 40 MG: 20 CAPSULE ORAL at 08:45

## 2025-03-14 RX ADMIN — OXYCODONE HYDROCHLORIDE 5 MG: 5 TABLET ORAL at 05:49

## 2025-03-14 RX ADMIN — PANTOPRAZOLE SODIUM 40 MG: 40 TABLET, DELAYED RELEASE ORAL at 05:50

## 2025-03-14 RX ADMIN — DILTIAZEM HYDROCHLORIDE 240 MG: 240 CAPSULE, COATED, EXTENDED RELEASE ORAL at 08:46

## 2025-03-14 RX ADMIN — LITHIUM CARBONATE 300 MG: 300 CAPSULE, GELATIN COATED ORAL at 08:45

## 2025-03-14 RX ADMIN — TIOTROPIUM BROMIDE INHALATION SPRAY 2 PUFF: 3.12 SPRAY, METERED RESPIRATORY (INHALATION) at 06:50

## 2025-03-14 RX ADMIN — ACETAMINOPHEN 975 MG: 325 TABLET, FILM COATED ORAL at 05:49

## 2025-03-14 RX ADMIN — OXYCODONE HYDROCHLORIDE 5 MG: 5 TABLET ORAL at 12:40

## 2025-03-14 ASSESSMENT — PAIN SCALES - GENERAL
PAINLEVEL_OUTOF10: 0 - NO PAIN
PAINLEVEL_OUTOF10: 0 - NO PAIN
PAINLEVEL_OUTOF10: 10 - WORST POSSIBLE PAIN
PAINLEVEL_OUTOF10: 8

## 2025-03-14 ASSESSMENT — PAIN - FUNCTIONAL ASSESSMENT
PAIN_FUNCTIONAL_ASSESSMENT: 0-10
PAIN_FUNCTIONAL_ASSESSMENT: 0-10

## 2025-03-14 ASSESSMENT — PAIN DESCRIPTION - LOCATION: LOCATION: KNEE

## 2025-03-14 ASSESSMENT — PAIN DESCRIPTION - ORIENTATION: ORIENTATION: RIGHT

## 2025-03-14 NOTE — PROGRESS NOTES
OFFICE VISIT NOTE:    Rupa Verma is a 64 y.o. male who presents today for Back Pain.     No recent trauma recalled - worse with mowing the other day - does have some bilateral leg pain and numbness.     Back Pain  This is a chronic problem. The current episode started more than 1 year ago. The problem occurs constantly. The problem is unchanged. The pain is present in the lumbar spine and sacro-iliac. The quality of the pain is described as cramping and aching. The pain radiates to the left knee and right knee. The pain is severe. The pain is worse during the day. The symptoms are aggravated by bending and twisting. Stiffness is present in the morning. Associated symptoms include leg pain, numbness, paresthesias and weakness. Pertinent negatives include no abdominal pain, chest pain, fever or weight loss. He has tried analgesics, bed rest and home exercises for the symptoms. The treatment provided significant relief.        Past medical/surgical history, Family history, Social history, Allergies and Medications have been reviewed with the patient today and are updated in [facility] EMR. See below.  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   • Left upper lobe pulmonary nodule 9/12/2019   • Obstructive sleep apnea 12/19/2018     Past Surgical History:   Procedure Laterality Date   • CHOLECYSTECTOMY     • COLONOSCOPY     • ENDOSCOPY     • PATELLA FRACTURE SURGERY     • TONSILLECTOMY       Family History   Problem Relation Age of Onset   • Hypertension Mother    • No Known Problems Father    • Diabetes Neg Hx    • Cancer Neg Hx    • Stroke Neg Hx      Social History     Tobacco Use   • Smoking status: Current Every Day Smoker     Packs/day: 1.00     Years: 50.00     Pack years: 50.00     Types: Cigarettes   • Smokeless tobacco: Never Used   Substance Use Topics   • Alcohol use: Yes     Comment: occ   • Drug use: No       ALLERGIES:  Patient has no known  Trip Brown is a 64 y.o. male on day 2 of admission presenting with Arthritis of right knee.      Subjective   Doing well this AM, no complaints     Objective     Last Recorded Vitals  BP (!) 155/93   Pulse 72   Temp 35.7 °C (96.3 °F)   Resp 16   Wt 92 kg (202 lb 13.2 oz)   SpO2 97%     Physical Exam    G: aox3, NAD, cooperative  HENT: neck supple, no JVD  Eyes: clear sclera  CV: RRR s1 s2  L: clear  Abd: soft, NT, non distended  Ext: no c/c/e, R knee dressing  N: no appreciable acute focal deficits  Psych: appropriate mood and behavior    Assessment/Plan      SVT, h/o SVT s/p catheter ablation  R elective total knee replacement, POD#4     Polysubstance abuse (cocaine, EtOH, opioids)  Bipolar disorder  RLS  Hepatitis C  HTN, HLD  CKD III  Asthma/COPD overlap  Memory impairment   DVT ppx  Full code     Plan:  - Reviewed tele, no significant events, continue home cardizem, seen by EP  - Stopped scheduled albuterol, this can contribute to tachycardia, lungs clear and on RA, conitnue spiriva/performist  - Continue home psych meds  - Pain control, DVT ppx, PT/OT per primary orthopedic service  - Has had cognitive/memory issues for sometime now, primary service has consulted both psychiatry and neurology, he already has outpatient neuro eval, from my standpoint patient remains ok for discharge     Kalyan Loja DO       allergies.    CURRENT MEDS:    Current Outpatient Medications:   •  amLODIPine (NORVASC) 5 MG tablet, TAKE ONE TABLET BY MOUTH EVERY DAY, Disp: 90 tablet, Rfl: 1  •  aspirin 81 MG tablet, aspirin 81 mg tablet,delayed release, Disp: , Rfl:   •  citalopram (CeleXA) 20 MG tablet, TAKE ONE TABLET BY MOUTH EVERY DAY, Disp: 90 tablet, Rfl: 0  •  metoprolol tartrate (LOPRESSOR) 50 MG tablet, TAKE ONE TABLET BY MOUTH TWICE A DAY, Disp: 180 tablet, Rfl: 0  •  pantoprazole (PROTONIX) 20 MG EC tablet, Take 1 tablet by mouth Daily., Disp: 90 tablet, Rfl: 1  •  simvastatin (ZOCOR) 20 MG tablet, TAKE ONE TABLET BY MOUTH EVERY DAY, Disp: 90 tablet, Rfl: 0  •  HYDROcodone-acetaminophen (Norco) 7.5-325 MG per tablet, Take 1 tablet by mouth Every 12 (Twelve) Hours As Needed for Moderate Pain  or Severe Pain ., Disp: 60 tablet, Rfl: 0  •  methylPREDNISolone (MEDROL) 4 MG dose pack, Take as directed on package instructions. HOLD NSAIDs while on this medicine., Disp: 21 each, Rfl: 0  •  TiZANidine (ZANAFLEX) 4 MG capsule, Take 1 capsule by mouth 3 (Three) Times a Day As Needed for Muscle Spasms., Disp: 30 capsule, Rfl: 0    Current Facility-Administered Medications:   •  methylPREDNISolone acetate (DEPO-medrol) injection 40 mg, 40 mg, Intramuscular, Once, Jose Leyva MD    REVIEW OF SYSTEMS:  Review of Systems   Constitutional: Negative for activity change, appetite change, fatigue, fever, unexpected weight gain and unexpected weight loss.   Respiratory: Negative for shortness of breath.    Cardiovascular: Negative for chest pain.   Gastrointestinal: Negative for abdominal pain.   Genitourinary: Negative for difficulty urinating.   Musculoskeletal: Positive for back pain.   Skin: Negative for rash.   Neurological: Positive for weakness, numbness and paresthesias. Negative for syncope and headache.       PHYSICAL EXAMINATION:  Vital Signs:  /72 (BP Location: Right arm, Patient Position: Sitting, Cuff Size: Large Adult)    "Pulse 75   Temp 97.8 °F (36.6 °C)   Ht 190.5 cm (75\") Comment: pt reported  Wt 106 kg (234 lb 3.2 oz)   SpO2 98%   BMI 29.27 kg/m²   Vitals:    05/10/21 1333   Patient Position: Sitting       Physical Exam  Vitals and nursing note reviewed.   Constitutional:       General: He is not in acute distress.     Appearance: He is well-developed.   HENT:      Head: Normocephalic and atraumatic.      Mouth/Throat:      Mouth: Mucous membranes are moist.      Pharynx: Oropharynx is clear.   Eyes:      Extraocular Movements: Extraocular movements intact.      Pupils: Pupils are equal, round, and reactive to light.   Neck:      Vascular: No JVD.   Cardiovascular:      Rate and Rhythm: Normal rate and regular rhythm.      Pulses: Normal pulses.      Heart sounds: Normal heart sounds.   Pulmonary:      Effort: Pulmonary effort is normal. No respiratory distress.      Breath sounds: Normal breath sounds.   Abdominal:      General: Bowel sounds are normal. There is no distension.      Palpations: Abdomen is soft.      Tenderness: There is no abdominal tenderness.   Musculoskeletal:         General: Tenderness (lumbar region) present. Normal range of motion.      Cervical back: Normal range of motion and neck supple.   Skin:     General: Skin is warm and dry.      Capillary Refill: Capillary refill takes less than 2 seconds.      Findings: No rash.   Neurological:      General: No focal deficit present.      Mental Status: He is alert and oriented to person, place, and time.      Cranial Nerves: No cranial nerve deficit.      Sensory: Sensory deficit (decreased sensation on the right foot and toes more than left) present.      Motor: Weakness (bilateral quadriceps flexors 3/5 and weakness 4/5 of the left hamstring area. ) present.   Psychiatric:         Mood and Affect: Mood normal.         Behavior: Behavior normal.         Procedures    ASSESSMENT/ PLAN:  Problem List Items Addressed This Visit        Cardiac and Vasculature "    Essential hypertension       Musculoskeletal and Injuries    Lumbar back pain    Relevant Medications    methylPREDNISolone acetate (DEPO-medrol) injection 40 mg (Start on 5/10/2021  2:46 PM)    methylPREDNISolone (MEDROL) 4 MG dose pack    Other Relevant Orders    XR Spine Lumbar 4+ View      Other Visit Diagnoses     Lumbar radiculopathy    -  Primary    Relevant Medications    methylPREDNISolone acetate (DEPO-medrol) injection 40 mg (Start on 5/10/2021  2:46 PM)    methylPREDNISolone (MEDROL) 4 MG dose pack    Other Relevant Orders    XR Spine Lumbar 4+ View    Lumbar paraspinal muscle spasm        Relevant Medications    TiZANidine (ZANAFLEX) 4 MG capsule            Specific Patient Instructions:  MEDICATION Instructions: Encouraged patient to continue routine medicines as prescribed and maintain compliance. Patient instructed to report any adverse side effects or reactions to medicines promptly to the office. Patient instructed to make us aware of any OTC or herbal meds or supplement use.    DIET Recommendations: Patient instructed and provided information on the following nutrition and diet recommendations: Calorie restriction for weight reduction and maintenance. Necessity for adequate daily intake of fluids/water. Also, diet information provided in AVS for specifics.    EXERCISE Instructions: Discussed with patient the need for routine aerobic activity for cardiovascular fitness, 3 times a week for about 30 minutes. Daily exercise for increased fitness and weight reduction goals.    SMOKING Recommendations: Counseled patient and encouraged them on smoking and tobacco cessation if applicable. Discussed the benefits to all body systems with smoking/tobacco cessation, including decreased cardiac/lung/stroke/cancer risk. Encouraged no vaping use.    HEALTH MAINTENANCE:  Counseling provided to patient/family about routine health maintenance and ANNUAL physicals/labs. Counseling on recommended Vaccinations  appropriate for age needed.        Patient's Body mass index is 29.27 kg/m². indicating that he is overweight (BMI 25-29.9). Obesity-related health conditions include the following: hypertension and osteoarthritis. Obesity is unchanged. BMI is is above average; BMI management plan is completed. We discussed portion control and increasing exercise.      Medications or Orders placed this visit:  Orders Placed This Encounter   Procedures   • XR Spine Lumbar 4+ View     Standing Status:   Future     Standing Expiration Date:   5/10/2022     Scheduling Instructions:      Pt requests Omar.     Order Specific Question:   Reason for Exam:     Answer:   lumbar pain and radiculopathy bilateral     Order Specific Question:   Release to patient     Answer:   Immediate       Medications DISCONTINUED this visit:  Medications Discontinued During This Encounter   Medication Reason   • cyclobenzaprine (FLEXERIL) 10 MG tablet *Therapy completed       FOLLOW-UP:  Return in about 4 weeks (around 6/7/2021) for Recheck, Next scheduled follow up.    I discussed the patients findings and my recommendations with patient.  An After Visit Summary (AVS) was printed and given to the patient at discharge.        Jose Leyva MD, FAAFP  5/10/2021

## 2025-03-14 NOTE — CARE PLAN
The patient's goals for the shift include  remain free from falls and injuries    The clinical goals for the shift include patient to have decreas pain    Over the shift, the patient did make progress toward the following goals.

## 2025-03-14 NOTE — NURSING NOTE
Report called to CARMEN Gambino at [facility] [place] @ 1299. Pt has a pickup time of 1300.

## 2025-03-14 NOTE — CARE PLAN
The patient's goals for the shift include      The clinical goals for the shift include have adequate pain control    Over the shift, the patient did not make progress toward the following goals. Barriers to progression include refusing treatment. Recommendations to address these barriers include encourage ice    Problem: Pain - Adult  Goal: Verbalizes/displays adequate comfort level or baseline comfort level  Outcome: Not Progressing

## 2025-03-14 NOTE — PROGRESS NOTES
Physical Therapy                 Therapy Communication Note    Patient Name: Trip Brown  MRN: [id number]  Department: [facility]  Room: [address] [place], [place] [address]  Today's Date: 3/14/2025     Discipline: Physical Therapy    PT Missed Visit: Yes     Missed Visit Reason: Missed Visit Reason: Patient refused (Pt politely declined; to be D/C later today)    Missed Time: Attempt    Comment:

## 2025-03-14 NOTE — PROGRESS NOTES
TCC Note: Pt did have a written discharge to admit to SNF yesterday however, was displaying behaviors and was needing to be cleared by both Neuro and Psych. Both or those notes reviewed and pt is cleared for discharge. The previous TCC from Ortho is still following as well and has been communicating with family. Message sent to her asking if she will be setting up the transport to the SNF this morning so that there are not 2 TCCs setting up transport for the same pt. Waiting for her response back. Luz Sheppard, ALDA, RN, TCC.     Notes: Transport time is set up for pt to go to [facility] [place], [place] at 1:00 PM today. RN, Unit Mgr, Unit Campbell all aware. RN to inform pt as pt is Bipolar and they have already established a therapeutic relationship with pt. I did speak with pt 's sister Peace and informed her. She was appreciative for the call. Luz Sheppard, MSN, RN, TCC.

## 2025-03-14 NOTE — PROGRESS NOTES
"Trip Brown is a 64 y.o. male on day 2 of admission presenting with Arthritis of right knee.    Subjective   Pt seen in his room this morning  Endorses poor sleep overnight   Flat affect  Speaking about getting better so he is able to go home and ride his motorcycle  Pt is concerned about the swelling in his knee/leg  Looking forward to going to rehab, does not want to be in the hospital ay longer, does not like the food  No acute agitation overnight  No PRN medication given      Physical Exam  Psychiatric:         Attention and Perception: Attention and perception normal.         Mood and Affect: Affect is flat.         Speech: Speech normal.         Behavior: Behavior is cooperative.         Thought Content: Thought content normal.         Cognition and Memory: Cognition is impaired. Memory is impaired. He exhibits impaired recent memory and impaired remote memory.         Judgment: Judgment is inappropriate.         Last Recorded Vitals  Blood pressure 161/88, pulse 74, temperature 35.8 °C (96.4 °F), resp. rate 16, height 1.829 m (6' 0.01\"), weight 92 kg (202 lb 13.2 oz), SpO2 95%.  Intake/Output last 3 Shifts:  I/O last 3 completed shifts:  In: 222 (2.4 mL/kg) [P.O.:222]  Out: 830 (9 mL/kg) [Urine:830 (0.3 mL/kg/hr)]  Weight: 92 kg     Relevant Results  Vitals:    03/14/25 0047 03/14/25 0633 03/14/25 0650 03/14/25 0654   BP: (!) 170/95 161/88     BP Location:       Patient Position:       Pulse: 74      Resp:       Temp: 36 °C (96.8 °F) 35.8 °C (96.4 °F)     TempSrc:       SpO2: 93% 93% 96% 95%   Weight:       Height:           Recent Results (from the past 72 hours)   Basic metabolic panel    Collection Time: 03/11/25  2:41 PM   Result Value Ref Range    Glucose 132 (H) 74 - 99 mg/dL    Sodium 136 136 - 145 mmol/L    Potassium 4.2 3.5 - 5.3 mmol/L    Chloride 107 98 - 107 mmol/L    Bicarbonate 21 21 - 32 mmol/L    Anion Gap 12 10 - 20 mmol/L    Urea Nitrogen 25 (H) 6 - 23 mg/dL    Creatinine 1.62 (H) " 0.50 - 1.30 mg/dL    eGFR 47 (L) >60 mL/min/1.73m*2    Calcium 8.9 8.6 - 10.3 mg/dL   Magnesium    Collection Time: 03/11/25  2:41 PM   Result Value Ref Range    Magnesium 1.62 1.60 - 2.40 mg/dL   Basic metabolic panel    Collection Time: 03/12/25  5:55 AM   Result Value Ref Range    Glucose 131 (H) 74 - 99 mg/dL    Sodium 135 (L) 136 - 145 mmol/L    Potassium 4.7 3.5 - 5.3 mmol/L    Chloride 108 (H) 98 - 107 mmol/L    Bicarbonate 16 (L) 21 - 32 mmol/L    Anion Gap 16 10 - 20 mmol/L    Urea Nitrogen 19 6 - 23 mg/dL    Creatinine 1.32 (H) 0.50 - 1.30 mg/dL    eGFR 60 (L) >60 mL/min/1.73m*2    Calcium 9.1 8.6 - 10.3 mg/dL   Lavender Top    Collection Time: 03/12/25  5:55 AM   Result Value Ref Range    Extra Tube Hold for add-ons.    Magnesium    Collection Time: 03/12/25  5:55 AM   Result Value Ref Range    Magnesium 1.77 1.60 - 2.40 mg/dL   POCT GLUCOSE    Collection Time: 03/12/25  7:43 AM   Result Value Ref Range    POCT Glucose 177 (H) 74 - 99 mg/dL   ECG 12 lead    Collection Time: 03/12/25  8:04 AM   Result Value Ref Range    Ventricular Rate 86 BPM    Atrial Rate 86 BPM    HI Interval 174 ms    QRS Duration 86 ms    QT Interval 354 ms    QTC Calculation(Bazett) 423 ms    P Axis 49 degrees    R Axis -2 degrees    T Axis 74 degrees    QRS Count 13 beats    Q Onset 225 ms    P Onset 138 ms    P Offset 191 ms    T Offset 402 ms    QTC Fredericia 399 ms   Basic Metabolic Panel    Collection Time: 03/13/25  5:15 AM   Result Value Ref Range    Glucose 123 (H) 74 - 99 mg/dL    Sodium 137 136 - 145 mmol/L    Potassium 4.3 3.5 - 5.3 mmol/L    Chloride 108 (H) 98 - 107 mmol/L    Bicarbonate 19 (L) 21 - 32 mmol/L    Anion Gap 14 10 - 20 mmol/L    Urea Nitrogen 18 6 - 23 mg/dL    Creatinine 1.14 0.50 - 1.30 mg/dL    eGFR 71 >60 mL/min/1.73m*2    Calcium 9.1 8.6 - 10.3 mg/dL   Folate    Collection Time: 03/13/25  3:24 PM   Result Value Ref Range    Folate, Serum 10.1 >5.0 ng/mL   Syphilis Screen with Reflex    Collection  Time: 03/13/25  3:24 PM   Result Value Ref Range    Syphilis Total Ab Nonreactive Nonreactive   Vitamin B12    Collection Time: 03/13/25  3:24 PM   Result Value Ref Range    Vitamin B12 264 211 - 911 pg/mL   Lithium    Collection Time: 03/13/25  3:24 PM   Result Value Ref Range    Lithium 0.88 0.60 - 1.20 mmol/L       No results found.      Assessment/Plan   Assessment & Plan  Arthritis of right knee    S/P TKR (total knee replacement) not using cement, right    Acute confusion      DX:   Delirium  Bipolar disorder  Cocaine abuse - hx  Opiate abuse - hx     PLAN:   Pt has written discharge  Mentation likely to improve with better rest and adequate pain control  Plan is for SNF    I spent 30 minutes in the professional and overall care of this patient.      Wild Ortiz, APRN-CNP

## 2025-03-16 LAB
ATRIAL RATE: 86 BPM
P AXIS: 49 DEGREES
P OFFSET: 191 MS
P ONSET: 138 MS
PR INTERVAL: 174 MS
Q ONSET: 225 MS
QRS COUNT: 13 BEATS
QRS DURATION: 86 MS
QT INTERVAL: 354 MS
QTC CALCULATION(BAZETT): 423 MS
QTC FREDERICIA: 399 MS
R AXIS: -2 DEGREES
T AXIS: 74 DEGREES
T OFFSET: 402 MS
VENTRICULAR RATE: 86 BPM

## 2025-03-16 NOTE — DISCHARGE SUMMARY
Orthopaedic Surgery Discharge Summary    Patient ID: Trip Brown [id number] 64 y.o. 1960    Admit date: 3/10/2025    Discharge date and time:3/14/2025    Attending Physician: Geoffrey Urrutia MD    Reason foradmission: Planned surgical procedure    Procedures: Right total knee arthroplasty    Hospital Course:  Patient was admitted after planned surgical procedure. After pain was controlled, medical issues were stable, patient was discharged in stable condition. They were given appropriate follow-up with surgical team and any necessary medical teams.    Hospital diagnoses treated during hospitalization:  Acute on chronic kidney injury, resolved  Mild post-op delirium, resolved  Supraventricular tachycardia, resolved    Discharge exam:  NAD, comfortable See last progress note for detailed exam       Discharge Diagnoses: Arthritis of right knee [M17.11]  S/P TKR (total knee replacement) not using cement, right [Z96.651]    Patient Condition: Stable    Disposition: Skilled nursing facility    Patient Instructions:  Instructions given to patient prior to discharge    Discharge Medications:      Your medication list        START taking these medications        Instructions Last Dose Given Next Dose Due   acetaminophen 325 mg tablet  Commonly known as: Tylenol      Take 3 tablets (975 mg) by mouth every 6 hours if needed for mild pain (1 - 3).       aspirin 81 mg EC tablet      Take 1 tablet (81 mg) by mouth 2 times a day. For 30 days       docusate sodium 100 mg capsule  Commonly known as: Colace      Take 1 capsule (100 mg) by mouth 2 times a day.       FLUoxetine 40 mg capsule  Commonly known as: PROzac  Replaces: FLUoxetine 60 mg tablet      Take 1 capsule (40 mg) by mouth once daily. Do not fill before March 14, 2025.       oxyCODONE 5 mg immediate release tablet  Commonly known as: Roxicodone      Take 1-2 tablets (5-10 mg) by mouth every 6 hours if needed for severe pain (7 - 10) for up to 3 days.        pantoprazole 40 mg EC tablet  Commonly known as: ProtoNix      Take 1 tablet (40 mg) by mouth once daily in the morning. Take before meals for 20 doses. For 21 days.  Do not crush, chew, or split.       polyethylene glycol 17 gram packet  Commonly known as: Glycolax, Miralax      Take 17 g by mouth 2 times a day as needed (for constipation).              CONTINUE taking these medications        Instructions Last Dose Given Next Dose Due   cholecalciferol 25 mcg (1000 units) tablet  Commonly known as: Vitamin D-3           dilTIAZem  mg 24 hr capsule  Commonly known as: Cardizem CD           fluticasone 50 mcg/actuation nasal spray  Commonly known as: Flonase           gabapentin 300 mg capsule  Commonly known as: Neurontin      TAKE 1 CAPSULE (300 MG) BY MOUTH 3 TIMES A DAY.       ipratropium-albuteroL 0.5-2.5 mg/3 mL nebulizer solution  Commonly known as: Duo-Neb           lithium 300 mg capsule           nicotine 21 mg/24 hr patch  Commonly known as: Nicoderm CQ      Place 1 patch over 24 hours on the skin once every 24 hours.       nicotine polacrilex 4 mg lozenge  Commonly known as: Commit      Dissolve 1 lozenge (4 mg) in the mouth every 2 hours if needed for smoking cessation.       OLANZapine 7.5 mg tablet  Commonly known as: ZyPREXA           Stiolto Respimat 2.5-2.5 mcg/actuation mist inhaler  Generic drug: tiotropium-olodateroL      Inhale 2 Inhalations once daily.       tamsulosin 0.4 mg 24 hr capsule  Commonly known as: Flomax      TAKE 1 CAPSULE (0.4 MG) BY MOUTH ONCE DAILY.       tiotropium 18 mcg inhalation capsule  Commonly known as: Spiriva           traZODone 100 mg tablet  Commonly known as: Desyrel           Ventolin HFA 90 mcg/actuation inhaler  Generic drug: albuterol                  STOP taking these medications      chlorhexidine 0.12 % solution  Commonly known as: Peridex        chlorhexidine 4 % external liquid  Commonly known as: Hibiclens        cyclobenzaprine 10 mg  tablet  Commonly known as: Flexeril        FLUoxetine 60 mg tablet  Commonly known as: PROzac  Replaced by: FLUoxetine 40 mg capsule                  Where to Get Your Medications        You can get these medications from any pharmacy    Bring a paper prescription for each of these medications  oxyCODONE 5 mg immediate release tablet       Information about where to get these medications is not yet available    Ask your nurse or doctor about these medications  acetaminophen 325 mg tablet  aspirin 81 mg EC tablet  docusate sodium 100 mg capsule  FLUoxetine 40 mg capsule  pantoprazole 40 mg EC tablet  polyethylene glycol 17 gram packet         Outpatient Follow-Up  Future Appointments   Date Time Provider Department [place]   4/3/2025 10:30 AM Geoffrey Urrutia MD [id number] [place]   4/30/2025 10:00 AM Randy Zhao MD [id number] [facility]   8/12/2025 11:00 AM Geoffrey Chau MD [id number] [place]       Geoffrey Urrutia MD  Department of Orthopaedic Surgery  Division of Adult Reconstruction  , [facility]

## 2025-03-17 NOTE — DOCUMENTATION CLARIFICATION NOTE
"    PATIENT:               LEIGHA SANTIAGO  ACCT #:                  [id number]  MRN:                       [id number]  :                       1960  ADMIT DATE:       3/10/2025 6:26 AM  DISCH DATE:        3/14/2025 2:21 PM  RESPONDING PROVIDER #:        [id number]          PROVIDER RESPONSE TEXT:    Multifactorial encephalopathy, Toxic Metabolic encephalopathy 2/2 YASMINE, pain medication, anesthesia and possibly sleep disturbance    CDI QUERY TEXT:    Clarification        Instruction:    Based on your assessment of the patient and the clinical information, please provide the requested documentation by clicking on the appropriate radio button and enter any additional information if prompted.    Question: Please further clarify the most likely etiology of the altered mental status/confusion as    When answering this query, please exercise your independent professional judgment. The fact that a question is being asked, does not imply that any particular answer is desired or expected.    The patient's clinical indicators include:  Clinical Information:  64 year old male presented with right knee OA s/p right total knee replacement.    Clinical Indicators:  LABS, 3/10 - 3/13:  Creatinine: 1.46, 1.77, 1.62, 1.32, 1.14    3/12/2025 Orthopedics PN:  \"Patient with some confusion today.  Pain meds held by medical service\"    3/13/2025 Psychiatry Consult:  \"Sister endorses that pt is not at his baseline due to behavior and increased confusion. ...Delirium - likely multifactorial due to YASMINE, pain medication, anesthesia and possibly sleep disturbance    3/13/2025 Neurology Consult:  \" patient was confused and agitated, refusing placement of peripheral IV.  He is no longer agitated but still appears to be confused...Over the past 24 hours patient has improved in terms of agitation but per his sister his confusion is not at his neurologic baseline.  Likely cause for this issue is hospital induced delirium, secondary to poor " sleep, receiving general anesthesia, being in a state of YASMINE, and having underlying neurocognitive impairment.    Treatment:  - Neurology consult  - Psychiatry consult  - Haldol PRN for agitation  - 3/10 LR infusion 100 mL/hr  - 3/11 1L NS bolus  - 3/12 1L LR bolus  - 3/12 NS infusion 100 mL/hr      Risk Factors:  YASMINE, s/p surgery with anesthesia, pain medicine, underlying neurocognitive impairment  Options provided:  -- Multifactorial encephalopathy, Toxic Metabolic encephalopathy 2/2 YASMINE, pain medication, anesthesia and possibly sleep disturbance  -- Metabolic encephalopathy 2/2 YASMINE  -- Toxic encephalopathy 2/2 pain medication, anesthesia  -- Other - I will add my own diagnosis  -- Refer to Clinical Documentation Reviewer    Query created by: Megan Pitts on 3/14/2025 8:56 AM      Electronically signed by:  JOSE A TURNER-CNS 3/17/2025 2:50 PM

## 2025-03-18 LAB — METHYLMALONATE SERPL-SCNC: 0.3 UMOL/L (ref 0–0.4)

## 2025-03-19 RX ORDER — HYALURONATE SODIUM 16.8MG/2ML
16.8 SYRINGE (ML) INTRAARTICULAR WEEKLY
Qty: 6 ML | Refills: 0 | Status: SHIPPED | OUTPATIENT
Start: 2025-03-19 | End: 2025-04-03

## 2025-03-23 DIAGNOSIS — M17.10 ARTHRITIS OF KNEE: Primary | ICD-10-CM

## 2025-03-23 DIAGNOSIS — Z96.651 S/P TOTAL KNEE ARTHROPLASTY, RIGHT: ICD-10-CM

## 2025-03-26 ENCOUNTER — OFFICE VISIT (OUTPATIENT)
Dept: ORTHOPEDIC SURGERY | Facility: CLINIC | Age: 65
End: 2025-03-26
Payer: MEDICARE

## 2025-03-26 ENCOUNTER — HOSPITAL ENCOUNTER (OUTPATIENT)
Dept: RADIOLOGY | Facility: CLINIC | Age: 65
Discharge: HOME | End: 2025-03-26
Payer: MEDICARE

## 2025-03-26 DIAGNOSIS — M17.11 ARTHRITIS OF RIGHT KNEE: Primary | ICD-10-CM

## 2025-03-26 DIAGNOSIS — Z96.651 S/P TKR (TOTAL KNEE REPLACEMENT) USING CEMENT, RIGHT: ICD-10-CM

## 2025-03-26 PROCEDURE — 73562 X-RAY EXAM OF KNEE 3: CPT | Mod: 50

## 2025-03-26 PROCEDURE — 1159F MED LIST DOCD IN RCRD: CPT | Performed by: STUDENT IN AN ORGANIZED HEALTH CARE EDUCATION/TRAINING PROGRAM

## 2025-03-26 PROCEDURE — 99024 POSTOP FOLLOW-UP VISIT: CPT | Performed by: STUDENT IN AN ORGANIZED HEALTH CARE EDUCATION/TRAINING PROGRAM

## 2025-03-26 PROCEDURE — 1111F DSCHRG MED/CURRENT MED MERGE: CPT | Performed by: STUDENT IN AN ORGANIZED HEALTH CARE EDUCATION/TRAINING PROGRAM

## 2025-03-26 NOTE — PROGRESS NOTES
"Orthopaedic Surgery Progress Note     DOS: 3/10/2025  Right total knee arthroplasty     Subjective: Patient presents to clinic for 2 week post-op check.  He reports doing very well.  He was able to wean off narcotics and return to his sober living facility.  He is walking unassisted for community distances.  Denies wound drainage and erythema.  Lower leg swelling at the facility prompted a DVT U/S which was negative.  Starting outpatient PT soon.  On phone follow-up he reported to me a couple day lapse in his ASA after leaving the SNF, but is back on it now.  Notes no pain with walking and is happy to be not \"knocking knees.\"     Objective:   Awake and alert  Normal work of breathing     RLE:  Dressing dry  ROM: 0-120 after relaxing hamstrings, 5 degrees short of terminal extension with hamstrings on stretch  Intact ehl/fhl/ta/gsc  SILT s/s/sp/dp/t  Palpable DP pulse     Imaging: Cemented PS total knee arthroplasty in good alignment, no signs of loosening.    A/P: 64-year-old male s/p right total knee arthroplasty, progressing very well. No signs of infection. Great early motion.  Walking unassisted.  I would like him to work on hamstring lengthening in PT.   - WBAT RLE  - Continue DVT ppx: ASA 81 mg BID 4 weeks  - Continue PT  - Return to clinic in 4 weeks with x-rays     Geoffrey Urrutia MD  Department of Orthopaedic Surgery  Division of Adult Reconstruction  , [facility]     "

## 2025-03-27 ENCOUNTER — APPOINTMENT (OUTPATIENT)
Dept: ORTHOPEDIC SURGERY | Facility: CLINIC | Age: 65
End: 2025-03-27
Payer: MEDICARE

## 2025-03-27 LAB
LABORATORY COMMENT REPORT: NORMAL
PATH REPORT.FINAL DX SPEC: NORMAL
PATH REPORT.GROSS SPEC: NORMAL
PATH REPORT.RELEVANT HX SPEC: NORMAL
PATH REPORT.TOTAL CANCER: NORMAL

## 2025-04-02 ENCOUNTER — EVALUATION (OUTPATIENT)
Dept: PHYSICAL THERAPY | Facility: CLINIC | Age: 65
End: 2025-04-02
Payer: MEDICARE

## 2025-04-02 DIAGNOSIS — M25.561 CHRONIC KNEE PAIN AFTER TOTAL REPLACEMENT OF RIGHT KNEE JOINT: Primary | ICD-10-CM

## 2025-04-02 DIAGNOSIS — G89.29 CHRONIC KNEE PAIN AFTER TOTAL REPLACEMENT OF RIGHT KNEE JOINT: Primary | ICD-10-CM

## 2025-04-02 DIAGNOSIS — Z96.651 CHRONIC KNEE PAIN AFTER TOTAL REPLACEMENT OF RIGHT KNEE JOINT: Primary | ICD-10-CM

## 2025-04-02 DIAGNOSIS — Z96.651 S/P TOTAL KNEE ARTHROPLASTY, RIGHT: ICD-10-CM

## 2025-04-02 PROCEDURE — 97110 THERAPEUTIC EXERCISES: CPT | Mod: GP

## 2025-04-02 PROCEDURE — 97161 PT EVAL LOW COMPLEX 20 MIN: CPT | Mod: GP

## 2025-04-02 ASSESSMENT — PATIENT HEALTH QUESTIONNAIRE - PHQ9
SUM OF ALL RESPONSES TO PHQ9 QUESTIONS 1 AND 2: 0
1. LITTLE INTEREST OR PLEASURE IN DOING THINGS: NOT AT ALL
2. FEELING DOWN, DEPRESSED OR HOPELESS: NOT AT ALL

## 2025-04-02 ASSESSMENT — COLUMBIA-SUICIDE SEVERITY RATING SCALE - C-SSRS
2. HAVE YOU ACTUALLY HAD ANY THOUGHTS OF KILLING YOURSELF?: NO
1. IN THE PAST MONTH, HAVE YOU WISHED YOU WERE DEAD OR WISHED YOU COULD GO TO SLEEP AND NOT WAKE UP?: NO
6. HAVE YOU EVER DONE ANYTHING, STARTED TO DO ANYTHING, OR PREPARED TO DO ANYTHING TO END YOUR LIFE?: NO
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
## 2025-04-03 ENCOUNTER — APPOINTMENT (OUTPATIENT)
Dept: ORTHOPEDIC SURGERY | Facility: CLINIC | Age: 65
End: 2025-04-03
Payer: MEDICARE

## 2025-04-07 ENCOUNTER — TREATMENT (OUTPATIENT)
Dept: PHYSICAL THERAPY | Facility: CLINIC | Age: 65
End: 2025-04-07
Payer: MEDICARE

## 2025-04-07 DIAGNOSIS — M25.561 CHRONIC KNEE PAIN AFTER TOTAL REPLACEMENT OF RIGHT KNEE JOINT: ICD-10-CM

## 2025-04-07 DIAGNOSIS — Z96.651 CHRONIC KNEE PAIN AFTER TOTAL REPLACEMENT OF RIGHT KNEE JOINT: ICD-10-CM

## 2025-04-07 DIAGNOSIS — G89.29 CHRONIC KNEE PAIN AFTER TOTAL REPLACEMENT OF RIGHT KNEE JOINT: ICD-10-CM

## 2025-04-07 PROCEDURE — 97112 NEUROMUSCULAR REEDUCATION: CPT | Mod: GP

## 2025-04-07 PROCEDURE — 97110 THERAPEUTIC EXERCISES: CPT | Mod: GP

## 2025-04-07 PROCEDURE — 97016 VASOPNEUMATIC DEVICE THERAPY: CPT | Mod: GP

## 2025-04-07 NOTE — PROGRESS NOTES
Physical Therapy Treatment Note      Patient Name Trip Brown  MRN: [id number]  Today's Date: 4/7/2025  Time Calculation  Start Time: 1555  Stop Time: 1650  Time Calculation (min): 55 min    Insurance: Payor: [employer]AMES [employer]IC[employer] / Plan: [employer]AMES DUAL ADVANTAGE / Product Type: *No Product type* /   Visit #: 2$25 COPAY VISITS ARE MED NEC NEEDS AUTH CARELON PAYS % OOP 4150.00 75.77 APPLIED     -authorization required: yes  -number of visits authorized  -authorization/ certification dates:  Last reasmt: eval    Problem List Items Addressed This Visit             ICD-10-CM    Chronic knee pain after total replacement of right knee joint M25.561, G89.29, Z96.651       General:  Date of Onset:  DOS 3/10/25   Referred by: Geoffrey Urrutia MD Next MD appt: 4/30/25   Date of Last Surgery: 3/10/2025  Procedure: Right Total Knee Arthroplasty - Right  Post-Op Days: 28     Precautions: no fall risk    Compliant with HEP:  yes    Understanding of HEP: WNL    Subjective:  General:  Using ice machine at home  C/o some LBP- 2 previous surgeries    Functional Progress:  Amb w/o device    Pain  Pain assessment 0-10  Pain score: 6  Pain location: R knee    Objective:  see exercise log  **indicates new exercises or progression  NP=not performed    Therapeutic Exercise  35 minutes    Neuromuscular Re-education:  10 minutes      Modalities    Vasopneumatic Device  Mod pressure R knee supine  10 minutes    Other     Exercise Log: [id number]   AROM knee R 3 -124   Nustep L3 5'  Slantboard stretch 10 sec 10x **  QS 10x with heel prop **  Adductor sets 10x  Hooklying hip abduction blue 10x2  Seated hamstring curls blue 10x2 **  SLR 7 reps, 4 reps no hold **  Supine hamstring stretch 10 sec 5x  LAQ 10x2  2# **  Sit to stand 8x2 **     Standing calf raises 10x2 airex **  SLS 10 sec 10x  airex **    Education:  Instructed in progression of exercises and issued written instructions  V/c's for knee ext with CKC activities and with amb  Some  re-instruction required    Assessment:  skilled intervention: exercise progression for strength/function    Patient would continue to benefit from skilled PT to address remaining functional, objective and subjective deficits to allow them to return to full independence with ADLs     Added heel prop with QS to facilitate extension  Progressed with functional and balance activities in [facility]  LBP is cause for some limitation with SLR    Plan:  Shuttle leg press and calf raises

## 2025-04-09 ENCOUNTER — TREATMENT (OUTPATIENT)
Dept: PHYSICAL THERAPY | Facility: CLINIC | Age: 65
End: 2025-04-09
Payer: MEDICARE

## 2025-04-09 DIAGNOSIS — Z96.651 CHRONIC KNEE PAIN AFTER TOTAL REPLACEMENT OF RIGHT KNEE JOINT: ICD-10-CM

## 2025-04-09 DIAGNOSIS — M25.561 CHRONIC KNEE PAIN AFTER TOTAL REPLACEMENT OF RIGHT KNEE JOINT: ICD-10-CM

## 2025-04-09 DIAGNOSIS — G89.29 CHRONIC KNEE PAIN AFTER TOTAL REPLACEMENT OF RIGHT KNEE JOINT: ICD-10-CM

## 2025-04-09 PROCEDURE — 97110 THERAPEUTIC EXERCISES: CPT | Mod: GP

## 2025-04-09 PROCEDURE — 97112 NEUROMUSCULAR REEDUCATION: CPT | Mod: GP

## 2025-04-09 PROCEDURE — 97016 VASOPNEUMATIC DEVICE THERAPY: CPT | Mod: GP

## 2025-04-09 NOTE — PROGRESS NOTES
"Physical Therapy Treatment Note      Patient Name Trip Brown  MRN: [id number]  Today's Date: 4/9/2025       Insurance: Payor: JAMES MEDICARE / Plan: [employer]AMES DUAL ADVANTAGE / Product Type: *No Product type* /   Visit #: 3$25 COPAY VISITS ARE MED NEC NEEDS AUTH CARELON PAYS % OOP 4150.00 75.77 APPLIED     -authorization required: yes  -number of visits authorized  -authorization/ certification dates:  Last reasmt: eval    Problem List Items Addressed This Visit             ICD-10-CM    Chronic knee pain after total replacement of right knee joint M25.561, G89.29, Z96.651       General:  Date of Onset:  DOS 3/10/25   Referred by: Geoffrey Urrutia MD Next MD appt: 4/30/25   Date of Last Surgery: 3/10/2025  Procedure: Right Total Knee Arthroplasty - Right  Post-Op Days: 30     Precautions: no fall risk    Compliant with HEP:  yes    Understanding of HEP: WNL    Subjective:  General:  Reporting primarily stiffness in R knee    Functional Progress:  Reports that steps are doing ok at home    Pain  Pain assessment 0-10  Pain score: stiffness  Pain location: R knee    Objective:  Therapeutic Exercise    minutes  see below  **indicates new exercises or progression  NP=not performed    Neuromuscular Re-education:    minutes  See below  **indicates new exercises or progression  NP=not performed    Manual Therapy:      minutes      Modalities   Cold Pack                          minutes    Vasopneumatic Device  Mod pressure R knee supine     minutes      Other     Exercise Log:  [id number]   AROM knee R 3 -130   Nustep L3 6'  Slantboard stretch 10 sec 10x   QS 10x with heel prop , 4' with biofeedback (goal 200)  Adductor sets 20x  Hooklying hip abduction blue 10x2  Seated hamstring curls blue 10x2   SLR 10x  8x    3' with biofeedback (goal 200)  Supine hamstring stretch 10 sec 5x  LAQ 10x2  2#   Sit to stand 8x2 NP     Standing calf raises 10x2 airex   SLS 10 sec 10x  airex   Step ups 6\" 10x  F/L **    Shuttle BLE leg " press  56# 10x2 **  Shuttle BLE calf raises  56# 10x2 **    Education:  Instructed in progression of exercises    Assessment:  skilled intervention: exercise progression for strength    Patient would continue to benefit from skilled PT to address remaining functional, objective and subjective deficits to allow them to return to full independence with ADLs     Cont to amb w/o device with dec knee ext at heel strike although does tend to do this B  Able to perform inc reps with SLR today however still unable to perform w/o quad lag  Initiated biofeedback to address thsi    Plan:  Single leg shuttle  Step downs  Cont biofeedback

## 2025-04-14 ENCOUNTER — APPOINTMENT (OUTPATIENT)
Dept: PHYSICAL THERAPY | Facility: CLINIC | Age: 65
End: 2025-04-14
Payer: MEDICARE

## 2025-04-16 ENCOUNTER — APPOINTMENT (OUTPATIENT)
Dept: PHYSICAL THERAPY | Facility: CLINIC | Age: 65
End: 2025-04-16
Payer: MEDICARE

## 2025-04-16 ENCOUNTER — TREATMENT (OUTPATIENT)
Dept: PHYSICAL THERAPY | Facility: CLINIC | Age: 65
End: 2025-04-16
Payer: MEDICARE

## 2025-04-16 DIAGNOSIS — G89.29 CHRONIC KNEE PAIN AFTER TOTAL REPLACEMENT OF RIGHT KNEE JOINT: ICD-10-CM

## 2025-04-16 DIAGNOSIS — Z96.651 CHRONIC KNEE PAIN AFTER TOTAL REPLACEMENT OF RIGHT KNEE JOINT: ICD-10-CM

## 2025-04-16 DIAGNOSIS — M25.561 CHRONIC KNEE PAIN AFTER TOTAL REPLACEMENT OF RIGHT KNEE JOINT: ICD-10-CM

## 2025-04-16 PROCEDURE — 97110 THERAPEUTIC EXERCISES: CPT | Mod: GP

## 2025-04-16 PROCEDURE — 97016 VASOPNEUMATIC DEVICE THERAPY: CPT | Mod: GP

## 2025-04-16 NOTE — PROGRESS NOTES
Physical Therapy Treatment Note      Patient Name Trip Brown  MRN: [id number]  Today's Date: 4/16/2025  Time Calculation  Start Time: 1025  Stop Time: 1120  Time Calculation (min): 55 min    Insurance: Payor: JAMES MEDICARE / Plan: JAMES DUAL ADVANTAGE / Product Type: *No Product type* /   Visit #: 4$25 COPAY VISITS ARE MED NEC NEEDS AUTH CARELON PAYS % OOP 4150.00 75.77 APPLIED     -authorization required: yes  -number of visits authorized 13  -authorization/ certification dates: 4/2/25-7/1/25  Last reasmt: eval    Problem List Items Addressed This Visit           ICD-10-CM    Chronic knee pain after total replacement of right knee joint M25.561, G89.29, Z96.651     General:  Date of Onset:  DOS 3/10/25   Referred by: Geoffrey Urrutia MD  Next MD appt: 4/30/25   Date of Last Surgery: 3/10/2025  Procedure: Right Total Knee Arthroplasty - Right  Post-Op Days: 37     Precautions: no fall risk    Compliant with HEP:  yes    Understanding of HEP: WNL    Subjective:  General:  Now c/o B posterior knee pain in area of lateral hamstring tendon    Functional Progress:  Hasa been walking more    Pain  Pain assessment 0-10  Pain score: 4  Pain location: B posterior knee    Objective:  Therapeutic Exercise  45 minutes  see below  **indicates new exercises or progression  NP=not performed    Neuromuscular Re-education:    minutes  See below  **indicates new exercises or progression  NP=not performed    Manual Therapy:    Green roller bar to posterior distal thigh/proximal calf prone  5 minutes    Modalities     Vasopneumatic Device  Mod pressure R knee supine   10 minutes      Other     Exercise Log:  [id number]   AROM knee R 1 -130   Nustep L3 5'  Slantboard stretch 10 sec 10x   QS 10x with towel under knee for first 5' , with biofeedback (goal 230)-no towel needed under knee for last 5'  Seated hamstring curls blue 10x2   TKE black 10x2 **  SLR 10x  8x    3' with biofeedback (goal 200)  Supine hamstring stretch 10  "sec 5x  LAQ 10x2  2#   Sit to stand 8x2 NP     Standing calf raises 10x2 airex   SLS 10 sec 10x  airex   Step ups 6\" 10x  F/L 7  Step downs 4\" 10x **     Shuttle BLE leg press  56# 10x2   Shuttle BLE calf raises  56# 10x2   Shuttle RLE leg press 31# 10x2 **    Adductor sets 20x (HEP)  Hooklying hip abduction blue 10x2 (HEP)  Education:  Instructed in progression of exercises  V/c's t/o session for improved knee ext    Assessment:  skilled intervention: exercise progression for strength/function    Patient would continue to benefit from skilled PT to address remaining functional, objective and subjective deficits to allow them to return to full independence with ADLs     Arrives with new onset of posterior L knee pain and amb with B knees flexed  Cont to address correct gait pattern to improve knee ext at heel strike  Initiated STM posterior leg for soft tissue release  Still with quad lag with SLR  Needed towel under knee today initially  to improve quad activation with biofeedback    Plan:  Cont biofeedback  Isometrics on knee ext with NMES    "

## 2025-04-22 ENCOUNTER — DOCUMENTATION (OUTPATIENT)
Dept: PHYSICAL THERAPY | Facility: CLINIC | Age: 65
End: 2025-04-22
Payer: MEDICARE

## 2025-04-23 ENCOUNTER — APPOINTMENT (OUTPATIENT)
Dept: BEHAVIORAL HEALTH | Facility: CLINIC | Age: 65
End: 2025-04-23
Payer: MEDICARE

## 2025-04-30 ENCOUNTER — HOSPITAL ENCOUNTER (OUTPATIENT)
Dept: RADIOLOGY | Facility: CLINIC | Age: 65
Discharge: HOME | End: 2025-04-30
Payer: MEDICARE

## 2025-04-30 ENCOUNTER — OFFICE VISIT (OUTPATIENT)
Dept: ORTHOPEDIC SURGERY | Facility: CLINIC | Age: 65
End: 2025-04-30
Payer: MEDICARE

## 2025-04-30 ENCOUNTER — APPOINTMENT (OUTPATIENT)
Dept: BEHAVIORAL HEALTH | Facility: CLINIC | Age: 65
End: 2025-04-30
Payer: MEDICARE

## 2025-04-30 DIAGNOSIS — Z96.651 S/P TKR (TOTAL KNEE REPLACEMENT) USING CEMENT, RIGHT: ICD-10-CM

## 2025-04-30 DIAGNOSIS — Z96.651 S/P TKR (TOTAL KNEE REPLACEMENT) USING CEMENT, RIGHT: Primary | ICD-10-CM

## 2025-04-30 PROCEDURE — 73562 X-RAY EXAM OF KNEE 3: CPT | Mod: 50

## 2025-04-30 PROCEDURE — 1125F AMNT PAIN NOTED PAIN PRSNT: CPT | Performed by: STUDENT IN AN ORGANIZED HEALTH CARE EDUCATION/TRAINING PROGRAM

## 2025-04-30 PROCEDURE — 99024 POSTOP FOLLOW-UP VISIT: CPT | Performed by: STUDENT IN AN ORGANIZED HEALTH CARE EDUCATION/TRAINING PROGRAM

## 2025-04-30 PROCEDURE — 1159F MED LIST DOCD IN RCRD: CPT | Performed by: STUDENT IN AN ORGANIZED HEALTH CARE EDUCATION/TRAINING PROGRAM

## 2025-04-30 RX ORDER — AMOXICILLIN 500 MG/1
2000 CAPSULE ORAL ONCE
Qty: 4 CAPSULE | Refills: 3 | Status: SHIPPED | OUTPATIENT
Start: 2025-04-30 | End: 2025-05-01

## 2025-04-30 ASSESSMENT — PAIN SCALES - GENERAL: PAINLEVEL_OUTOF10: 3

## 2025-04-30 ASSESSMENT — PAIN - FUNCTIONAL ASSESSMENT: PAIN_FUNCTIONAL_ASSESSMENT: 0-10

## 2025-04-30 NOTE — PROGRESS NOTES
Orthopaedic Surgery Progress Note     DOS: 3/10/2025  Right total knee arthroplasty     Subjective: Patient presents to clinic for 6 week post-op check.  Patient is back to living at a sober living facility and is able to traverse the building including the stairs with ease.  He denies pain except after particularly challenging PT appointments.  He has made a lot of progress with PT over the past few weeks and is working diligently on quad strengthening.  No specific complaints.    Objective:   Awake and alert  Normal work of breathing     RLE:  Incision healed  Normal, painless, reciprocal gait  ROM: 0-120   Intact ehl/fhl/ta/gsc  SILT s/s/sp/dp/t  Palpable DP pulse     Imaging: Cemented PS total knee arthroplasty in good alignment, no signs of loosening.    A/P: 64-year-old male s/p right total knee arthroplasty, progressing very well.  Trip has no signs of infection, great motion, and ambulates with a nonantalgic gait.  He is very pleased.  We discussed routine dental prophylaxis and the next follow-up for 2 months.  - WBAT RLE  - Routine dental prophylaxis  - Continue PT/HEP  - Return to clinic in 2 months with x-rays     Geoffrey Urrutia MD  Department of Orthopaedic Surgery  Division of Adult Reconstruction  , [facility]

## 2025-05-07 ENCOUNTER — TREATMENT (OUTPATIENT)
Dept: PHYSICAL THERAPY | Facility: CLINIC | Age: 65
End: 2025-05-07
Payer: MEDICARE

## 2025-05-07 DIAGNOSIS — M25.561 CHRONIC KNEE PAIN AFTER TOTAL REPLACEMENT OF RIGHT KNEE JOINT: Primary | ICD-10-CM

## 2025-05-07 DIAGNOSIS — G89.29 CHRONIC KNEE PAIN AFTER TOTAL REPLACEMENT OF RIGHT KNEE JOINT: Primary | ICD-10-CM

## 2025-05-07 DIAGNOSIS — Z96.651 CHRONIC KNEE PAIN AFTER TOTAL REPLACEMENT OF RIGHT KNEE JOINT: Primary | ICD-10-CM

## 2025-05-07 PROCEDURE — 97112 NEUROMUSCULAR REEDUCATION: CPT | Mod: GP

## 2025-05-07 PROCEDURE — 97110 THERAPEUTIC EXERCISES: CPT | Mod: GP
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
## 2025-05-21 ENCOUNTER — APPOINTMENT (OUTPATIENT)
Dept: PHYSICAL THERAPY | Facility: CLINIC | Age: 65
End: 2025-05-21
Payer: MEDICARE

## 2025-05-29 ENCOUNTER — APPOINTMENT (OUTPATIENT)
Dept: BEHAVIORAL HEALTH | Facility: CLINIC | Age: 65
End: 2025-05-29
Payer: MEDICARE

## 2025-05-29 VITALS
WEIGHT: 196.8 LBS | HEART RATE: 78 BPM | DIASTOLIC BLOOD PRESSURE: 92 MMHG | HEIGHT: 72 IN | SYSTOLIC BLOOD PRESSURE: 123 MMHG | BODY MASS INDEX: 26.66 KG/M2 | RESPIRATION RATE: 18 BRPM

## 2025-05-29 DIAGNOSIS — F31.5 BIPOLAR DISORDER, CURRENT EPISODE DEPRESSED, SEVERE, WITH PSYCHOTIC FEATURES (MULTI): ICD-10-CM

## 2025-05-29 DIAGNOSIS — M17.11 ARTHRITIS OF RIGHT KNEE: ICD-10-CM

## 2025-05-29 DIAGNOSIS — G47.30 SLEEP APNEA, UNSPECIFIED TYPE: ICD-10-CM

## 2025-05-29 DIAGNOSIS — F39: ICD-10-CM

## 2025-05-29 DIAGNOSIS — G31.84 AMNESTIC MCI (MILD COGNITIVE IMPAIRMENT WITH MEMORY LOSS): ICD-10-CM

## 2025-05-29 PROCEDURE — 99205 OFFICE O/P NEW HI 60 MIN: CPT | Performed by: PSYCHIATRY & NEUROLOGY

## 2025-05-29 PROCEDURE — 3008F BODY MASS INDEX DOCD: CPT | Performed by: PSYCHIATRY & NEUROLOGY

## 2025-05-29 PROCEDURE — 1126F AMNT PAIN NOTED NONE PRSNT: CPT | Performed by: PSYCHIATRY & NEUROLOGY

## 2025-05-29 RX ORDER — OLANZAPINE 10 MG/1
10 TABLET, FILM COATED ORAL NIGHTLY
Qty: 30 TABLET | Refills: 1 | Status: SHIPPED | OUTPATIENT
Start: 2025-05-29 | End: 2025-07-28

## 2025-05-29 RX ORDER — LITHIUM CARBONATE 300 MG/1
300 CAPSULE ORAL
Qty: 60 CAPSULE | Refills: 1 | Status: SHIPPED | OUTPATIENT
Start: 2025-05-29 | End: 2025-07-28

## 2025-05-29 RX ORDER — OLANZAPINE 10 MG/1
10 TABLET, FILM COATED ORAL NIGHTLY
Qty: 30 TABLET | Refills: 1 | Status: SHIPPED | OUTPATIENT
Start: 2025-05-29 | End: 2025-05-29

## 2025-05-29 RX ORDER — OLANZAPINE 7.5 MG/1
7.5 TABLET, FILM COATED ORAL EVERY MORNING
Qty: 30 TABLET | Refills: 1 | Status: SHIPPED | OUTPATIENT
Start: 2025-05-29 | End: 2025-05-29

## 2025-05-29 RX ORDER — FLUOXETINE 20 MG/1
20 CAPSULE ORAL DAILY
Qty: 30 CAPSULE | Refills: 1 | Status: SHIPPED | OUTPATIENT
Start: 2025-05-29 | End: 2025-07-28

## 2025-05-29 RX ORDER — OLANZAPINE 7.5 MG/1
7.5 TABLET, FILM COATED ORAL EVERY MORNING
Qty: 30 TABLET | Refills: 1 | Status: SHIPPED | OUTPATIENT
Start: 2025-05-29 | End: 2025-07-28

## 2025-05-29 RX ORDER — LITHIUM CARBONATE 300 MG/1
300 CAPSULE ORAL
Qty: 60 CAPSULE | Refills: 1 | Status: SHIPPED | OUTPATIENT
Start: 2025-05-29 | End: 2025-05-29

## 2025-05-29 RX ORDER — FLUOXETINE 20 MG/1
20 CAPSULE ORAL DAILY
Qty: 30 CAPSULE | Refills: 1 | Status: SHIPPED | OUTPATIENT
Start: 2025-05-29 | End: 2025-05-29

## 2025-05-29 ASSESSMENT — MONTREAL COGNITIVE ASSESSMENT (MOCA)
4. NAME EACH OF THE THREE ANIMALS SHOWN: 3
WHAT LEVEL OF EDUCATION WAS ATTAINED: 0
WHAT IS THE TOTAL SCORE (OUT OF 30): 19
VISUOSPATIAL/EXECUTIVE SUBSCORE: 2
8. SERIAL SUBTRACTION OF 7S: 1
7. [VIGILENCE] TAP WHEN HEARING DESIGNATED LETTER: 1
5. MEMORY TRIALS: 0
12. MEMORY INDEX SCORE: 3
13. ORIENTATION SUBSCORE: 6
6. READ LIST OF DIGITS [FORWARD/BACKWARD]: 2
9. REPEAT EACH SENTENCE: 1
11. FOR EACH PAIR OF WORDS, WHAT CATEGORY DO THEY BELONG TO (OUT OF 2): 0
10. [FLUENCY] NAME WORDS STARTING WITH DESIGNATED LETTER: 0

## 2025-05-29 ASSESSMENT — ENCOUNTER SYMPTOMS
HALLUCINATIONS: 1
NERVOUS/ANXIOUS: 1
DYSPHORIC MOOD: 1
DELUSIONS: 1
FORGETFULNESS: 1

## 2025-05-29 ASSESSMENT — PAIN SCALES - GENERAL: PAINLEVEL_OUTOF10: 0-NO PAIN

## 2025-05-29 NOTE — PROGRESS NOTES
[facility] Nurse Note    Name: Trip Brown  MRN: [id number]  YOB: 1960    Date: 05/29/25    Reason for Visit:  No chief complaint on file.    Vitals:       Problem List[1]    Allergies[2]    Encounter Medications[3]    Progress:             [1]   Patient Active Problem List  Diagnosis    Acquired scoliosis    Acquired spondylolisthesis    Anxiety    Asthma    Asthma with COPD with exacerbation    Bipolar 2 disorder, major depressive episode (Multi)    Bipolar affective disorder, currently depressed, moderate (Multi)    Bipolar I disorder (Multi)    Cellulitis    Cervical disc disease    Angina pectoris    Chronic lumbar radiculopathy    Lumbar radiculopathy    Chronic maxillary sinusitis    Chronic obstructive pulmonary disease (Multi)    Cocaine abuse in remission    Cocaine use    Cocaine use disorder, severe, dependence (Multi)    COPD with chronic bronchitis (Multi)    Dental caries    Dependence on nicotine from cigarettes    Epidural abscess ([facility])    Epistaxis    Foot pain, bilateral    Hepatitis B carrier (Multi)    Chronic hepatitis C without hepatic coma (Multi)    History of substance dependence (Multi)    IV drug abuse    Localized, primary osteoarthritis    Severe back pain    Mild depressive disorder    Mixed simple and mucopurulent chronic bronchitis (Multi)    Neck pain, acute    Opioid abuse    Opioid use disorder, severe, dependence (Multi)    Opioid withdrawal (Multi)    Other abnormalities of gait and mobility    Abscess    Inflamed seborrheic keratosis    Hemangioma of skin and subcutaneous tissue    Paraspinal abscess (Multi)    Periapical abscess without sinus    Polysubstance abuse    Post-traumatic osteoarthritis of left knee    Primary osteoarthritis of both knees    Rash and nonspecific skin eruption    Renal insufficiency    Restless legs syndrome    Rupture of left proximal biceps tendon    Sciatic pain    Severe episode of recurrent major depressive disorder, without  psychotic features (Multi)    Severe protein-calorie malnutrition (Multi)    Sinusitis    Smoker    Substance induced mood disorder (Multi)    SVT (supraventricular tachycardia)    Tendinitis of left shoulder    Thrombocytopenia    Palpitations    Bradycardia    PARRA (dyspnea on exertion)    NSVT (nonsustained ventricular tachycardia) (Multi)    Spondylolisthesis    S/P lumbar spinal fusion    Arthritis of right knee    S/P TKR (total knee replacement) not using cement, right    Acute confusion    Chronic knee pain after total replacement of right knee joint   [2]   Allergies  Allergen Reactions    Bupropion Hallucinations   [3]   Outpatient Encounter Medications as of 5/29/2025   Medication Sig Dispense Refill    acetaminophen (Tylenol) 325 mg tablet Take 3 tablets (975 mg) by mouth every 6 hours if needed for mild pain (1 - 3).      cholecalciferol (Vitamin D-3) 25 MCG (1000 UT) tablet Take 1 tablet (25 mcg) by mouth once daily.      dilTIAZem CD (Cardizem CD) 240 mg 24 hr capsule Take 1 capsule (240 mg) by mouth once daily.      docusate sodium (Colace) 100 mg capsule Take 1 capsule (100 mg) by mouth 2 times a day.      FLUoxetine (PROzac) 40 mg capsule Take 1 capsule (40 mg) by mouth once daily. Do not fill before March 14, 2025.      fluticasone (Flonase) 50 mcg/actuation nasal spray Administer 1 spray into affected nostril(s) once daily.      gabapentin (Neurontin) 300 mg capsule TAKE 1 CAPSULE (300 MG) BY MOUTH 3 TIMES A DAY. 90 capsule 2    ipratropium-albuteroL (Duo-Neb) 0.5-2.5 mg/3 mL nebulizer solution Inhale 3 mL every 6 hours if needed.      lithium 300 mg capsule Take 1 capsule (300 mg) by mouth twice a day.      nicotine (Nicoderm CQ) 21 mg/24 hr patch Place 1 patch over 24 hours on the skin once every 24 hours. 30 patch 0    nicotine polacrilex (Commit) 4 mg lozenge Dissolve 1 lozenge (4 mg) in the mouth every 2 hours if needed for smoking cessation. 100 lozenge 0    OLANZapine (ZyPREXA) 7.5 mg  tablet Take 1 Tablet by mouth nightly at bedtime; Indications manic-depression      pantoprazole (ProtoNix) 40 mg EC tablet Take 1 tablet (40 mg) by mouth once daily in the morning. Take before meals for 20 doses. For 21 days.  Do not crush, chew, or split.      polyethylene glycol (Glycolax, Miralax) 17 gram packet Take 17 g by mouth 2 times a day as needed (for constipation).      tiotropium (Spiriva) 18 mcg inhalation capsule Place 1 capsule (18 mcg) into inhaler and inhale once daily.      tiotropium-olodateroL (Stiolto Respimat) 2.5-2.5 mcg/actuation mist inhaler Inhale 2 Inhalations once daily. 1 g 3    traZODone (Desyrel) 100 mg tablet Take 0.5 tablets (50 mg) by mouth.      Ventolin HFA 90 mcg/actuation inhaler Inhale 2 puffs every 6 hours if needed for wheezing or shortness of breath.       No facility-administered encounter medications on file as of 5/29/2025.

## 2025-05-29 NOTE — PROGRESS NOTES
Subjective   Patient ID: Trip Brown is a 64 y.o. male who presents for No chief complaint on file. I have a female demon touching me.     HPI: The patient is seen with his sister ( Peace Koenig ) throughout this appointment today. The patient reports a female demon visiting him that is friendly and not threatening. At times the patient states that at times the demon can be a bitch with her digging her nails in him at times. His sister handles his finances and is a key support person.     PPH: The patient has a long history of psychiatric hospitalizations and no  suicide attempts. The patient states he has had a prior history of mental health treatment as an outpatient going back at least 20 years. The patient claims to have a history of SAD and BAD. The patient is sober 5 years from cocaine substance misuse. He has been sober from alcohol for about 20 years and is following in AA.     Past Medical History:  No date: Anxiety  No date: Arthritis  No date: Asthma  No date: Bipolar disorder  No date: CKD (chronic kidney disease)  No date: COPD (chronic obstructive pulmonary disease) (Multi)  No date: Hepatitis B carrier (Multi)  No date: Hepatitis C  No date: History of cocaine use  No date: HL (hearing loss)  No date: Hyperlipidemia  No date: Hypertension  No date: Polysubstance abuse (Multi)  No date: RLS (restless legs syndrome)  No date: Seasonal allergies  04/2023: SVT (supraventricular tachycardia)      Comment:  s/p ablation  No date: Thrombocytopenia    FH: The patient was adopted and does not know any of his biological family history.     SH: The patient was born in [place] and denies being abused as a child. The patient has some college. The patient is  and has no offspring. The patient has worked as a garvey for about 40 years. He is now disabled and not worked for more than 15 years. The disability is based on mental health  and medical reasons with his liver, back and  respiratory according to his report. The patient lives in a sober house for over a year.     Mental Status Exam     General: In no acute distress.   Appearance: Calm  Attitude: Cooperative.   Behavior: Anxious, hallucinatory, delusional and labile features.   Motor Activity: No agitation or retardation. No EPS/TD. Normal gait and station.   Speech: Regular rate, rhythm, volume and tone, spontaneous, fluent.   Mood: Anxious, hallucinatory, delusional and labile features.   Affect: Anxious, hallucinatory, delusional and labile features.   Thought Process: Impaired.  Thought Content: Impoverished.  Thought Perception: Does endorse tactile hallucinations and delusions, does not appear to be responding to hallucinatory stimuli during the examination today.   Cognition: Alert, oriented x3. A MoCA was performed on 5/29/25:  19/30, VSE was  2/5, naming  3/3, attention was  4/6,  language was 1/3, abstraction  0/2,  memory 3/5 and orientation was  6/6.  Insight: Insight is limited.   Judgment: Judgment is limited.       Appearance: Well-groomed.   Build: Average.   Demeanor: Average.  Eye Contact: Average..   Motor Activity: Average.   Speech: Clear.   Language: Neurologic language is intact.   Fund of Knowledge: Aware of current events,~fair fund of knowledge.   Delusions: Anxious, hallucinatory, delusional and labile features.   Self Harm: None reported or evidenced.   Aggressive: None reported or evidenced.   Mood: Anxious, hallucinatory, delusional and labile features.   Affect: Full.   Orientation: Alert.   Manner: Cooperative.   Thought process: Goal-directed.   Thought association: Displays rational thought process.   Content of thought: No suicidal or homicidal ideation or plans are evidenced.   Abstract/ Rational Thought: Impaired.  Memory: Recent memory loss.  Behavior: Calm.   Attention/Concentration: Impaired.   Cognition: Impaired.   Intelligence Estimate: Average.   Executive Function: Impaired.   Insight:  Limited.  Judgement: Limited.             Review of Systems   Neurological:         Mental Status Exam     General: In no acute distress.   Appearance: Calm  Attitude: Cooperative.   Behavior: Anxious, hallucinatory, delusional and labile features.   Motor Activity: No agitation or retardation. No EPS/TD. Normal gait and station.   Speech: Regular rate, rhythm, volume and tone, spontaneous, fluent.   Mood: Anxious, hallucinatory, delusional and labile features.   Affect: Anxious, hallucinatory, delusional and labile features.   Thought Process: Impaired.  Thought Content: Impoverished.  Thought Perception: Does endorse tactile hallucinations and delusions, does not appear to be responding to hallucinatory stimuli during the examination today.   Cognition: Alert, oriented x3. A MoCA was performed on 5/29/25:  /30, VSE was  2/5, naming  3/3, attention was  4/6,  language was   1/3, abstraction  0/2,  memory 3/5 and orientation was  6/6.  Insight: Insight is limited.   Judgment: Judgment is limited.       Appearance: Well-groomed.   Build: Average.   Demeanor: Average.  Eye Contact: Average..   Motor Activity: Average.   Speech: Clear.   Language: Neurologic language is intact.   Fund of Knowledge: Aware of current events,~fair fund of knowledge.   Delusions: Anxious, hallucinatory, delusional and labile features.   Self Harm: None reported or evidenced.   Aggressive: None reported or evidenced.   Mood: Anxious, hallucinatory, delusional and labile features.   Affect: Full.   Orientation: Alert.   Manner: Cooperative.   Thought process: Goal-directed.   Thought association: Displays rational thought process.   Content of thought: No suicidal or homicidal ideation or plans are evidenced.   Abstract/ Rational Thought: Impaired.  Memory: Recent memory loss.  Behavior: Calm.   Attention/Concentration: Impaired.   Cognition: Impaired.   Intelligence Estimate: Average.   Executive Function: Impaired.   Insight:  Limited.  Judgement: Limited.     Psychiatric/Behavioral:  Positive for behavioral problems, dysphoric mood and hallucinations. The patient is nervous/anxious.         Mental Status Exam     General: In no acute distress.   Appearance: Calm  Attitude: Cooperative.   Behavior: Anxious, hallucinatory, delusional and labile features.   Motor Activity: No agitation or retardation. No EPS/TD. Normal gait and station.   Speech: Regular rate, rhythm, volume and tone, spontaneous, fluent.   Mood: Anxious, hallucinatory, delusional and labile features.   Affect: Anxious, hallucinatory, delusional and labile features.   Thought Process: Impaired.  Thought Content: Impoverished.  Thought Perception: Does endorse tactile hallucinations and delusions, does not appear to be responding to hallucinatory stimuli during the examination today.   Cognition: Alert, oriented x3. A MoCA was performed on 5/29/25:  /30, VSE was  2/5, naming  3/3, attention was  4/6,  language was   1/3, abstraction  0/2,  memory 3/5 and orientation was  6/6.  Insight: Insight is limited.   Judgment: Judgment is limited.       Appearance: Well-groomed.   Build: Average.   Demeanor: Average.  Eye Contact: Average..   Motor Activity: Average.   Speech: Clear.   Language: Neurologic language is intact.   Fund of Knowledge: Aware of current events,~fair fund of knowledge.   Delusions: Anxious, hallucinatory, delusional and labile features.   Self Harm: None reported or evidenced.   Aggressive: None reported or evidenced.   Mood: Anxious, hallucinatory, delusional and labile features.   Affect: Full.   Orientation: Alert.   Manner: Cooperative.   Thought process: Goal-directed.   Thought association: Displays rational thought process.   Content of thought: No suicidal or homicidal ideation or plans are evidenced.   Abstract/ Rational Thought: Impaired.  Memory: Recent memory loss.  Behavior: Calm.   Attention/Concentration: Impaired.   Cognition: Impaired.    Intelligence Estimate: Average.   Executive Function: Impaired.   Insight: Limited.  Judgement: Limited.     All other systems reviewed and are negative.    Psych Review of Symptoms:    ADHD:   Inattention Symptoms: Difficulty sustaining attention, difficulty with follow through, difficulty organizing and forgetfulness.       Anxiety:   Generalized Anxiety Symptoms: Difficulty controlling worry.       Developmental and Sensory Concerns: Patient denied any symptoms.         Depressive Symptoms: Patient denied any symptoms.         Disruptive and Conduct Symptoms: Patient denied any symptoms.         Eating / Feeding Concerns: Patient denied any symptoms.         Elimination Symptoms: Patient denied any symptoms.         Manic Symptoms: Patient denied any symptoms.         Obsessive-Compulsive Symptoms: Patient denied any symptoms.         Psychotic Symptoms:   Delusions and hallucinations.         Trauma Related Symptoms: Patient denied any symptoms.           Sleep Concerns: Patient denied any symptoms.             Objective   Physical Exam  Neurological:      Mental Status: He is alert.      Comments: Mental Status Exam     General: In no acute distress.   Appearance: Calm  Attitude: Cooperative.   Behavior: Anxious, hallucinatory, delusional and labile features.   Motor Activity: No agitation or retardation. No EPS/TD. Normal gait and station.   Speech: Regular rate, rhythm, volume and tone, spontaneous, fluent.   Mood: Anxious, hallucinatory, delusional and labile features.   Affect: Anxious, hallucinatory, delusional and labile features.   Thought Process: Impaired.  Thought Content: Impoverished.  Thought Perception: Does endorse tactile hallucinations and delusions, does not appear to be responding to hallucinatory stimuli during the examination today.   Cognition: Alert, oriented x3. A MoCA was performed on 5/29/25:  /30, VSE was  2/5, naming  3/3, attention was  4/6,  language was   1/3, abstraction  0/2,   memory 3/5 and orientation was  6/6.  Insight: Insight is limited.   Judgment: Judgment is limited.       Appearance: Well-groomed.   Build: Average.   Demeanor: Average.  Eye Contact: Average..   Motor Activity: Average.   Speech: Clear.   Language: Neurologic language is intact.   Fund of Knowledge: Aware of current events,~fair fund of knowledge.   Delusions: Anxious, hallucinatory, delusional and labile features.   Self Harm: None reported or evidenced.   Aggressive: None reported or evidenced.   Mood: Anxious, hallucinatory, delusional and labile features.   Affect: Full.   Orientation: Alert.   Manner: Cooperative.   Thought process: Goal-directed.   Thought association: Displays rational thought process.   Content of thought: No suicidal or homicidal ideation or plans are evidenced.   Abstract/ Rational Thought: Impaired.  Memory: Recent memory loss.  Behavior: Calm.   Attention/Concentration: Impaired.   Cognition: Impaired.   Intelligence Estimate: Average.   Executive Function: Impaired.   Insight: Limited.  Judgement: Limited.     Psychiatric:      Comments: Mental Status Exam     General: In no acute distress.   Appearance: Calm  Attitude: Cooperative.   Behavior: Anxious, hallucinatory, delusional and labile features.   Motor Activity: No agitation or retardation. No EPS/TD. Normal gait and station.   Speech: Regular rate, rhythm, volume and tone, spontaneous, fluent.   Mood: Anxious, hallucinatory, delusional and labile features.   Affect: Anxious, hallucinatory, delusional and labile features.   Thought Process: Impaired.  Thought Content: Impoverished.  Thought Perception: Does endorse tactile hallucinations and delusions, does not appear to be responding to hallucinatory stimuli during the examination today.   Cognition: Alert, oriented x3. A MoCA was performed on 5/29/25:  /30, VSE was  2/5, naming  3/3, attention was  4/6,  language was   1/3, abstraction  0/2,  memory 3/5 and orientation was   6/6.  Insight: Insight is limited.   Judgment: Judgment is limited.       Appearance: Well-groomed.   Build: Average.   Demeanor: Average.  Eye Contact: Average..   Motor Activity: Average.   Speech: Clear.   Language: Neurologic language is intact.   Fund of Knowledge: Aware of current events,~fair fund of knowledge.   Delusions: Anxious, hallucinatory, delusional and labile features.   Self Harm: None reported or evidenced.   Aggressive: None reported or evidenced.   Mood: Anxious, hallucinatory, delusional and labile features.   Affect: Full.   Orientation: Alert.   Manner: Cooperative.   Thought process: Goal-directed.   Thought association: Displays rational thought process.   Content of thought: No suicidal or homicidal ideation or plans are evidenced.   Abstract/ Rational Thought: Impaired.  Memory: Recent memory loss.  Behavior: Calm.   Attention/Concentration: Impaired.   Cognition: Impaired.   Intelligence Estimate: Average.   Executive Function: Impaired.   Insight: Limited.  Judgement: Limited.           Lab Review:   Admission on 03/10/2025, Discharged on 03/14/2025   Component Date Value    Glucose 03/10/2025 99     Sodium 03/10/2025 140     Potassium 03/10/2025 4.2     Chloride 03/10/2025 109 (H)     Bicarbonate 03/10/2025 22     Anion Gap 03/10/2025 13     Urea Nitrogen 03/10/2025 19     Creatinine 03/10/2025 1.46 (H)     eGFR 03/10/2025 53 (L)     Calcium 03/10/2025 10.0     POCT Prothrombin time 03/10/2025 12.2     POCT INR 03/10/2025 1.0     Case Report 03/10/2025                      Value:Surgical Pathology                                Case: [id number]                                  Authorizing Provider:  Geoffrey Urrutia MD            Collected:           03/10/2025 0819              Ordering Location:     [facility] OR Received:            03/10/2025 1113              Pathologist:           Edith Singh                                                                     "        MD                                                                           Specimen:    KNEE ARTHROPLASTY RIGHT, RIGHT KNEE BONE AND TISSUE                                        FINAL DIAGNOSIS 03/10/2025                      Value:  Specimen labeled \"Right knee bone and tissue\":  -- Osteoarthritis.          03/10/2025                      Value:By the signature on this report, the individual or group listed as making the Final Interpretation/Diagnosis certifies that they have reviewed this case.       Clinical History 03/10/2025                      Value:Pre-op diagnosis:  Arthritis of right knee [M17.11]      Gross Description 03/10/2025                      Value:Received in formalin, labeled with the patient's name and hospital number and \"right knee bone and tissue\", are multiple segments of bone and cartilage aggregating to 12.0 x 11.5 x 2.0 cm.  One portion is recognizable as the tibial plateau, and shows eburnation and roughening of the articular surface.  Separate fragments consistent with femoral condyles also show evidence of cartilage eburnation and roughening.  Also received in the same container are multiple irregular fragments of tan-brown tissue, resembling synovium and joint capsule aggregating to 3.4 x 3.0 x 0.6 cm.  Representative sections are submitted in 2 cassettes following decalcification.   MRS    Summary of Cassettes:  Specimen Label Site  A  1 soft tissue    2 Bone        WBC 03/11/2025 12.6 (H)     nRBC 03/11/2025 0.0     RBC 03/11/2025 4.22 (L)     Hemoglobin 03/11/2025 14.0     Hematocrit 03/11/2025 42.9     MCV 03/11/2025 102 (H)     MCH 03/11/2025 33.2     MCHC 03/11/2025 32.6     RDW 03/11/2025 12.6     Platelets 03/11/2025 155     Glucose 03/11/2025 134 (H)     Sodium 03/11/2025 137     Potassium 03/11/2025 4.3     Chloride 03/11/2025 106     Bicarbonate 03/11/2025 23     Anion Gap 03/11/2025 12     Urea Nitrogen 03/11/2025 26 (H)     Creatinine 03/11/2025 1.77 (H)     " eGFR 03/11/2025 42 (L)     Calcium 03/11/2025 9.1     Glucose 03/11/2025 132 (H)     Sodium 03/11/2025 136     Potassium 03/11/2025 4.2     Chloride 03/11/2025 107     Bicarbonate 03/11/2025 21     Anion Gap 03/11/2025 12     Urea Nitrogen 03/11/2025 25 (H)     Creatinine 03/11/2025 1.62 (H)     eGFR 03/11/2025 47 (L)     Calcium 03/11/2025 8.9     Glucose 03/12/2025 131 (H)     Sodium 03/12/2025 135 (L)     Potassium 03/12/2025 4.7     Chloride 03/12/2025 108 (H)     Bicarbonate 03/12/2025 16 (L)     Anion Gap 03/12/2025 16     Urea Nitrogen 03/12/2025 19     Creatinine 03/12/2025 1.32 (H)     eGFR 03/12/2025 60 (L)     Calcium 03/12/2025 9.1     Extra Tube 03/12/2025 Hold for add-ons.     Ventricular Rate 03/12/2025 86     Atrial Rate 03/12/2025 86     WV Interval 03/12/2025 174     QRS Duration 03/12/2025 86     QT Interval 03/12/2025 354     QTC Calculation(Bazett) 03/12/2025 423     P Axis 03/12/2025 49     R Axis 03/12/2025 -2     T [place] 03/12/2025 74     QRS Count 03/12/2025 13     Q Onset 03/12/2025 225     P Onset 03/12/2025 138     P Offset 03/12/2025 191     T Offset 03/12/2025 402     QTC [place] 03/12/2025 399     POCT Glucose 03/12/2025 177 (H)     Magnesium 03/12/2025 1.77     Magnesium 03/11/2025 1.62     Glucose 03/13/2025 123 (H)     Sodium 03/13/2025 137     Potassium 03/13/2025 4.3     Chloride 03/13/2025 108 (H)     Bicarbonate 03/13/2025 19 (L)     Anion Gap 03/13/2025 14     Urea Nitrogen 03/13/2025 18     Creatinine 03/13/2025 1.14     eGFR 03/13/2025 71     Calcium 03/13/2025 9.1     Methylmalonic Acid, S 03/13/2025 0.30     Folate, Serum 03/13/2025 10.1     Syphilis Total Ab 03/13/2025 Nonreactive     Vitamin B12 03/13/2025 264     Lithium 03/13/2025 0.88    Pre-Admission Testing on 03/03/2025   Component Date Value    Protime 03/03/2025 11.7     INR 03/03/2025 1.1     aPTT 03/03/2025 32     Glucose 03/03/2025 106 (H)     Sodium 03/03/2025 140     Potassium 03/03/2025 4.2     Chloride  03/03/2025 109 (H)     Bicarbonate 03/03/2025 22     Anion Gap 03/03/2025 13     Urea Nitrogen 03/03/2025 12     Creatinine 03/03/2025 1.42 (H)     eGFR 03/03/2025 55 (L)     Calcium 03/03/2025 9.2    Pre-Admission Testing on 02/26/2025   Component Date Value    Staph/MRSA Screen Culture 02/26/2025 No Staphylococcus aureus isolated     Glucose 02/26/2025 94     Sodium 02/26/2025 137     Potassium 02/26/2025 6.4 (HH)     Chloride 02/26/2025 109 (H)     Bicarbonate 02/26/2025 20 (L)     Anion Gap 02/26/2025 14     Urea Nitrogen 02/26/2025 12     Creatinine 02/26/2025 1.34 (H)     eGFR 02/26/2025 59 (L)     Calcium 02/26/2025 9.8        Assessment/Plan   Psychiatric Risk Assessment  Violence Risk Assessment: none  Acute Risk of Harm to Others is Considered: low   Suicide Risk Assessment: age > 65 yrs old and   Protective Factors against Suicide: adherence to  treatment, fear of suicide, moral objections to suicide, positive family relationships, and sense of responsibility toward family  Acute Risk of Harm to Self is Considered: low    Imminent Risk of Suicide or Serious Self-Injury: Low   Chronic Risk of Suicide of Serious Self-Injury: Low  Risk factors: Age, depression history and   Protective factors: Denies current suicidal ideation, denies history of suicide attempts , willingness to seek help and support , gender, access to a variety of clinical interventions , and receiving and engaged in care for mental, physical, and substance use disorders      Imminent Risk of Violence or Homicide: Low   Risk Factors: No significant risk factors identified on screening  Protective Factors: Lack of known history of harm to others , Lack of known history of violent ideation , and lack of known access to firearms.     Assessment & Plan    Diagnosis: psychotic mood disorder with delusions and hallucinations, MCI possible dementia.    Treatment Plan:   The FDA risks, benefits & alternatives to the medications prescribed  were explained to you and your support person, his sister Peace, today. You & your support person were able to understand & repeat these risks, benefits & alternatives to these prescribed medications. You and your support person have agreed to proceed with treatment with the medications discussed based on the conclusion that the benefit outweighs the risks of this treatment regimen: continue fluoxetine 20 mg for now, continue lithium 300 mg twice daily, change olanzapine to 10 mg nightly and continue 7.5 mg in the morning.  Complete neurocognitive work up with brain CT, EKG, CBC with differential, renal panel, TSH, B12  and folate.   Follow up in 4 to 6 weeks      Time:   Prep time on date of the patient encounter: 5 minutes.   Time spent directly with patient/family/caregiver: 60 minutes.   Additional time spent on patient care activities:  minutes.   Documentation time: 5 minutes.   Total time on date of patient encounter: 70 minutes.

## 2025-06-04 ENCOUNTER — APPOINTMENT (OUTPATIENT)
Dept: PHYSICAL THERAPY | Facility: CLINIC | Age: 65
End: 2025-06-04
Payer: MEDICARE

## 2025-06-06 ENCOUNTER — DOCUMENTATION (OUTPATIENT)
Dept: PHYSICAL THERAPY | Facility: CLINIC | Age: 65
End: 2025-06-06
Payer: MEDICARE

## 2025-06-06 NOTE — PROGRESS NOTES
Physical Therapy                 Therapy Communication Note    Patient Name: Trip Brown  MRN: [id number]  Department:   Room: Room/bed info not found  Today's Date: 6/6/2025     Discipline: Physical Therapy    Missed Visit:       Missed Visit Reason:      Missed Time: Cancel    Comment: pt cancelled last scheduled appt and has not returned. Has not been seen for 1 month. He was doing well at time of last visit with AROM knee R 0-135 and amb ind w/o device/reciprocal stairs. See 5/7/25 note for last reasmt.He will be discharged from PT services at this time with no final reasmt available for report period 4/2/25-5/7/25

## 2025-06-17 ENCOUNTER — HOSPITAL ENCOUNTER (OUTPATIENT)
Dept: RADIOLOGY | Facility: HOSPITAL | Age: 65
Discharge: HOME | End: 2025-06-17
Payer: MEDICARE

## 2025-06-17 DIAGNOSIS — F39: ICD-10-CM

## 2025-06-17 DIAGNOSIS — G31.84 AMNESTIC MCI (MILD COGNITIVE IMPAIRMENT WITH MEMORY LOSS): ICD-10-CM

## 2025-06-17 PROCEDURE — 70450 CT HEAD/BRAIN W/O DYE: CPT

## 2025-06-17 PROCEDURE — 70450 CT HEAD/BRAIN W/O DYE: CPT | Performed by: RADIOLOGY

## 2025-06-18 LAB
ALBUMIN SERPL-MCNC: 4.3 G/DL (ref 3.6–5.1)
BASOPHILS # BLD AUTO: 28 CELLS/UL (ref 0–200)
BASOPHILS NFR BLD AUTO: 0.5 %
BUN SERPL-MCNC: 16 MG/DL (ref 7–25)
BUN/CREAT SERPL: ABNORMAL (CALC) (ref 6–22)
CALCIUM SERPL-MCNC: 9.5 MG/DL (ref 8.6–10.3)
CHLORIDE SERPL-SCNC: 107 MMOL/L (ref 98–110)
CO2 SERPL-SCNC: 21 MMOL/L (ref 20–32)
CREAT SERPL-MCNC: 1.26 MG/DL (ref 0.7–1.35)
EGFRCR SERPLBLD CKD-EPI 2021: 63 ML/MIN/1.73M2
EOSINOPHIL # BLD AUTO: 110 CELLS/UL (ref 15–500)
EOSINOPHIL NFR BLD AUTO: 2 %
ERYTHROCYTE [DISTWIDTH] IN BLOOD BY AUTOMATED COUNT: 13.4 % (ref 11–15)
FOLATE SERPL-MCNC: 2.3 NG/ML
GLUCOSE SERPL-MCNC: 107 MG/DL (ref 65–99)
HCT VFR BLD AUTO: 48 % (ref 38.5–50)
HGB BLD-MCNC: 15.7 G/DL (ref 13.2–17.1)
LITHIUM SERPL-SCNC: 0.7 MMOL/L (ref 0.6–1.2)
LYMPHOCYTES # BLD AUTO: 1551 CELLS/UL (ref 850–3900)
LYMPHOCYTES NFR BLD AUTO: 28.2 %
MCH RBC QN AUTO: 32.5 PG (ref 27–33)
MCHC RBC AUTO-ENTMCNC: 32.7 G/DL (ref 32–36)
MCV RBC AUTO: 99.4 FL (ref 80–100)
MONOCYTES # BLD AUTO: 402 CELLS/UL (ref 200–950)
MONOCYTES NFR BLD AUTO: 7.3 %
NEUTROPHILS # BLD AUTO: 3410 CELLS/UL (ref 1500–7800)
NEUTROPHILS NFR BLD AUTO: 62 %
PHOSPHATE SERPL-MCNC: 3.7 MG/DL (ref 2.1–4.3)
PLATELET # BLD AUTO: 186 THOUSAND/UL (ref 140–400)
PMV BLD REES-ECKER: 10.3 FL (ref 7.5–12.5)
POTASSIUM SERPL-SCNC: 4.7 MMOL/L (ref 3.5–5.3)
RBC # BLD AUTO: 4.83 MILLION/UL (ref 4.2–5.8)
SODIUM SERPL-SCNC: 137 MMOL/L (ref 135–146)
TSH SERPL-ACNC: 2.32 MIU/L (ref 0.4–4.5)
VIT B12 SERPL-MCNC: 250 PG/ML (ref 200–1100)
WBC # BLD AUTO: 5.5 THOUSAND/UL (ref 3.8–10.8)

## 2025-06-19 ENCOUNTER — APPOINTMENT (OUTPATIENT)
Dept: ORTHOPEDIC SURGERY | Facility: CLINIC | Age: 65
End: 2025-06-19
Payer: MEDICARE

## 2025-06-20 ENCOUNTER — HOSPITAL ENCOUNTER (EMERGENCY)
Facility: HOSPITAL | Age: 65
Discharge: HOME | End: 2025-06-20
Attending: STUDENT IN AN ORGANIZED HEALTH CARE EDUCATION/TRAINING PROGRAM
Payer: MEDICARE

## 2025-06-20 ENCOUNTER — APPOINTMENT (OUTPATIENT)
Dept: RADIOLOGY | Facility: HOSPITAL | Age: 65
End: 2025-06-20
Payer: MEDICARE

## 2025-06-20 VITALS
WEIGHT: 196 LBS | BODY MASS INDEX: 26.55 KG/M2 | SYSTOLIC BLOOD PRESSURE: 157 MMHG | OXYGEN SATURATION: 95 % | HEART RATE: 58 BPM | DIASTOLIC BLOOD PRESSURE: 91 MMHG | TEMPERATURE: 97.7 F | RESPIRATION RATE: 20 BRPM | HEIGHT: 72 IN

## 2025-06-20 DIAGNOSIS — K92.1 BLACK STOOL: Primary | ICD-10-CM

## 2025-06-20 LAB
ALBUMIN SERPL BCP-MCNC: 4.1 G/DL (ref 3.4–5)
ALP SERPL-CCNC: 68 U/L (ref 33–136)
ALT SERPL W P-5'-P-CCNC: 6 U/L (ref 10–52)
ANION GAP SERPL CALC-SCNC: 12 MMOL/L (ref 10–20)
APTT PPP: 30 SECONDS (ref 26–36)
AST SERPL W P-5'-P-CCNC: 14 U/L (ref 9–39)
BASOPHILS # BLD AUTO: 0.04 X10*3/UL (ref 0–0.1)
BASOPHILS NFR BLD AUTO: 0.4 %
BILIRUB SERPL-MCNC: 0.6 MG/DL (ref 0–1.2)
BUN SERPL-MCNC: 21 MG/DL (ref 6–23)
CALCIUM SERPL-MCNC: 9.7 MG/DL (ref 8.6–10.3)
CHLORIDE SERPL-SCNC: 106 MMOL/L (ref 98–107)
CO2 SERPL-SCNC: 21 MMOL/L (ref 21–32)
CREAT SERPL-MCNC: 1.39 MG/DL (ref 0.5–1.3)
EGFRCR SERPLBLD CKD-EPI 2021: 56 ML/MIN/1.73M*2
EOSINOPHIL # BLD AUTO: 0.14 X10*3/UL (ref 0–0.7)
EOSINOPHIL NFR BLD AUTO: 1.4 %
ERYTHROCYTE [DISTWIDTH] IN BLOOD BY AUTOMATED COUNT: 13.3 % (ref 11.5–14.5)
GLUCOSE SERPL-MCNC: 86 MG/DL (ref 74–99)
HCT VFR BLD AUTO: 45.9 % (ref 41–52)
HGB BLD-MCNC: 15.1 G/DL (ref 13.5–17.5)
IMM GRANULOCYTES # BLD AUTO: 0.03 X10*3/UL (ref 0–0.7)
IMM GRANULOCYTES NFR BLD AUTO: 0.3 % (ref 0–0.9)
INR PPP: 1.1 (ref 0.9–1.1)
LACTATE SERPL-SCNC: 0.7 MMOL/L (ref 0.4–2)
LIPASE SERPL-CCNC: 36 U/L (ref 9–82)
LYMPHOCYTES # BLD AUTO: 1.78 X10*3/UL (ref 1.2–4.8)
LYMPHOCYTES NFR BLD AUTO: 17.4 %
MCH RBC QN AUTO: 32.5 PG (ref 26–34)
MCHC RBC AUTO-ENTMCNC: 32.9 G/DL (ref 32–36)
MCV RBC AUTO: 99 FL (ref 80–100)
MONOCYTES # BLD AUTO: 0.79 X10*3/UL (ref 0.1–1)
MONOCYTES NFR BLD AUTO: 7.7 %
NEUTROPHILS # BLD AUTO: 7.46 X10*3/UL (ref 1.2–7.7)
NEUTROPHILS NFR BLD AUTO: 72.8 %
NRBC BLD-RTO: 0 /100 WBCS (ref 0–0)
PLATELET # BLD AUTO: 184 X10*3/UL (ref 150–450)
POTASSIUM SERPL-SCNC: 4.7 MMOL/L (ref 3.5–5.3)
PROT SERPL-MCNC: 6.2 G/DL (ref 6.4–8.2)
PROTHROMBIN TIME: 11.8 SECONDS (ref 9.8–12.4)
RBC # BLD AUTO: 4.64 X10*6/UL (ref 4.5–5.9)
SODIUM SERPL-SCNC: 134 MMOL/L (ref 136–145)
WBC # BLD AUTO: 10.2 X10*3/UL (ref 4.4–11.3)

## 2025-06-20 PROCEDURE — 74177 CT ABD & PELVIS W/CONTRAST: CPT

## 2025-06-20 PROCEDURE — 85025 COMPLETE CBC W/AUTO DIFF WBC: CPT | Performed by: NURSE PRACTITIONER

## 2025-06-20 PROCEDURE — 96360 HYDRATION IV INFUSION INIT: CPT | Mod: 59

## 2025-06-20 PROCEDURE — 84075 ASSAY ALKALINE PHOSPHATASE: CPT | Performed by: NURSE PRACTITIONER

## 2025-06-20 PROCEDURE — 74177 CT ABD & PELVIS W/CONTRAST: CPT | Performed by: STUDENT IN AN ORGANIZED HEALTH CARE EDUCATION/TRAINING PROGRAM

## 2025-06-20 PROCEDURE — 99285 EMERGENCY DEPT VISIT HI MDM: CPT | Mod: 25 | Performed by: STUDENT IN AN ORGANIZED HEALTH CARE EDUCATION/TRAINING PROGRAM

## 2025-06-20 PROCEDURE — 2500000004 HC RX 250 GENERAL PHARMACY W/ HCPCS (ALT 636 FOR OP/ED): Performed by: STUDENT IN AN ORGANIZED HEALTH CARE EDUCATION/TRAINING PROGRAM

## 2025-06-20 PROCEDURE — 2550000001 HC RX 255 CONTRASTS: Performed by: STUDENT IN AN ORGANIZED HEALTH CARE EDUCATION/TRAINING PROGRAM

## 2025-06-20 PROCEDURE — 94640 AIRWAY INHALATION TREATMENT: CPT

## 2025-06-20 PROCEDURE — 2500000002 HC RX 250 W HCPCS SELF ADMINISTERED DRUGS (ALT 637 FOR MEDICARE OP, ALT 636 FOR OP/ED): Performed by: STUDENT IN AN ORGANIZED HEALTH CARE EDUCATION/TRAINING PROGRAM

## 2025-06-20 PROCEDURE — 83605 ASSAY OF LACTIC ACID: CPT | Performed by: NURSE PRACTITIONER

## 2025-06-20 PROCEDURE — 83690 ASSAY OF LIPASE: CPT | Performed by: NURSE PRACTITIONER

## 2025-06-20 PROCEDURE — 36415 COLL VENOUS BLD VENIPUNCTURE: CPT | Performed by: NURSE PRACTITIONER

## 2025-06-20 PROCEDURE — 85610 PROTHROMBIN TIME: CPT | Performed by: NURSE PRACTITIONER

## 2025-06-20 RX ORDER — PANTOPRAZOLE SODIUM 40 MG/10ML
80 INJECTION, POWDER, LYOPHILIZED, FOR SOLUTION INTRAVENOUS ONCE
Status: DISCONTINUED | OUTPATIENT
Start: 2025-06-20 | End: 2025-06-20

## 2025-06-20 RX ORDER — IPRATROPIUM BROMIDE AND ALBUTEROL SULFATE 2.5; .5 MG/3ML; MG/3ML
3 SOLUTION RESPIRATORY (INHALATION) ONCE
Status: COMPLETED | OUTPATIENT
Start: 2025-06-20 | End: 2025-06-20

## 2025-06-20 RX ADMIN — IOHEXOL 75 ML: 350 INJECTION, SOLUTION INTRAVENOUS at 20:22

## 2025-06-20 RX ADMIN — IPRATROPIUM BROMIDE AND ALBUTEROL SULFATE 3 ML: .5; 3 SOLUTION RESPIRATORY (INHALATION) at 20:42

## 2025-06-20 RX ADMIN — SODIUM CHLORIDE 1000 ML: 0.9 INJECTION, SOLUTION INTRAVENOUS at 20:38

## 2025-06-20 ASSESSMENT — PAIN - FUNCTIONAL ASSESSMENT: PAIN_FUNCTIONAL_ASSESSMENT: 0-10

## 2025-06-20 ASSESSMENT — PAIN SCALES - GENERAL: PAINLEVEL_OUTOF10: 0 - NO PAIN

## 2025-06-20 NOTE — ED PROVIDER NOTES
Emergency Department Provider Note        History of Present Illness     History provided by: Patient  Limitations to History: None    HPI:  Trip Brown is a 64 y.o. male past ministry of bipolar disorder, hepatitis B and hepatitis C, presents to the emergency department today due to rectal bleeding.  Patient states about a week and a half ago he started having some dark stools.  Then the stools became more coffee-ground.  Now he is having intermittent bright red rectal bleeding.  Patient denies any history of cirrhosis or esophageal varices.  He denies drinking history.  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  He denies any fevers or chills.  He does endorse some constipation.  He denies any nausea or vomiting.  Denies any chest pain or new shortness of breath.    Physical Exam   Triage vitals:  T 36.5 °C (97.7 °F)  HR 65  /83  RR 18  O2 95 % None (Room air)    Physical Exam  Vitals and nursing note reviewed.   Constitutional:       General: He is not in acute distress.     Appearance: He is well-developed.   HENT:      Head: Normocephalic and atraumatic.   Eyes:      Conjunctiva/sclera: Conjunctivae normal.   Cardiovascular:      Rate and Rhythm: Normal rate and regular rhythm.      Heart sounds: No murmur heard.  Pulmonary:      Effort: Pulmonary effort is normal. No respiratory distress.      Breath sounds: Normal breath sounds.   Abdominal:      Palpations: Abdomen is soft.      Tenderness: There is no abdominal tenderness.   Musculoskeletal:         General: No swelling.      Cervical back: Neck supple.   Skin:     General: Skin is warm and dry.      Capillary Refill: Capillary refill takes less than 2 seconds.   Neurological:      Mental Status: He is alert.   Psychiatric:         Mood and Affect: Mood normal.          Medical Decision Making & ED Course   Medical Decision Makin y.o. male past ministry of bipolar disorder, hepatitis B and hepatitis C, presents to  the emergency department today due to rectal bleeding.  Patient states about a week and a half ago he started having some dark stools.  Then the stools became more coffee-ground.  Now he is having intermittent bright red rectal bleeding.  Patient denies any history of cirrhosis or esophageal varices.  He denies drinking history.  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  He denies any fevers or chills.  He does endorse some constipation.  He denies any nausea or vomiting.  Denies any chest pain or new shortness of breath.  Plan obtaining CT imaging as his CT wind blew during obtainment of the CT scan.  Also delay in receiving his Protonix.  Patient's lab work is essentially stable, coagulation screen is normal.  Lactate is not elevated.  CMP shows slightly low creatinine which is consistent with his chronic kidney disease, no electrolyte abnormality or liver pathology.  Lipase is not elevated.  CBC shows no leukocytosis, anemia, or thrombocytopenia.  ----    Differential diagnoses considered include but are not limited to: GI bleed, bleeding hemorrhoid, anal fissure, liver cirrhosis.     Social Determinants of Health which Significantly Impact Care: None identified     EKG Independent Interpretation: EKG not obtained    Independent Result Review and Interpretation: Relevant laboratory and radiographic results were reviewed and independently interpreted by myself.  As necessary, they are commented on in the ED Course.    Chronic conditions affecting the patient's care: As documented above in [facility]    The patient was discussed with the following consultants/services: None    Care Considerations: As documented above in [facility]    ED Course:  ED Course as of 06/20/25 1957 Fri Jun 20, 2025   1740 Coagulation Screen  Normal [WS]   1740 CBC and Auto Differential  CBC stable, no leukocytosis, anemia, normal platelets [WS]   1752 Lactate  Not elevated [WS]   1753 Lipase  Normal [WS]   1753 Comprehensive  Metabolic Panel(!)  No electrolyte abnormalities, no liver pathology, creatinine elevated around his baseline. [WS]      ED Course User Index  [WS] EMILIE Sharif     Disposition   Patient was signed out to Dr. Omar MD at 200 pending completion of their work-up.  Please see the next provider's transition of care note for the remainder of the patient's care.     Procedures   Procedures    This was a shared visit with an ED attending.  The patient was seen and discussed with the ED attending    EMILIE Sharif  Emergency Medicine     EMILIE Sharif  06/1960

## 2025-06-25 ENCOUNTER — HOSPITAL ENCOUNTER (OUTPATIENT)
Dept: RADIOLOGY | Facility: CLINIC | Age: 65
Discharge: HOME | End: 2025-06-25
Payer: MEDICARE

## 2025-06-25 ENCOUNTER — APPOINTMENT (OUTPATIENT)
Dept: ORTHOPEDIC SURGERY | Facility: CLINIC | Age: 65
End: 2025-06-25
Payer: MEDICARE

## 2025-06-25 DIAGNOSIS — Z96.651 S/P TKR (TOTAL KNEE REPLACEMENT) USING CEMENT, RIGHT: ICD-10-CM

## 2025-06-25 DIAGNOSIS — G89.29 CHRONIC PAIN OF LEFT KNEE: Primary | ICD-10-CM

## 2025-06-25 DIAGNOSIS — M25.562 CHRONIC PAIN OF LEFT KNEE: Primary | ICD-10-CM

## 2025-06-25 PROCEDURE — 73564 X-RAY EXAM KNEE 4 OR MORE: CPT | Mod: BILATERAL PROCEDURE | Performed by: RADIOLOGY

## 2025-06-25 PROCEDURE — 20610 DRAIN/INJ JOINT/BURSA W/O US: CPT | Performed by: STUDENT IN AN ORGANIZED HEALTH CARE EDUCATION/TRAINING PROGRAM

## 2025-06-25 PROCEDURE — 99214 OFFICE O/P EST MOD 30 MIN: CPT | Performed by: STUDENT IN AN ORGANIZED HEALTH CARE EDUCATION/TRAINING PROGRAM

## 2025-06-25 PROCEDURE — 73564 X-RAY EXAM KNEE 4 OR MORE: CPT | Mod: 50

## 2025-06-25 RX ORDER — LIDOCAINE HYDROCHLORIDE 10 MG/ML
4 INJECTION, SOLUTION INFILTRATION; PERINEURAL
Status: COMPLETED | OUTPATIENT
Start: 2025-06-25 | End: 2025-06-25

## 2025-06-25 RX ORDER — TRIAMCINOLONE ACETONIDE 40 MG/ML
40 INJECTION, SUSPENSION INTRA-ARTICULAR; INTRAMUSCULAR
Status: COMPLETED | OUTPATIENT
Start: 2025-06-25 | End: 2025-06-25

## 2025-06-25 RX ADMIN — LIDOCAINE HYDROCHLORIDE 4 ML: 10 INJECTION, SOLUTION INFILTRATION; PERINEURAL at 11:47

## 2025-06-25 RX ADMIN — TRIAMCINOLONE ACETONIDE 40 MG: 40 INJECTION, SUSPENSION INTRA-ARTICULAR; INTRAMUSCULAR at 11:47

## 2025-06-25 NOTE — PROGRESS NOTES
Patient ID: Trip Brown is a 64 y.o. male.    L Inj/Asp: L knee on 6/25/2025 11:47 AM  Indications: pain and joint swelling  Details: 22 G needle, anterolateral approach  Medications: 40 mg triamcinolone acetonide 40 mg/mL; 4 mL lidocaine 10 mg/mL (1 %)  Outcome: tolerated well, no immediate complications    Discussion:  I discussed the conservative treatment options for knee osteoarthritis including but not limited to physical therapy, oral NSAIDS, activity and lifestyle modification, and corticosteroid injections. Pt has elected to undergo a cortisone injection today. I have explained the risk and benefits of an injection including the possibility of joint infection, bleeding, damage to cartilage, allergic reaction. Patient verbalized understanding and gave verbal consent wishes to proceed with a intra-articular cortisone injection for their knee.    Procedure:  After discussing the risk and benefits of the procedure, we proceeded with an intra-articular left knee injection.    With the patient's informed verbal consent, the left knee was prepped in standard sterile fashion with Chlorhexidine. The skin was then anesthetized with ethyl chloride spray and cleaned again with Chlorhexidine. The knee was then apirated/injected with a prefilled 20-gauge syringe of 40 mg Kenalog + 4 ml Lidocaine using the lateral approach without complications.  The patient tolerated this well and felt immediate initial relief of symptoms. A bandaid was applied and the patient ambulated out of the clinic on ther own accord without difficulty. Patient was instructed to avoid physical activity for 24-48 hours to prevent the knees from swelling and may ice the knees as tolerated. Patient should contact the office if any signs of of infection appear: redness, fever, chills, drainage, swelling or warmth to the knees.  Pt understands that the injections can be repeated no sooner than 3 months.  Procedure, treatment alternatives, risks  and benefits explained, specific risks discussed. Consent was given by the patient. Immediately prior to procedure a time out was called to verify the correct patient, procedure, equipment, support staff and site/side marked as required. Patient was prepped and draped in the usual sterile fashion.

## 2025-06-25 NOTE — PROGRESS NOTES
Orthopaedic Surgery Progress Note     DOS: 3/10/2025  Right total knee arthroplasty     Subjective: Patient presents to clinic for 3 month post-op check. Trip reports doing very well. He is living comfortably at sober living. He has resumed riding his motorcycle, and he's having a good summer. His left knee has become quite symptomatic lately. He is having pain with traversing stairs and after riding. He would like to proceed with a knee injection and is thinking about surgery this fall.     Objective:   Awake and alert  Normal work of breathing     RLE:  Incision healed  Normal, painless, reciprocal gait  Coronal and sagittal stability in full extension and mid-flexion  Normal Lachman  ROM: 0-125  Intact ta/gsc    LLE:  Varus alignment  Pain with palpation at medial joint line  ROM: 5-115 degrees  Coronal and sagittal stability in full extension and mid-flexion  Normal Lachman  Intact ta/gsc    Imaging: Cemented PS total knee arthroplasty in good alignment, no signs of loosening. Left knee osteoarthritis with joint space narrowing and subchondral sclerosis. Varus alignment.    A/P: 64-year-old male s/p right total knee arthroplasty, progressing very well.  Trip is progressing very well regarding the right knee.  He opted for a steroid injection for the left knee today and is considering surgery for this fall. We also discussed strategies for limiting narcotic usage after surgery. He says the oxycodone triggered feelings of addiction last time. I placed a pain medicine referral for help with any strategies for reducing need for narcotics after surgery.   - WBAT RLE  - Routine dental prophylaxis  - Continue HEP  - Return to clinic in 3 months     Geoffrey Urrutia MD  Department of Orthopaedic Surgery  Division of Adult Reconstruction  , [facility]

## 2025-06-25 NOTE — PROGRESS NOTES
Patient ID: Trip Brown is a 64 y.o. male.    L Inj/Asp: L knee on 6/25/2025 11:46 AM  Indications: pain and joint swelling  Details: 22 G needle, anterolateral approach  Medications: 40 mg triamcinolone acetonide 40 mg/mL; 4 mL lidocaine 10 mg/mL (1 %)  Outcome: tolerated well, no immediate complications    Discussion:  I discussed the conservative treatment options for knee osteoarthritis including but not limited to physical therapy, oral NSAIDS, activity and lifestyle modification, and corticosteroid injections. Pt has elected to undergo a cortisone injection today. I have explained the risk and benefits of an injection including the possibility of joint infection, bleeding, damage to cartilage, allergic reaction. Patient verbalized understanding and gave verbal consent wishes to proceed with a intra-articular cortisone injection for their knee.    Procedure:  After discussing the risk and benefits of the procedure, we proceeded with an intra-articular left knee injection.    With the patient's informed verbal consent, the left knee was prepped in standard sterile fashion with Chlorhexidine. The skin was then anesthetized with ethyl chloride spray and cleaned again with Chlorhexidine. The knee was then apirated/injected with a prefilled 20-gauge syringe of 40 mg Kenalog + 4 ml Lidocaine using the lateral approach without complications.  The patient tolerated this well and felt immediate initial relief of symptoms. A bandaid was applied and the patient ambulated out of the clinic on ther own accord without difficulty. Patient was instructed to avoid physical activity for 24-48 hours to prevent the knees from swelling and may ice the knees as tolerated. Patient should contact the office if any signs of of infection appear: redness, fever, chills, drainage, swelling or warmth to the knees.  Pt understands that the injections can be repeated no sooner than 3 months.  Procedure, treatment alternatives, risks  and benefits explained, specific risks discussed. Consent was given by the patient. Immediately prior to procedure a time out was called to verify the correct patient, procedure, equipment, support staff and site/side marked as required. Patient was prepped and draped in the usual sterile fashion.

## 2025-07-03 ENCOUNTER — TELEPHONE (OUTPATIENT)
Dept: SCHEDULING | Age: 65
End: 2025-07-03

## 2025-07-10 ENCOUNTER — APPOINTMENT (OUTPATIENT)
Dept: BEHAVIORAL HEALTH | Facility: CLINIC | Age: 65
End: 2025-07-10
Payer: MEDICARE

## 2025-07-10 VITALS
HEIGHT: 72 IN | WEIGHT: 191.1 LBS | SYSTOLIC BLOOD PRESSURE: 131 MMHG | DIASTOLIC BLOOD PRESSURE: 81 MMHG | RESPIRATION RATE: 18 BRPM | BODY MASS INDEX: 25.88 KG/M2 | HEART RATE: 74 BPM

## 2025-07-10 DIAGNOSIS — F39: ICD-10-CM

## 2025-07-10 DIAGNOSIS — M17.11 ARTHRITIS OF RIGHT KNEE: ICD-10-CM

## 2025-07-10 DIAGNOSIS — F31.5 BIPOLAR DISORDER, CURRENT EPISODE DEPRESSED, SEVERE, WITH PSYCHOTIC FEATURES (MULTI): ICD-10-CM

## 2025-07-10 DIAGNOSIS — G30.9 MILD COGNITIVE IMPAIRMENT (MCI) DUE TO ALZHEIMER'S DISEASE: ICD-10-CM

## 2025-07-10 DIAGNOSIS — G31.84 MILD COGNITIVE IMPAIRMENT (MCI) DUE TO ALZHEIMER'S DISEASE: ICD-10-CM

## 2025-07-10 PROCEDURE — 1159F MED LIST DOCD IN RCRD: CPT | Performed by: PSYCHIATRY & NEUROLOGY

## 2025-07-10 PROCEDURE — 1126F AMNT PAIN NOTED NONE PRSNT: CPT | Performed by: PSYCHIATRY & NEUROLOGY

## 2025-07-10 PROCEDURE — 99215 OFFICE O/P EST HI 40 MIN: CPT | Performed by: PSYCHIATRY & NEUROLOGY

## 2025-07-10 PROCEDURE — 1160F RVW MEDS BY RX/DR IN RCRD: CPT | Performed by: PSYCHIATRY & NEUROLOGY

## 2025-07-10 PROCEDURE — 3008F BODY MASS INDEX DOCD: CPT | Performed by: PSYCHIATRY & NEUROLOGY

## 2025-07-10 RX ORDER — FLUOXETINE HYDROCHLORIDE 40 MG/1
40 CAPSULE ORAL DAILY
Start: 2025-07-10 | End: 2025-09-08

## 2025-07-10 RX ORDER — OLANZAPINE 5 MG/1
5 TABLET, FILM COATED ORAL EVERY MORNING
Qty: 30 TABLET | Refills: 2 | Status: SHIPPED | OUTPATIENT
Start: 2025-07-10 | End: 2025-07-10 | Stop reason: ENTERED-IN-ERROR

## 2025-07-10 RX ORDER — OLANZAPINE 5 MG/1
5 TABLET, FILM COATED ORAL EVERY MORNING
Qty: 30 TABLET | Refills: 2 | Status: SHIPPED | OUTPATIENT
Start: 2025-07-10 | End: 2025-10-08

## 2025-07-10 ASSESSMENT — PAIN SCALES - GENERAL: PAINLEVEL_OUTOF10: 0-NO PAIN

## 2025-07-10 ASSESSMENT — ENCOUNTER SYMPTOMS: FORGETFULNESS: 1

## 2025-07-10 NOTE — ASSESSMENT & PLAN NOTE
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     Treatment Plan:   The FDA risks, benefits & alternatives to the medications prescribed were explained to you and your support person, his sister Peace, today. You & your support person were able to understand & repeat these risks, benefits & alternatives to these prescribed medications. You and your support person have agreed to proceed with treatment with the medications discussed based on the conclusion that the benefit outweighs the risks of this treatment regimen: continue fluoxetine 40 mg for now, continue lithium 300 mg twice daily, change olanzapine to 10 mg nightly and reduced to 5 mg in the morning.  We reviewed your brain CT of June 2025 which showed diffuse cortical loss, EKG had PVC's with a history of ablation, CBC with differential, renal panel, TSH, B12  and folate. Lithium level was therapeutic at 0.7 in June of 2025.   We are ordering an FDG PET Brain scan.  Follow up after the FDA PET scan.  Orders:    OLANZapine (ZyPREXA) 5 mg tablet; Take 1 tablet (5 mg) by mouth once daily in the morning.

## 2025-07-10 NOTE — PROGRESS NOTES
Subjective   Patient ID: Trip Brown is a 64 y.o. male who presents for No chief complaint on file.. I am okay.    HPI: The patient is seen with his sister ( Peace Koenig ) throughout this appointment today. The patient reports a female demon visiting him that is friendly has significantly diminished and not threatening. At times the patient states that the demon can be a bitch with her digging her nails in him at times but this has become substantially diminished. His sister handles his finances and is a key support person.      PPH: The patient has a long history of psychiatric hospitalizations and no  suicide attempts. The patient states he has had a prior history of mental health treatment as an outpatient going back at least 20 years. The patient claims to have a history of SAD and BAD. The patient is sober 5 years from cocaine substance misuse. He has been sober from alcohol for about 20 years and is following in AA.      Past Medical History:  No date: Anxiety  No date: Arthritis  No date: Asthma  No date: Bipolar disorder  No date: CKD (chronic kidney disease)  No date: COPD (chronic obstructive pulmonary disease) (Multi)  No date: Hepatitis B carrier (Multi)  No date: Hepatitis C  No date: History of cocaine use  No date: HL (hearing loss)  No date: Hyperlipidemia  No date: Hypertension  No date: Polysubstance abuse (Multi)  No date: RLS (restless legs syndrome)  No date: Seasonal allergies  04/2023: SVT (supraventricular tachycardia)      Comment:  s/p ablation  No date: Thrombocytopenia     FH: The patient was adopted and does not know any of his biological family history.      SH: The patient was born in [place] and denies being abused as a child. The patient has some college. The patient is  and has no offspring. The patient has worked as a garvey for about 40 years. He is now disabled and not worked for more than 15 years. The disability is based on mental health  and  medical reasons with his liver, back and respiratory according to his report. The patient lives in a sober house for over a year.      Mental Status Exam     General: In no acute distress.   Appearance: Calm  Attitude: Cooperative.   Behavior: Anxious, hallucinatory, delusional and labile features in substantial remission..   Motor Activity: No agitation or retardation. No EPS/TD. Normal gait and station.   Speech: Regular rate, rhythm, volume and tone, spontaneous, fluent.   Mood: Anxious, hallucinatory, delusional and labile features in substantial remission.   Affect: Anxious, hallucinatory, delusional and labile features in substantial remission.   Thought Process: Impaired.  Thought Content: Impoverished.  Thought Perception: Tactile hallucinations and delusions are in substantial remission.  Cognition: Alert, oriented x3. A MoCA was performed on 5/29/25:  19/30, VSE was  2/5, naming  3/3, attention was  4/6,  language was 1/3, abstraction  0/2,  memory 3/5 and orientation was  6/6.  Insight: Insight is limited.   Judgment: Judgment is limited.       Appearance: Well-groomed.   Build: Average.   Demeanor: Average.  Eye Contact: Average..   Motor Activity: Average.   Speech: Clear.   Language: Neurologic language is intact.   Fund of Knowledge: Aware of current events,~fair fund of knowledge.   Delusions: Anxious, hallucinatory, delusional and labile features in substantial remission.   Self Harm: None reported or evidenced.   Aggressive: None reported or evidenced.   Mood: Anxious, hallucinatory, delusional and labile features all in substantial remission.   Affect: Flat  Orientation: Alert.   Manner: Cooperative.   Thought process: Goal-directed.   Thought association: Displays rational thought process.   Content of thought: No suicidal or homicidal ideation or plans are evidenced.   Abstract/ Rational Thought: Impaired.  Memory: Recent memory loss.  Behavior: Calm.   Attention/Concentration: Impaired.    Cognition: Impaired.   Intelligence Estimate: Average.   Executive Function: Impaired.   Insight: Limited.  Judgement: Limited.        Review of Systems   Neurological:         Mental Status Exam     General: In no acute distress.   Appearance: Calm  Attitude: Cooperative.   Behavior: Anxious, hallucinatory, delusional and labile features in substantial remission..   Motor Activity: No agitation or retardation. No EPS/TD. Normal gait and station.   Speech: Regular rate, rhythm, volume and tone, spontaneous, fluent.   Mood: Anxious, hallucinatory, delusional and labile features in substantial remission.   Affect: Anxious, hallucinatory, delusional and labile features in substantial remission.   Thought Process: Impaired.  Thought Content: Impoverished.  Thought Perception: Tactile hallucinations and delusions are in substantial remission.  Cognition: Alert, oriented x3. A MoCA was performed on 5/29/25:  19/30, VSE was  2/5, naming  3/3, attention was  4/6,  language was 1/3, abstraction  0/2,  memory 3/5 and orientation was  6/6.  Insight: Insight is limited.   Judgment: Judgment is limited.       Appearance: Well-groomed.   Build: Average.   Demeanor: Average.  Eye Contact: Average..   Motor Activity: Average.   Speech: Clear.   Language: Neurologic language is intact.   Fund of Knowledge: Aware of current events,~fair fund of knowledge.   Delusions: Anxious, hallucinatory, delusional and labile features in substantial remission.   Self Harm: None reported or evidenced.   Aggressive: None reported or evidenced.   Mood: Anxious, hallucinatory, delusional and labile features all in substantial remission.   Affect: Flat  Orientation: Alert.   Manner: Cooperative.   Thought process: Goal-directed.   Thought association: Displays rational thought process.   Content of thought: No suicidal or homicidal ideation or plans are evidenced.   Abstract/ Rational Thought: Impaired.  Memory: Recent memory loss.  Behavior: Calm.    Attention/Concentration: Impaired.   Cognition: Impaired.   Intelligence Estimate: Average.   Executive Function: Impaired.   Insight: Limited.  Judgement: Limited.     Psychiatric/Behavioral:          Mental Status Exam     General: In no acute distress.   Appearance: Calm  Attitude: Cooperative.   Behavior: Anxious, hallucinatory, delusional and labile features in substantial remission..   Motor Activity: No agitation or retardation. No EPS/TD. Normal gait and station.   Speech: Regular rate, rhythm, volume and tone, spontaneous, fluent.   Mood: Anxious, hallucinatory, delusional and labile features in substantial remission.   Affect: Anxious, hallucinatory, delusional and labile features in substantial remission.   Thought Process: Impaired.  Thought Content: Impoverished.  Thought Perception: Tactile hallucinations and delusions are in substantial remission.  Cognition: Alert, oriented x3. A MoCA was performed on 5/29/25:  19/30, VSE was  2/5, naming  3/3, attention was  4/6,  language was 1/3, abstraction  0/2,  memory 3/5 and orientation was  6/6.  Insight: Insight is limited.   Judgment: Judgment is limited.       Appearance: Well-groomed.   Build: Average.   Demeanor: Average.  Eye Contact: Average..   Motor Activity: Average.   Speech: Clear.   Language: Neurologic language is intact.   Fund of Knowledge: Aware of current events,~fair fund of knowledge.   Delusions: Anxious, hallucinatory, delusional and labile features in substantial remission.   Self Harm: None reported or evidenced.   Aggressive: None reported or evidenced.   Mood: Anxious, hallucinatory, delusional and labile features all in substantial remission.   Affect: Flat  Orientation: Alert.   Manner: Cooperative.   Thought process: Goal-directed.   Thought association: Displays rational thought process.   Content of thought: No suicidal or homicidal ideation or plans are evidenced.   Abstract/ Rational Thought: Impaired.  Memory: Recent  memory loss.  Behavior: Calm.   Attention/Concentration: Impaired.   Cognition: Impaired.   Intelligence Estimate: Average.   Executive Function: Impaired.   Insight: Limited.  Judgement: Limited.     All other systems reviewed and are negative.    Psych Review of Symptoms:    ADHD:   Inattention Symptoms: Forgetfulness.       Anxiety: Patient denied any symptoms.         Developmental and Sensory Concerns: Patient denied any symptoms.         Depressive Symptoms: Patient denied any symptoms.         Disruptive and Conduct Symptoms: Patient denied any symptoms.         Eating / Feeding Concerns: Patient denied any symptoms.         Elimination Symptoms: Patient denied any symptoms.         Manic Symptoms: Patient denied any symptoms.         Obsessive-Compulsive Symptoms: Patient denied any symptoms.         Psychotic Symptoms: Patient denied any symptoms.           Trauma Related Symptoms: Patient denied any symptoms.           Sleep Concerns: Patient denied any symptoms.             Objective   Physical Exam  Neurological:      Mental Status: He is alert.      Comments: Mental Status Exam     General: In no acute distress.   Appearance: Calm  Attitude: Cooperative.   Behavior: Anxious, hallucinatory, delusional and labile features in substantial remission..   Motor Activity: No agitation or retardation. No EPS/TD. Normal gait and station.   Speech: Regular rate, rhythm, volume and tone, spontaneous, fluent.   Mood: Anxious, hallucinatory, delusional and labile features in substantial remission.   Affect: Anxious, hallucinatory, delusional and labile features in substantial remission.   Thought Process: Impaired.  Thought Content: Impoverished.  Thought Perception: Tactile hallucinations and delusions are in substantial remission.  Cognition: Alert, oriented x3. A MoCA was performed on 5/29/25:  19/30, VSE was  2/5, naming  3/3, attention was  4/6,  language was 1/3, abstraction  0/2,  memory 3/5 and orientation  was  6/6.  Insight: Insight is limited.   Judgment: Judgment is limited.       Appearance: Well-groomed.   Build: Average.   Demeanor: Average.  Eye Contact: Average..   Motor Activity: Average.   Speech: Clear.   Language: Neurologic language is intact.   Fund of Knowledge: Aware of current events,~fair fund of knowledge.   Delusions: Anxious, hallucinatory, delusional and labile features in substantial remission.   Self Harm: None reported or evidenced.   Aggressive: None reported or evidenced.   Mood: Anxious, hallucinatory, delusional and labile features all in substantial remission.   Affect: Flat  Orientation: Alert.   Manner: Cooperative.   Thought process: Goal-directed.   Thought association: Displays rational thought process.   Content of thought: No suicidal or homicidal ideation or plans are evidenced.   Abstract/ Rational Thought: Impaired.  Memory: Recent memory loss.  Behavior: Calm.   Attention/Concentration: Impaired.   Cognition: Impaired.   Intelligence Estimate: Average.   Executive Function: Impaired.   Insight: Limited.  Judgement: Limited.     Psychiatric:      Comments: Mental Status Exam     General: In no acute distress.   Appearance: Calm  Attitude: Cooperative.   Behavior: Anxious, hallucinatory, delusional and labile features in substantial remission..   Motor Activity: No agitation or retardation. No EPS/TD. Normal gait and station.   Speech: Regular rate, rhythm, volume and tone, spontaneous, fluent.   Mood: Anxious, hallucinatory, delusional and labile features in substantial remission.   Affect: Anxious, hallucinatory, delusional and labile features in substantial remission.   Thought Process: Impaired.  Thought Content: Impoverished.  Thought Perception: Tactile hallucinations and delusions are in substantial remission.  Cognition: Alert, oriented x3. A MoCA was performed on 5/29/25:  19/30, VSE was  2/5, naming  3/3, attention was  4/6,  language was 1/3, abstraction  0/2,  memory  3/5 and orientation was  6/6.  Insight: Insight is limited.   Judgment: Judgment is limited.       Appearance: Well-groomed.   Build: Average.   Demeanor: Average.  Eye Contact: Average..   Motor Activity: Average.   Speech: Clear.   Language: Neurologic language is intact.   Fund of Knowledge: Aware of current events,~fair fund of knowledge.   Delusions: Anxious, hallucinatory, delusional and labile features in substantial remission.   Self Harm: None reported or evidenced.   Aggressive: None reported or evidenced.   Mood: Anxious, hallucinatory, delusional and labile features all in substantial remission.   Affect: Flat  Orientation: Alert.   Manner: Cooperative.   Thought process: Goal-directed.   Thought association: Displays rational thought process.   Content of thought: No suicidal or homicidal ideation or plans are evidenced.   Abstract/ Rational Thought: Impaired.  Memory: Recent memory loss.  Behavior: Calm.   Attention/Concentration: Impaired.   Cognition: Impaired.   Intelligence Estimate: Average.   Executive Function: Impaired.   Insight: Limited.  Judgement: Limited.           Lab Review:   Admission on 06/20/2025, Discharged on 06/20/2025   Component Date Value    Lactate 06/20/2025 0.7     Lipase 06/20/2025 36     Glucose 06/20/2025 86     Sodium 06/20/2025 134 (L)     Potassium 06/20/2025 4.7     Chloride 06/20/2025 106     Bicarbonate 06/20/2025 21     Anion Gap 06/20/2025 12     Urea Nitrogen 06/20/2025 21     Creatinine 06/20/2025 1.39 (H)     eGFR 06/20/2025 56 (L)     Calcium 06/20/2025 9.7     Albumin 06/20/2025 4.1     Alkaline Phosphatase 06/20/2025 68     Total Protein 06/20/2025 6.2 (L)     AST 06/20/2025 14     Bilirubin, Total 06/20/2025 0.6     ALT 06/20/2025 6 (L)     WBC 06/20/2025 10.2     nRBC 06/20/2025 0.0     RBC 06/20/2025 4.64     Hemoglobin 06/20/2025 15.1     Hematocrit 06/20/2025 45.9     MCV 06/20/2025 99     MCH 06/20/2025 32.5     MCHC 06/20/2025 32.9     RDW 06/20/2025  13.3     Platelets 06/20/2025 184     Neutrophils % 06/20/2025 72.8     Immature Granulocytes %,* 06/20/2025 0.3     Lymphocytes % 06/20/2025 17.4     Monocytes % 06/20/2025 7.7     Eosinophils % 06/20/2025 1.4     Basophils % 06/20/2025 0.4     Neutrophils Absolute 06/20/2025 7.46     Immature Granulocytes Ab* 06/20/2025 0.03     Lymphocytes Absolute 06/20/2025 1.78     Monocytes Absolute 06/20/2025 0.79     Eosinophils Absolute 06/20/2025 0.14     Basophils Absolute 06/20/2025 0.04     Protime 06/20/2025 11.8     INR 06/20/2025 1.1     aPTT 06/20/2025 30    Office Visit on 05/29/2025   Component Date Value    LITHIUM 06/17/2025 0.7     GLUCOSE 06/17/2025 107 (H)     UREA NITROGEN (BUN) 06/17/2025 16     CREATININE 06/17/2025 1.26     EGFR 06/17/2025 63     BUN/CREATININE RATIO 06/17/2025 SEE NOTE:     SODIUM 06/17/2025 137     POTASSIUM 06/17/2025 4.7     CHLORIDE 06/17/2025 107     CARBON DIOXIDE 06/17/2025 21     CALCIUM 06/17/2025 9.5     PHOSPHATE (AS PHOSPHORUS) 06/17/2025 3.7     ALBUMIN 06/17/2025 4.3     WHITE BLOOD CELL COUNT 06/17/2025 5.5     RED BLOOD CELL COUNT 06/17/2025 4.83     HEMOGLOBIN 06/17/2025 15.7     HEMATOCRIT 06/17/2025 48.0     MCV 06/17/2025 99.4     MCH 06/17/2025 32.5     MCHC 06/17/2025 32.7     RDW 06/17/2025 13.4     PLATELET COUNT 06/17/2025 186     MPV 06/17/2025 10.3     ABSOLUTE NEUTROPHILS 06/17/2025 3,410     ABSOLUTE LYMPHOCYTES 06/17/2025 1,551     ABSOLUTE MONOCYTES 06/17/2025 402     ABSOLUTE EOSINOPHILS 06/17/2025 110     ABSOLUTE BASOPHILS 06/17/2025 28     NEUTROPHILS 06/17/2025 62     LYMPHOCYTES 06/17/2025 28.2     MONOCYTES 06/17/2025 7.3     EOSINOPHILS 06/17/2025 2.0     BASOPHILS 06/17/2025 0.5     VITAMIN B12 06/17/2025 250     FOLATE, SERUM 06/17/2025 2.3 (L)     TSH W/REFLEX TO FT4 06/17/2025 2.32    Admission on 03/10/2025, Discharged on 03/14/2025   Component Date Value    Glucose 03/10/2025 99     Sodium 03/10/2025 140     Potassium 03/10/2025 4.2      "Chloride 03/10/2025 109 (H)     Bicarbonate 03/10/2025 22     Anion Gap 03/10/2025 13     Urea Nitrogen 03/10/2025 19     Creatinine 03/10/2025 1.46 (H)     eGFR 03/10/2025 53 (L)     Calcium 03/10/2025 10.0     POCT Prothrombin time 03/10/2025 12.2     POCT INR 03/10/2025 1.0     Case Report 03/10/2025                      Value:Surgical Pathology                                Case: [id number]                                  Authorizing Provider:  Geoffrey Urrutia MD            Collected:           03/10/2025 0819              Ordering Location:     [facility] OR Received:            03/10/2025 1113              Pathologist:           Edith Singh MD                                                                           Specimen:    KNEE ARTHROPLASTY RIGHT, RIGHT KNEE BONE AND TISSUE                                        FINAL DIAGNOSIS 03/10/2025                      Value:  Specimen labeled \"Right knee bone and tissue\":  -- Osteoarthritis.          03/10/2025                      Value:By the signature on this report, the individual or group listed as making the Final Interpretation/Diagnosis certifies that they have reviewed this case.       Clinical History 03/10/2025                      Value:Pre-op diagnosis:  Arthritis of right knee [M17.11]      Gross Description 03/10/2025                      Value:Received in formalin, labeled with the patient's name and hospital number and \"right knee bone and tissue\", are multiple segments of bone and cartilage aggregating to 12.0 x 11.5 x 2.0 cm.  One portion is recognizable as the tibial plateau, and shows eburnation and roughening of the articular surface.  Separate fragments consistent with femoral condyles also show evidence of cartilage eburnation and roughening.  Also received in the same container are multiple irregular fragments of tan-brown tissue, " resembling synovium and joint capsule aggregating to 3.4 x 3.0 x 0.6 cm.  Representative sections are submitted in 2 cassettes following decalcification.   MRS    Summary of Cassettes:  Specimen Label Site  A  1 soft tissue    2 Bone        WBC 03/11/2025 12.6 (H)     nRBC 03/11/2025 0.0     RBC 03/11/2025 4.22 (L)     Hemoglobin 03/11/2025 14.0     Hematocrit 03/11/2025 42.9     MCV 03/11/2025 102 (H)     MCH 03/11/2025 33.2     MCHC 03/11/2025 32.6     RDW 03/11/2025 12.6     Platelets 03/11/2025 155     Glucose 03/11/2025 134 (H)     Sodium 03/11/2025 137     Potassium 03/11/2025 4.3     Chloride 03/11/2025 106     Bicarbonate 03/11/2025 23     Anion Gap 03/11/2025 12     Urea Nitrogen 03/11/2025 26 (H)     Creatinine 03/11/2025 1.77 (H)     eGFR 03/11/2025 42 (L)     Calcium 03/11/2025 9.1     Glucose 03/11/2025 132 (H)     Sodium 03/11/2025 136     Potassium 03/11/2025 4.2     Chloride 03/11/2025 107     Bicarbonate 03/11/2025 21     Anion Gap 03/11/2025 12     Urea Nitrogen 03/11/2025 25 (H)     Creatinine 03/11/2025 1.62 (H)     eGFR 03/11/2025 47 (L)     Calcium 03/11/2025 8.9     Glucose 03/12/2025 131 (H)     Sodium 03/12/2025 135 (L)     Potassium 03/12/2025 4.7     Chloride 03/12/2025 108 (H)     Bicarbonate 03/12/2025 16 (L)     Anion Gap 03/12/2025 16     Urea Nitrogen 03/12/2025 19     Creatinine 03/12/2025 1.32 (H)     eGFR 03/12/2025 60 (L)     Calcium 03/12/2025 9.1     Extra Tube 03/12/2025 Hold for add-ons.     Ventricular Rate 03/12/2025 86     Atrial Rate 03/12/2025 86     WA Interval 03/12/2025 174     QRS Duration 03/12/2025 86     QT Interval 03/12/2025 354     QTC Calculation(Bazett) 03/12/2025 423     P Axis 03/12/2025 49     R Axis 03/12/2025 -2     T Callahan 03/12/2025 74     QRS Count 03/12/2025 13     Q Onset 03/12/2025 225     P Onset 03/12/2025 138     P Offset 03/12/2025 191     T Offset 03/12/2025 402     QTC Fredericia 03/12/2025 399     POCT Glucose 03/12/2025 177 (H)     Magnesium  03/12/2025 1.77     Magnesium 03/11/2025 1.62     Glucose 03/13/2025 123 (H)     Sodium 03/13/2025 137     Potassium 03/13/2025 4.3     Chloride 03/13/2025 108 (H)     Bicarbonate 03/13/2025 19 (L)     Anion Gap 03/13/2025 14     Urea Nitrogen 03/13/2025 18     Creatinine 03/13/2025 1.14     eGFR 03/13/2025 71     Calcium 03/13/2025 9.1     Methylmalonic Acid, S 03/13/2025 0.30     Folate, Serum 03/13/2025 10.1     Syphilis Total Ab 03/13/2025 Nonreactive     Vitamin B12 03/13/2025 264     Lithium 03/13/2025 0.88    Pre-Admission Testing on 03/03/2025   Component Date Value    Protime 03/03/2025 11.7     INR 03/03/2025 1.1     aPTT 03/03/2025 32     Glucose 03/03/2025 106 (H)     Sodium 03/03/2025 140     Potassium 03/03/2025 4.2     Chloride 03/03/2025 109 (H)     Bicarbonate 03/03/2025 22     Anion Gap 03/03/2025 13     Urea Nitrogen 03/03/2025 12     Creatinine 03/03/2025 1.42 (H)     eGFR 03/03/2025 55 (L)     Calcium 03/03/2025 9.2    Pre-Admission Testing on 02/26/2025   Component Date Value    Staph/MRSA Screen Culture 02/26/2025 No Staphylococcus aureus isolated     Glucose 02/26/2025 94     Sodium 02/26/2025 137     Potassium 02/26/2025 6.4 (HH)     Chloride 02/26/2025 109 (H)     Bicarbonate 02/26/2025 20 (L)     Anion Gap 02/26/2025 14     Urea Nitrogen 02/26/2025 12     Creatinine 02/26/2025 1.34 (H)     eGFR 02/26/2025 59 (L)     Calcium 02/26/2025 9.8      Signed Time Phone Pager   Litzy Burton DO 6/17/2025 13:35 [phone]     Exam Information    Status Exam Begun Exam Ended   Final 6/17/2025 11:00 6/17/2025 11:13     Study Result    Narrative & Impression   Interpreted By:  Ltizy Burton,   STUDY:  CT HEAD WO IV CONTRAST;  6/17/2025 11:13 am      INDICATION:  Signs/Symptoms:MoCA  of 19/30 on 5/29/25 with increasing memory loss  over a year..      COMPARISON:  None.      ACCESSION NUMBER(S):  [id number]      ORDERING CLINICIAN:  VARSHA RODRIGUEZ      TECHNIQUE:  Noncontrast axial CT  scan of head was performed. Angled reformats in  brain and bone windows were generated. The images were reviewed in  bone, brain, blood and soft tissue windows. Coronal and sagittal  reformats are provided for review.      FINDINGS:  CSF Spaces: There is prominence of ventricles and sulci compatible  with parenchymal volume loss. There is no extraaxial fluid collection.      Parenchyma:  There is very subtle diminished attenuation in the  periventricular white matter. Otherwise, the grey-white  differentiation is intact. There is no mass effect or midline shift.  There is no intracranial hemorrhage.      Calvarium: The calvarium is unremarkable.      Paranasal sinuses and mastoids: Visualized paranasal sinuses and  mastoids are clear.      IMPRESSION:  No evidence of acute cortical infarct, mass effect, or intracranial  hemorrhage.      Parenchymal volume loss.          MACRO:  None      Signed by: Litzy Burton 6/17/2025 1:35 PM     Sinus rhythm with frequent Premature ventricular complexes  Otherwise normal ECG     Confirmed by Evert Arceo ([id number]) on 3/16/2025 4:39:48 PM  Measurements    P Axis 49 degrees         P Offset 191 ms         MD Interval 174 ms         Q Onset 225 ms         QTC Calculation(Bazett) 423 ms         QTC Fredericia 399 ms         R Axis -2 degrees          T Axis 74 degrees         Ventricular Rate 86 BPM         Atrial Rate 86 BPM         P Onset 138 ms          QRS Count 13 beats         QRS Duration 86 ms         QT Interval 354 ms         T Offset 402 ms              Assessment/Plan   Psychiatric Risk Assessment  Violence Risk Assessment: none  Acute Risk of Harm to Others is Considered: low   Suicide Risk Assessment: age > 65 yrs old and   Protective Factors against Suicide: adherence to  treatment, fear of suicide, moral objections to suicide, positive family relationships, and sense of responsibility toward family  Acute Risk of Harm to Self is Considered:  low    Imminent Risk of Suicide or Serious Self-Injury: Low   Chronic Risk of Suicide of Serious Self-Injury: Low  Risk factors: Age, depression history and   Protective factors: Denies current suicidal ideation, denies history of suicide attempts , willingness to seek help and support , gender, access to a variety of clinical interventions , and receiving and engaged in care for mental, physical, and substance use disorders      Imminent Risk of Violence or Homicide: Low   Risk Factors: No significant risk factors identified on screening  Protective Factors: Lack of known history of harm to others , Lack of known history of violent ideation , and lack of known access to firearms.     Assessment & Plan  Mild cognitive impairment (MCI) due to Alzheimer's disease  Diagnosis: psychotic mood disorder with delusions and hallucinations, MCI possible dementia.     Treatment Plan:   The FDA risks, benefits & alternatives to the medications prescribed were explained to you and your support person, his sister Peace, today. You & your support person were able to understand & repeat these risks, benefits & alternatives to these prescribed medications. You and your support person have agreed to proceed with treatment with the medications discussed based on the conclusion that the benefit outweighs the risks of this treatment regimen: continue fluoxetine 40 mg for now, continue lithium 300 mg twice daily, change olanzapine to 10 mg nightly and reduced to 5 mg in the morning.  We reviewed your brain CT of June 2025 which showed diffuse cortical loss, EKG had PVC's with a history of ablation, CBC with differential, renal panel, TSH, B12  and folate. Lithium level was therapeutic at 0.7 in June of 2025.   We are ordering an FDG PET Brain scan.  Follow up after the FDA PET scan.  Orders:    NM PET CT brain; Future    Bipolar disorder, current episode depressed, severe, with psychotic features (Multi)  Diagnosis: psychotic mood  disorder with delusions and hallucinations, MCI possible dementia.     Treatment Plan:   The FDA risks, benefits & alternatives to the medications prescribed were explained to you and your support person, his sister Peace, today. You & your support person were able to understand & repeat these risks, benefits & alternatives to these prescribed medications. You and your support person have agreed to proceed with treatment with the medications discussed based on the conclusion that the benefit outweighs the risks of this treatment regimen: continue fluoxetine 40 mg for now, continue lithium 300 mg twice daily, change olanzapine to 10 mg nightly and reduced to 5 mg in the morning.  We reviewed your brain CT of June 2025 which showed diffuse cortical loss, EKG had PVC's with a history of ablation, CBC with differential, renal panel, TSH, B12  and folate. Lithium level was therapeutic at 0.7 in June of 2025.   We are ordering an FDG PET Brain scan.  Follow up after the FDA PET scan.  Orders:    OLANZapine (ZyPREXA) 5 mg tablet; Take 1 tablet (5 mg) by mouth once daily in the morning.    Psychotic mood disorder  Diagnosis: psychotic mood disorder with delusions and hallucinations, MCI possible dementia.     Treatment Plan:   The FDA risks, benefits & alternatives to the medications prescribed were explained to you and your support person, his sister Peace, today. You & your support person were able to understand & repeat these risks, benefits & alternatives to these prescribed medications. You and your support person have agreed to proceed with treatment with the medications discussed based on the conclusion that the benefit outweighs the risks of this treatment regimen: continue fluoxetine 40 mg for now, continue lithium 300 mg twice daily, change olanzapine to 10 mg nightly and reduced to 5 mg in the morning.  We reviewed your brain CT of June 2025 which showed diffuse cortical loss, EKG had PVC's with a history of  ablation, CBC with differential, renal panel, TSH, B12  and folate. Lithium level was therapeutic at 0.7 in June of 2025.   We are ordering an FDG PET Brain scan.  Follow up after the FDA PET scan.  Orders:    OLANZapine (ZyPREXA) 5 mg tablet; Take 1 tablet (5 mg) by mouth once daily in the morning.    Time:   Prep time on date of the patient encounter: 5 minutes.   Time spent directly with patient/family/caregiver: 30 minutes.   Additional time spent on patient care activities:  minutes.   Documentation time: 5 minutes.   Total time on date of patient encounter: 40 minutes.

## 2025-07-10 NOTE — PROGRESS NOTES
[facility] Nurse Note    Name: Trip Brown  MRN: [id number]  YOB: 1960    Date: 07/10/25    Reason for Visit:  No chief complaint on file.    Vitals:       Problem List[1]    Allergies[2]    Encounter Medications[3]    Progress:             [1]   Patient Active Problem List  Diagnosis    Acquired scoliosis    Acquired spondylolisthesis    Anxiety    Asthma    Asthma with COPD with exacerbation    Bipolar 2 disorder, major depressive episode (Multi)    Bipolar affective disorder, currently depressed, moderate (Multi)    Bipolar I disorder (Multi)    Cellulitis    Cervical disc disease    Angina pectoris    Chronic lumbar radiculopathy    Lumbar radiculopathy    Chronic maxillary sinusitis    Chronic obstructive pulmonary disease (Multi)    Cocaine abuse in remission    Cocaine use    Cocaine use disorder, severe, dependence (Multi)    COPD with chronic bronchitis (Multi)    Dental caries    Dependence on nicotine from cigarettes    Epidural abscess ([facility])    Epistaxis    Foot pain, bilateral    Hepatitis B carrier (Multi)    Chronic hepatitis C without hepatic coma (Multi)    History of substance dependence (Multi)    IV drug abuse    Localized, primary osteoarthritis    Severe back pain    Mild depressive disorder    Mixed simple and mucopurulent chronic bronchitis (Multi)    Neck pain, acute    Opioid abuse    Opioid use disorder, severe, dependence (Multi)    Opioid withdrawal (Multi)    Other abnormalities of gait and mobility    Abscess    Inflamed seborrheic keratosis    Hemangioma of skin and subcutaneous tissue    Paraspinal abscess (Multi)    Periapical abscess without sinus    Polysubstance abuse    Post-traumatic osteoarthritis of left knee    Primary osteoarthritis of both knees    Rash and nonspecific skin eruption    Renal insufficiency    Restless legs syndrome    Rupture of left proximal biceps tendon    Sciatic pain    Severe episode of recurrent major depressive disorder, without  psychotic features (Multi)    Severe protein-calorie malnutrition (Multi)    Sinusitis    Smoker    Substance induced mood disorder (Multi)    SVT (supraventricular tachycardia)    Tendinitis of left shoulder    Thrombocytopenia    Palpitations    Bradycardia    PARRA (dyspnea on exertion)    NSVT (nonsustained ventricular tachycardia) (Multi)    Spondylolisthesis    S/P lumbar spinal fusion    Arthritis of right knee    S/P TKR (total knee replacement) not using cement, right    Acute confusion    Chronic knee pain after total replacement of right knee joint    Psychotic mood disorder   [2]   Allergies  Allergen Reactions    Bupropion Hallucinations   [3]   Outpatient Encounter Medications as of 7/10/2025   Medication Sig Dispense Refill    acetaminophen (Tylenol) 325 mg tablet Take 3 tablets (975 mg) by mouth every 6 hours if needed for mild pain (1 - 3).      cholecalciferol (Vitamin D-3) 25 MCG (1000 UT) tablet Take 1 tablet (25 mcg) by mouth once daily.      dilTIAZem CD (Cardizem CD) 240 mg 24 hr capsule Take 1 capsule (240 mg) by mouth once daily.      docusate sodium (Colace) 100 mg capsule Take 1 capsule (100 mg) by mouth 2 times a day.      FLUoxetine (PROzac) 20 mg capsule Take 1 capsule (20 mg) by mouth once daily. 30 capsule 1    fluticasone (Flonase) 50 mcg/actuation nasal spray Administer 1 spray into affected nostril(s) once daily.      gabapentin (Neurontin) 300 mg capsule TAKE 1 CAPSULE (300 MG) BY MOUTH 3 TIMES A DAY. 90 capsule 2    ipratropium-albuteroL (Duo-Neb) 0.5-2.5 mg/3 mL nebulizer solution Inhale 3 mL every 6 hours if needed.      lithium 300 mg capsule Take 1 capsule (300 mg) by mouth 2 times daily (morning and late afternoon). 60 capsule 1    nicotine (Nicoderm CQ) 21 mg/24 hr patch Place 1 patch over 24 hours on the skin once every 24 hours. 30 patch 0    nicotine polacrilex (Commit) 4 mg lozenge Dissolve 1 lozenge (4 mg) in the mouth every 2 hours if needed for smoking cessation. 100  lozenge 0    OLANZapine (ZyPREXA) 10 mg tablet Take 1 tablet (10 mg) by mouth once daily at bedtime. 30 tablet 1    OLANZapine (ZyPREXA) 7.5 mg tablet Take 1 tablet (7.5 mg) by mouth once daily in the morning. 30 tablet 1    pantoprazole (ProtoNix) 40 mg EC tablet Take 1 tablet (40 mg) by mouth once daily in the morning. Take before meals for 20 doses. For 21 days.  Do not crush, chew, or split.      polyethylene glycol (Glycolax, Miralax) 17 gram packet Take 17 g by mouth 2 times a day as needed (for constipation).      tiotropium (Spiriva) 18 mcg inhalation capsule Place 1 capsule (18 mcg) into inhaler and inhale once daily.      tiotropium-olodateroL (Stiolto Respimat) 2.5-2.5 mcg/actuation mist inhaler Inhale 2 Inhalations once daily. 1 g 3    Ventolin HFA 90 mcg/actuation inhaler Inhale 2 puffs every 6 hours if needed for wheezing or shortness of breath.       No facility-administered encounter medications on file as of 7/10/2025.

## 2025-07-11 ENCOUNTER — TELEPHONE (OUTPATIENT)
Dept: PULMONOLOGY | Facility: HOSPITAL | Age: 65
End: 2025-07-11
Payer: MEDICARE

## 2025-07-11 NOTE — TELEPHONE ENCOUNTER
Pt would like to see a different provider to be seen sooner but hung up before we could reschedule. PF

## 2025-07-30 ENCOUNTER — APPOINTMENT (OUTPATIENT)
Dept: GASTROENTEROLOGY | Facility: CLINIC | Age: 65
End: 2025-07-30
Payer: MEDICARE

## 2025-07-31 ENCOUNTER — TELEPHONE (OUTPATIENT)
Dept: SCHEDULING | Age: 65
End: 2025-07-31

## 2025-08-04 ENCOUNTER — HOSPITAL ENCOUNTER (OUTPATIENT)
Dept: RADIOLOGY | Facility: CLINIC | Age: 65
Discharge: HOME | End: 2025-08-04
Payer: MEDICARE

## 2025-08-04 DIAGNOSIS — G31.84 MILD COGNITIVE IMPAIRMENT (MCI) DUE TO ALZHEIMER'S DISEASE: ICD-10-CM

## 2025-08-04 DIAGNOSIS — G30.9 MILD COGNITIVE IMPAIRMENT (MCI) DUE TO ALZHEIMER'S DISEASE: ICD-10-CM

## 2025-08-04 PROCEDURE — A9552 F18 FDG: HCPCS | Performed by: PSYCHIATRY & NEUROLOGY

## 2025-08-04 PROCEDURE — 78814 PET IMAGE W/CT LMTD: CPT | Performed by: STUDENT IN AN ORGANIZED HEALTH CARE EDUCATION/TRAINING PROGRAM

## 2025-08-04 PROCEDURE — 78608 BRAIN IMAGING (PET): CPT

## 2025-08-04 PROCEDURE — 3430000001 HC RX 343 DIAGNOSTIC RADIOPHARMACEUTICALS: Performed by: PSYCHIATRY & NEUROLOGY

## 2025-08-04 RX ORDER — FLUDEOXYGLUCOSE F 18 200 MCI/ML
10.2 INJECTION, SOLUTION INTRAVENOUS
Status: COMPLETED | OUTPATIENT
Start: 2025-08-04 | End: 2025-08-04

## 2025-08-04 RX ADMIN — FLUDEOXYGLUCOSE F 18 10.2 MILLICURIE: 200 INJECTION, SOLUTION INTRAVENOUS at 11:40

## 2025-08-11 DIAGNOSIS — F31.5 BIPOLAR DISORDER, CURRENT EPISODE DEPRESSED, SEVERE, WITH PSYCHOTIC FEATURES (MULTI): ICD-10-CM

## 2025-08-11 DIAGNOSIS — F39: ICD-10-CM

## 2025-08-11 RX ORDER — OLANZAPINE 10 MG/1
10 TABLET, FILM COATED ORAL NIGHTLY
Qty: 30 TABLET | Refills: 1 | Status: SHIPPED | OUTPATIENT
Start: 2025-08-11 | End: 2025-10-10

## 2025-08-12 ENCOUNTER — APPOINTMENT (OUTPATIENT)
Dept: NEUROLOGY | Facility: CLINIC | Age: 65
End: 2025-08-12
Payer: MEDICARE

## 2025-08-12 ENCOUNTER — APPOINTMENT (OUTPATIENT)
Dept: PAIN MEDICINE | Facility: CLINIC | Age: 65
End: 2025-08-12
Payer: MEDICARE

## 2025-08-12 VITALS
SYSTOLIC BLOOD PRESSURE: 124 MMHG | HEART RATE: 64 BPM | RESPIRATION RATE: 18 BRPM | DIASTOLIC BLOOD PRESSURE: 72 MMHG | WEIGHT: 197 LBS | HEIGHT: 72 IN | BODY MASS INDEX: 26.68 KG/M2 | TEMPERATURE: 98.4 F

## 2025-08-12 DIAGNOSIS — F03.A0 MILD DEMENTIA WITHOUT BEHAVIORAL DISTURBANCE, PSYCHOTIC DISTURBANCE, MOOD DISTURBANCE, OR ANXIETY, UNSPECIFIED DEMENTIA TYPE: ICD-10-CM

## 2025-08-12 DIAGNOSIS — F32.A MILD DEPRESSIVE DISORDER: Primary | Chronic | ICD-10-CM

## 2025-08-12 DIAGNOSIS — R41.3 MEMORY LOSS: ICD-10-CM

## 2025-08-12 DIAGNOSIS — F11.20 OPIOID USE DISORDER, SEVERE, DEPENDENCE (MULTI): Chronic | ICD-10-CM

## 2025-08-12 PROCEDURE — 1159F MED LIST DOCD IN RCRD: CPT | Performed by: PSYCHIATRY & NEUROLOGY

## 2025-08-12 PROCEDURE — 1125F AMNT PAIN NOTED PAIN PRSNT: CPT | Performed by: PSYCHIATRY & NEUROLOGY

## 2025-08-12 PROCEDURE — 1160F RVW MEDS BY RX/DR IN RCRD: CPT | Performed by: PSYCHIATRY & NEUROLOGY

## 2025-08-12 PROCEDURE — 99215 OFFICE O/P EST HI 40 MIN: CPT | Performed by: PSYCHIATRY & NEUROLOGY

## 2025-08-12 PROCEDURE — 3008F BODY MASS INDEX DOCD: CPT | Performed by: PSYCHIATRY & NEUROLOGY

## 2025-08-12 RX ORDER — FOLIC ACID 1 MG/1
1 TABLET ORAL DAILY
Qty: 30 TABLET | Refills: 11 | Status: SHIPPED | OUTPATIENT
Start: 2025-08-12 | End: 2026-08-12

## 2025-08-12 ASSESSMENT — PAIN SCALES - GENERAL: PAINLEVEL_OUTOF10: 5

## 2025-08-21 ENCOUNTER — OFFICE VISIT (OUTPATIENT)
Dept: PAIN MEDICINE | Facility: CLINIC | Age: 65
End: 2025-08-21
Payer: MEDICARE

## 2025-08-21 VITALS — HEIGHT: 72 IN | BODY MASS INDEX: 26.68 KG/M2 | RESPIRATION RATE: 18 BRPM | WEIGHT: 197 LBS

## 2025-08-21 DIAGNOSIS — F11.91 OPIOID USE DISORDER IN REMISSION: ICD-10-CM

## 2025-08-21 DIAGNOSIS — G89.18 POSTOPERATIVE PAIN: Primary | ICD-10-CM

## 2025-08-21 PROCEDURE — 3008F BODY MASS INDEX DOCD: CPT | Performed by: ANESTHESIOLOGY

## 2025-08-21 PROCEDURE — 99204 OFFICE O/P NEW MOD 45 MIN: CPT | Performed by: ANESTHESIOLOGY

## 2025-08-21 PROCEDURE — 1125F AMNT PAIN NOTED PAIN PRSNT: CPT | Performed by: ANESTHESIOLOGY

## 2025-08-21 PROCEDURE — 99213 OFFICE O/P EST LOW 20 MIN: CPT

## 2025-08-21 SDOH — ECONOMIC STABILITY: FOOD INSECURITY: WITHIN THE PAST 12 MONTHS, YOU WORRIED THAT YOUR FOOD WOULD RUN OUT BEFORE YOU GOT MONEY TO BUY MORE.: NEVER TRUE

## 2025-08-21 ASSESSMENT — ENCOUNTER SYMPTOMS
NAUSEA: 0
MYALGIAS: 1
CONFUSION: 0
FEVER: 0
COLOR CHANGE: 0
CONSTIPATION: 0
VOMITING: 0
SLEEP DISTURBANCE: 0
DIARRHEA: 0
ARTHRALGIAS: 1
BACK PAIN: 1
SHORTNESS OF BREATH: 0
HEADACHES: 0
TROUBLE SWALLOWING: 0
DEPRESSION: 0
DIFFICULTY URINATING: 0
CHILLS: 0
ABDOMINAL DISTENTION: 0
SPEECH DIFFICULTY: 0
JOINT SWELLING: 0
WHEEZING: 0
OCCASIONAL FEELINGS OF UNSTEADINESS: 0
BLOOD IN STOOL: 0
LOSS OF SENSATION IN FEET: 1
ABDOMINAL PAIN: 0
UNEXPECTED WEIGHT CHANGE: 0
COUGH: 0
LIGHT-HEADEDNESS: 0
DIZZINESS: 0

## 2025-08-21 ASSESSMENT — PATIENT HEALTH QUESTIONNAIRE - PHQ9
2. FEELING DOWN, DEPRESSED OR HOPELESS: NOT AT ALL
1. LITTLE INTEREST OR PLEASURE IN DOING THINGS: NOT AT ALL
SUM OF ALL RESPONSES TO PHQ9 QUESTIONS 1 AND 2: 0

## 2025-08-21 ASSESSMENT — PAIN SCALES - GENERAL
PAINLEVEL_OUTOF10: 6
PAINLEVEL_OUTOF10: 6

## 2025-08-21 ASSESSMENT — PAIN - FUNCTIONAL ASSESSMENT: PAIN_FUNCTIONAL_ASSESSMENT: 0-10

## 2025-08-22 ENCOUNTER — TELEPHONE (OUTPATIENT)
Dept: PAIN MEDICINE | Facility: CLINIC | Age: 65
End: 2025-08-22
Payer: MEDICARE

## 2025-08-22 DIAGNOSIS — G89.18 POSTOPERATIVE PAIN: Primary | ICD-10-CM

## 2025-09-02 ENCOUNTER — APPOINTMENT (OUTPATIENT)
Dept: RADIOLOGY | Facility: HOSPITAL | Age: 65
End: 2025-09-02
Payer: MEDICARE

## 2025-09-02 ENCOUNTER — HOSPITAL ENCOUNTER (OUTPATIENT)
Dept: RADIOLOGY | Facility: HOSPITAL | Age: 65
Discharge: HOME | End: 2025-09-02
Payer: MEDICARE

## 2025-09-02 DIAGNOSIS — R41.3 MEMORY LOSS: ICD-10-CM

## 2025-09-02 PROCEDURE — 70551 MRI BRAIN STEM W/O DYE: CPT | Performed by: RADIOLOGY

## 2025-09-02 PROCEDURE — 70551 MRI BRAIN STEM W/O DYE: CPT

## 2025-09-04 ENCOUNTER — RESULTS FOLLOW-UP (OUTPATIENT)
Dept: NEUROLOGY | Facility: HOSPITAL | Age: 65
End: 2025-09-04
Payer: MEDICARE

## 2025-09-17 ENCOUNTER — APPOINTMENT (OUTPATIENT)
Dept: BEHAVIORAL HEALTH | Facility: CLINIC | Age: 65
End: 2025-09-17
Payer: MEDICARE

## 2025-09-24 ENCOUNTER — APPOINTMENT (OUTPATIENT)
Dept: ORTHOPEDIC SURGERY | Facility: CLINIC | Age: 65
End: 2025-09-24
Payer: MEDICARE

## 2025-10-28 ENCOUNTER — APPOINTMENT (OUTPATIENT)
Dept: SLEEP MEDICINE | Facility: CLINIC | Age: 65
End: 2025-10-28
Payer: MEDICARE

## (undated) DEVICE — DIFFUSER, MAESTRO, CORE

## (undated) DEVICE — TRAY, DRY PREP, PREMIUM

## (undated) DEVICE — GOWN, ASTOUND, XL

## (undated) DEVICE — DRAPE, INCISE, ANTIMICROBIAL, IOBAN 2, STERI DRAPE, 23 X 33 IN, DISPOSABLE, STERILE

## (undated) DEVICE — SUTURE, SILK, 2-0, 18 IN, BLACK

## (undated) DEVICE — DRAPE, U-DRAPE, NON STERILE

## (undated) DEVICE — DRAPE, INSTRUMENT, W/POUCH, STERI DRAPE, 7 X 11 IN, DISPOSABLE, STERILE

## (undated) DEVICE — BLADE, SAGITTAL DUAL CUT, 25 X 90 X 1.27

## (undated) DEVICE — TIP, SUCTION, SUPER SUCKER, KAM, MINI, CURVED

## (undated) DEVICE — EXTENDER, SV TAB, 5.5/6.0

## (undated) DEVICE — DRAPE, TIBURON, HIP W/POUCHES

## (undated) DEVICE — Device

## (undated) DEVICE — MARKER, SKIN, REGULAR TIP, W/FLEXI-RULER

## (undated) DEVICE — GLOVE, SURGICAL, PROTEXIS PI ORTHO, 8.0, PF, LF

## (undated) DEVICE — APPLICATOR, CHLORAPREP, W/ORANGE TINT, 26ML

## (undated) DEVICE — SUTURE, MONOCRYL, 4-0, 18 IN, PS2, UNDYED

## (undated) DEVICE — DRAPE, SHEET, THREE QUARTER, FAN FOLD, 57 X 77 IN

## (undated) DEVICE — PAD, GROUNDING, ELECTROSURGICAL, W/9 FT CABLE, POLYHESIVE II, ADULT, LF

## (undated) DEVICE — KIT, PATIENT CARE, JACKSON TABLE W/PRONE-SAFE HEADREST

## (undated) DEVICE — BANDAGE, COFLEX, 6 X 5 YDS, FOAM TAN, STERILE, LF

## (undated) DEVICE — CEMENT, MIXEVAC III, 10S BOWL, KNEES

## (undated) DEVICE — SUTURE, VICRYL, 1, 36 IN, CTX, VIOLET

## (undated) DEVICE — STAPLER, SKIN PROXIMATE, 35 WIDE

## (undated) DEVICE — SUTURE, SILK, 3-0, 18 IN, MULTIPACK, BLACK

## (undated) DEVICE — SEALANT, HEMOSTATIC, FLOSEAL, 10 ML

## (undated) DEVICE — DRAPE COVER, C ARM, FLOUROSCAN IMAGING SYS

## (undated) DEVICE — SPONGE, LAP, XRAY DECT, 18IN X 18IN, W/LOOP, STERILE

## (undated) DEVICE — SUTURE, PDS II, 0, 27 IN, CT1, VIOLET

## (undated) DEVICE — WOUND SYSTEM, DEBRIDEMENT & CLEANING, O.R DUOPAK

## (undated) DEVICE — SUTURE, ETHILON, 2-0, FSLX 30, BLACK

## (undated) DEVICE — GUIDEWIRE, SEXTANT, BLUNT

## (undated) DEVICE — GLOVE, SURGICAL, PROTEXIS PI MICRO, 7.5, PF, LF

## (undated) DEVICE — DRESSING, MEPILEX BORDER, POST-OP AG, 4 X 12 IN

## (undated) DEVICE — SUTURE, MONOCRYL, 3-0, 18 IN, PS2, UNDYED

## (undated) DEVICE — TOWEL, SURGICAL, NEURO, O/R, 16 X 26, BLUE, STERILE

## (undated) DEVICE — TUBING, IRRIGATION, HIGH FLOW, HAND PIECE SET

## (undated) DEVICE — SUTURE, VICRYL, 3-0, 27 IN, CT-1, VIOLET

## (undated) DEVICE — SUTURE, PDS II, 2-0, 27 IN, SH, VIOLET

## (undated) DEVICE — DRAPE, INSTRUMENT, W/POUCH, STERI DRAPE, 9 5/8 X 18 LONG

## (undated) DEVICE — SUTURE, VICRYL, 2-0, 27 IN, SH, UNDYED

## (undated) DEVICE — ELECTRODE, ELECTROSURGICAL, BLADE, 2.75 IN, TEFLON

## (undated) DEVICE — GLOVE, SURGEON, PREMIERPRO PI, MICRO, SZ-8.0, PF, WH

## (undated) DEVICE — COVER HANDLE LIGHT, STERIS, BLUE, STERILE

## (undated) DEVICE — SUTURE, VICRYL, 3-0, 27IN, RB-1

## (undated) DEVICE — SUTURE, PDSII, 1, TP-1, VIL, MONO, 48LP

## (undated) DEVICE — GLOVE, SURGICAL, PROTEXIS PI MICRO, 7.0, PF, LF

## (undated) DEVICE — ELECTRODE, ELECTROSURGICAL, BLADE, INSULATED, ENT/IMA, STERILE

## (undated) DEVICE — SPONGE, LAP, XRAY DECT, 4IN X 18IN, W/MASTER DMT, STERILE

## (undated) DEVICE — STRIP, SKIN CLOSURE, STERI STRIP, REINFORCED, 0.5 X 4 IN

## (undated) DEVICE — DRAPE, SHEET, VI, W/BETADINE

## (undated) DEVICE — BANDAGE, ELASTIC, ACE, ACE, DOUBLE LENGTH, 6 X 550 IN, LF

## (undated) DEVICE — DRAPE, TIBURON, SPLIT SHEET, REINF ADH STRIP, 77X108

## (undated) DEVICE — GLOVE, SURGEON, PREMIERPRO PI, ORTHO, SZ-8.5, PF, LT GRN

## (undated) DEVICE — CUFF, TOURNIQUET, 30 X 4, DUAL PORT/SNGL BLADDER, DISP, LF

## (undated) DEVICE — DRESSING, ISLAND, ADHESIVE, TELFA, 4 X 8 IN

## (undated) DEVICE — APPLIER, MULTIPLE CLIP, LIGACLIP, W/20 MEDIUM, 11.5 APPLIER

## (undated) DEVICE — KIT, MINOR, DOUBLE BASIN

## (undated) DEVICE — TIP, SUCTION, YANKAUER, W/O VENT, FLEXIBLE, OPEN TIP, HIGH CAPACITY

## (undated) DEVICE — ADHESIVE, SKIN, LIQUIBAND EXCEED

## (undated) DEVICE — SLEEVE, VASO PRESS, CALF GARMENT, MEDIUM, GREEN

## (undated) DEVICE — BLADE, SAW, SAGITTAL, 12.5 X 81.5 X 1.27 MM, STAINLESS STEEL, STERILE

## (undated) DEVICE — SYRINGE, 50 CC, LUER LOCK

## (undated) DEVICE — COVER, C-ARM W/CLIPS, OEC GE

## (undated) DEVICE — TIP,  ELECTRODE COATED INSULATED, EXTENDED, LF

## (undated) DEVICE — NEEDLE, HYPODERMIC, SAFETY, GLIDE, 18G X 1.5"

## (undated) DEVICE — BLADE, SAW, RECIPROCATING, DOUBLE SIDED, THIN, STAINLESS STEEL